# Patient Record
Sex: FEMALE | Race: WHITE | NOT HISPANIC OR LATINO | Employment: OTHER | URBAN - METROPOLITAN AREA
[De-identification: names, ages, dates, MRNs, and addresses within clinical notes are randomized per-mention and may not be internally consistent; named-entity substitution may affect disease eponyms.]

---

## 2023-08-21 ENCOUNTER — HOSPITAL ENCOUNTER (OUTPATIENT)
Dept: DATA CONVERSION | Facility: HOSPITAL | Age: 88
Discharge: HOME | End: 2023-08-21
Payer: MEDICARE

## 2023-08-21 DIAGNOSIS — Z00.00 ENCOUNTER FOR GENERAL ADULT MEDICAL EXAMINATION WITHOUT ABNORMAL FINDINGS: ICD-10-CM

## 2023-08-21 DIAGNOSIS — I10 ESSENTIAL (PRIMARY) HYPERTENSION: ICD-10-CM

## 2023-08-21 DIAGNOSIS — E78.5 HYPERLIPIDEMIA, UNSPECIFIED: ICD-10-CM

## 2023-08-21 LAB
25(OH)D3 SERPL-MCNC: 36 NG/ML (ref 31–100)
ALBUMIN SERPL-MCNC: 4.2 GM/DL (ref 3.5–5)
ALBUMIN/GLOB SERPL: 1.6 RATIO (ref 1.5–3)
ALP BLD-CCNC: 117 U/L (ref 35–125)
ALT SERPL-CCNC: 9 U/L (ref 5–40)
ANION GAP SERPL CALCULATED.3IONS-SCNC: 11 MMOL/L (ref 0–19)
APPEARANCE PLAS: ABNORMAL
AST SERPL-CCNC: 19 U/L (ref 5–40)
BILIRUB SERPL-MCNC: 0.3 MG/DL (ref 0.1–1.2)
BUN SERPL-MCNC: 12 MG/DL (ref 8–25)
BUN/CREAT SERPL: 20 RATIO (ref 8–21)
CALCIUM SERPL-MCNC: 9.2 MG/DL (ref 8.5–10.4)
CHLORIDE SERPL-SCNC: 98 MMOL/L (ref 97–107)
CHOLEST SERPL-MCNC: 137 MG/DL (ref 133–200)
CHOLEST/HDLC SERPL: 2.9 RATIO
CO2 SERPL-SCNC: 25 MMOL/L (ref 24–31)
COLOR SPUN FLD: ABNORMAL
CREAT SERPL-MCNC: 0.6 MG/DL (ref 0.4–1.6)
DEPRECATED RDW RBC AUTO: 47.8 FL (ref 37–54)
ERYTHROCYTE [DISTWIDTH] IN BLOOD BY AUTOMATED COUNT: 13.7 % (ref 11.7–15)
FASTING STATUS PATIENT QL REPORTED: ABNORMAL
GFR SERPL CREATININE-BSD FRML MDRD: 87 ML/MIN/1.73 M2
GLOBULIN SER-MCNC: 2.6 G/DL (ref 1.9–3.7)
GLUCOSE SERPL-MCNC: 102 MG/DL (ref 65–99)
HCT VFR BLD AUTO: 38.5 % (ref 36–44)
HDLC SERPL-MCNC: 47 MG/DL
HGB BLD-MCNC: 12.5 GM/DL (ref 12–15)
LDLC SERPL CALC-MCNC: 68 MG/DL (ref 65–130)
MCH RBC QN AUTO: 30.6 PG (ref 26–34)
MCHC RBC AUTO-ENTMCNC: 32.5 % (ref 31–37)
MCV RBC AUTO: 94.4 FL (ref 80–100)
NRBC BLD-RTO: 0 /100 WBC
PLATELET # BLD AUTO: 266 K/UL (ref 150–450)
PMV BLD AUTO: 10.4 CU (ref 7–12.6)
POTASSIUM SERPL-SCNC: 4.8 MMOL/L (ref 3.4–5.1)
PROT SERPL-MCNC: 6.8 G/DL (ref 5.9–7.9)
RBC # BLD AUTO: 4.08 M/UL (ref 4–4.9)
SODIUM SERPL-SCNC: 134 MMOL/L (ref 133–145)
TRIGL SERPL-MCNC: 111 MG/DL (ref 40–150)
VIT B12 SERPL-MCNC: 934 PG/ML (ref 211–946)
WBC # BLD AUTO: 6.2 K/UL (ref 4.5–11)

## 2023-09-16 VITALS
RESPIRATION RATE: 16 BRPM | SYSTOLIC BLOOD PRESSURE: 136 MMHG | HEIGHT: 66 IN | OXYGEN SATURATION: 98 % | BODY MASS INDEX: 18.96 KG/M2 | TEMPERATURE: 97.2 F | DIASTOLIC BLOOD PRESSURE: 72 MMHG | HEART RATE: 62 BPM | WEIGHT: 118 LBS

## 2023-09-17 PROBLEM — F10.10 ALCOHOL ABUSE: Status: ACTIVE | Noted: 2023-09-17

## 2023-09-17 PROBLEM — K44.9 HIATAL HERNIA: Status: ACTIVE | Noted: 2023-09-17

## 2023-09-17 PROBLEM — E78.5 HYPERLIPIDEMIA: Status: ACTIVE | Noted: 2023-09-17

## 2023-09-17 PROBLEM — I49.9 CARDIAC ARRHYTHMIA: Status: ACTIVE | Noted: 2023-09-17

## 2023-09-17 PROBLEM — M85.80 OSTEOPENIA: Status: ACTIVE | Noted: 2023-09-17

## 2023-09-17 PROBLEM — S39.92XA BACK INJURY: Status: ACTIVE | Noted: 2023-09-17

## 2023-09-17 PROBLEM — S42.253A CLOSED FRACTURE OF GREATER TUBEROSITY OF HUMERUS: Status: ACTIVE | Noted: 2023-09-17

## 2023-09-17 PROBLEM — S72.143A CLOSED INTERTROCHANTERIC FRACTURE (MULTI): Status: ACTIVE | Noted: 2023-09-17

## 2023-09-17 PROBLEM — K21.9 CHRONIC GERD: Status: ACTIVE | Noted: 2023-09-17

## 2023-09-17 PROBLEM — Z96.641 STATUS POST RIGHT HIP REPLACEMENT: Status: ACTIVE | Noted: 2023-09-17

## 2023-09-17 PROBLEM — C44.92 SCC (SQUAMOUS CELL CARCINOMA): Status: ACTIVE | Noted: 2023-09-17

## 2023-09-17 PROBLEM — I20.89 SYNCOPE ANGINOSA (CMS-HCC): Status: ACTIVE | Noted: 2023-09-17

## 2023-09-17 PROBLEM — J45.909 ASTHMATIC BRONCHITIS (HHS-HCC): Status: ACTIVE | Noted: 2023-09-17

## 2023-09-17 PROBLEM — I10 HTN (HYPERTENSION), BENIGN: Status: ACTIVE | Noted: 2023-09-17

## 2023-09-17 PROBLEM — F41.1 GENERALIZED ANXIETY DISORDER: Status: ACTIVE | Noted: 2023-09-17

## 2023-09-17 PROBLEM — R07.9 CHEST PAIN: Status: ACTIVE | Noted: 2023-09-17

## 2023-09-17 PROBLEM — I48.0 PAROXYSMAL ATRIAL FIBRILLATION (MULTI): Status: ACTIVE | Noted: 2023-09-17

## 2023-09-17 PROBLEM — R73.01 IMPAIRED FASTING GLUCOSE: Status: ACTIVE | Noted: 2023-09-17

## 2023-09-17 RX ORDER — ALPRAZOLAM 0.25 MG/1
1 TABLET ORAL DAILY PRN
COMMUNITY
End: 2023-10-04 | Stop reason: SDUPTHER

## 2023-09-17 RX ORDER — CETIRIZINE HYDROCHLORIDE 10 MG/1
1 TABLET ORAL DAILY
COMMUNITY
Start: 2022-06-07 | End: 2023-11-14 | Stop reason: WASHOUT

## 2023-09-17 RX ORDER — FERROUS SULFATE, DRIED 160(50) MG
1 TABLET, EXTENDED RELEASE ORAL DAILY
COMMUNITY

## 2023-09-17 RX ORDER — BISACODYL 5 MG
1 TABLET, DELAYED RELEASE (ENTERIC COATED) ORAL DAILY PRN
COMMUNITY
Start: 2022-08-04 | End: 2023-10-02 | Stop reason: ALTCHOICE

## 2023-09-17 RX ORDER — FOLIC ACID 1 MG/1
1 TABLET ORAL DAILY
COMMUNITY
Start: 2022-08-04 | End: 2023-10-02 | Stop reason: ALTCHOICE

## 2023-09-17 RX ORDER — METOPROLOL SUCCINATE 50 MG/1
1 TABLET, EXTENDED RELEASE ORAL DAILY
COMMUNITY
Start: 2022-11-10 | End: 2023-10-12

## 2023-09-17 RX ORDER — POTASSIUM CHLORIDE 1500 MG/1
1 TABLET, EXTENDED RELEASE ORAL 2 TIMES DAILY
COMMUNITY
End: 2023-10-02 | Stop reason: ALTCHOICE

## 2023-09-17 RX ORDER — TRIAMCINOLONE ACETONIDE 55 UG/1
2 SPRAY, METERED NASAL 2 TIMES DAILY
COMMUNITY
End: 2023-10-02 | Stop reason: ALTCHOICE

## 2023-09-17 RX ORDER — ALPRAZOLAM 0.5 MG/1
0.5 TABLET ORAL 2 TIMES DAILY PRN
COMMUNITY
Start: 2023-02-10 | End: 2023-10-02 | Stop reason: ALTCHOICE

## 2023-09-17 RX ORDER — SODIUM CHLORIDE 1000 MG
1 TABLET, SOLUBLE MISCELLANEOUS 3 TIMES DAILY
COMMUNITY
Start: 2022-08-04 | End: 2023-10-02 | Stop reason: ALTCHOICE

## 2023-09-17 RX ORDER — LIDOCAINE 50 MG/G
1 PATCH TOPICAL DAILY
COMMUNITY
Start: 2023-07-27

## 2023-09-17 RX ORDER — AMLODIPINE BESYLATE 10 MG/1
1 TABLET ORAL DAILY
COMMUNITY

## 2023-09-17 RX ORDER — ATORVASTATIN CALCIUM 10 MG/1
1 TABLET, FILM COATED ORAL DAILY
COMMUNITY

## 2023-09-17 RX ORDER — APIXABAN 2.5 MG/1
TABLET, FILM COATED ORAL 2 TIMES DAILY
COMMUNITY
Start: 2022-11-10 | End: 2023-10-02 | Stop reason: ALTCHOICE

## 2023-09-17 RX ORDER — CLONIDINE HYDROCHLORIDE 0.1 MG/1
1 TABLET ORAL EVERY 6 HOURS PRN
COMMUNITY
Start: 2022-08-04 | End: 2023-10-02 | Stop reason: ALTCHOICE

## 2023-09-17 RX ORDER — MIRTAZAPINE 15 MG/1
1 TABLET, FILM COATED ORAL NIGHTLY
COMMUNITY
End: 2024-06-05

## 2023-09-17 RX ORDER — LANOLIN ALCOHOL/MO/W.PET/CERES
1 CREAM (GRAM) TOPICAL DAILY
COMMUNITY
Start: 2022-08-04 | End: 2023-10-02 | Stop reason: ALTCHOICE

## 2023-09-17 RX ORDER — OMEPRAZOLE 20 MG/1
1 CAPSULE, DELAYED RELEASE ORAL
COMMUNITY

## 2023-09-17 RX ORDER — POTASSIUM CHLORIDE 20 MEQ/1
1 TABLET, EXTENDED RELEASE ORAL DAILY
COMMUNITY
End: 2023-10-02 | Stop reason: ALTCHOICE

## 2023-09-17 RX ORDER — TALC
1 POWDER (GRAM) TOPICAL NIGHTLY
COMMUNITY
Start: 2022-08-04 | End: 2023-10-02 | Stop reason: ALTCHOICE

## 2023-09-17 RX ORDER — ACETAMINOPHEN 325 MG/1
2 TABLET ORAL EVERY 4 HOURS PRN
COMMUNITY
Start: 2022-08-04 | End: 2023-10-02 | Stop reason: ALTCHOICE

## 2023-09-17 RX ORDER — PANTOPRAZOLE SODIUM 40 MG/1
1 TABLET, DELAYED RELEASE ORAL DAILY
COMMUNITY
Start: 2022-08-04 | End: 2023-10-02 | Stop reason: ALTCHOICE

## 2023-09-17 RX ORDER — LANOLIN ALCOHOL/MO/W.PET/CERES
1 CREAM (GRAM) TOPICAL DAILY
COMMUNITY
End: 2023-10-02 | Stop reason: ALTCHOICE

## 2023-10-01 PROBLEM — F41.1 ANXIETY STATE: Status: ACTIVE | Noted: 2023-10-01

## 2023-10-01 PROBLEM — G89.29 OTHER CHRONIC PAIN: Status: ACTIVE | Noted: 2023-10-01

## 2023-10-01 RX ORDER — DEXTROMETHORPHAN HYDROBROMIDE, GUAIFENESIN 5; 100 MG/5ML; MG/5ML
1300 LIQUID ORAL EVERY 8 HOURS PRN
COMMUNITY

## 2023-10-02 ENCOUNTER — NURSING HOME VISIT (OUTPATIENT)
Dept: POST ACUTE CARE | Facility: EXTERNAL LOCATION | Age: 88
End: 2023-10-02
Payer: MEDICARE

## 2023-10-02 VITALS
RESPIRATION RATE: 16 BRPM | BODY MASS INDEX: 18.88 KG/M2 | DIASTOLIC BLOOD PRESSURE: 72 MMHG | WEIGHT: 117 LBS | TEMPERATURE: 97.7 F | SYSTOLIC BLOOD PRESSURE: 122 MMHG | HEART RATE: 75 BPM

## 2023-10-02 DIAGNOSIS — F41.1 GENERALIZED ANXIETY DISORDER: ICD-10-CM

## 2023-10-02 DIAGNOSIS — E78.2 MIXED HYPERLIPIDEMIA: Chronic | ICD-10-CM

## 2023-10-02 DIAGNOSIS — K21.9 CHRONIC GERD: ICD-10-CM

## 2023-10-02 DIAGNOSIS — R60.0 BILATERAL LEG EDEMA: ICD-10-CM

## 2023-10-02 DIAGNOSIS — I48.0 PAROXYSMAL ATRIAL FIBRILLATION (MULTI): ICD-10-CM

## 2023-10-02 DIAGNOSIS — I10 HTN (HYPERTENSION), BENIGN: Primary | ICD-10-CM

## 2023-10-02 DIAGNOSIS — Z00.00 HEALTHCARE MAINTENANCE: ICD-10-CM

## 2023-10-02 PROCEDURE — 99349 HOME/RES VST EST MOD MDM 40: CPT | Performed by: NURSE PRACTITIONER

## 2023-10-02 ASSESSMENT — PAIN SCALES - GENERAL: PAINLEVEL: 0-NO PAIN

## 2023-10-02 ASSESSMENT — ENCOUNTER SYMPTOMS
ABDOMINAL PAIN: 0
JOINT SWELLING: 0
COUGH: 0
CHILLS: 0
WHEEZING: 0
SHORTNESS OF BREATH: 0
APPETITE CHANGE: 0
FEVER: 0
HEADACHES: 0
NUMBNESS: 0
NAUSEA: 0
DIARRHEA: 0
DIZZINESS: 0
NERVOUS/ANXIOUS: 1
VOMITING: 0
PALPITATIONS: 0
CONSTIPATION: 1

## 2023-10-02 NOTE — PROGRESS NOTES
Subjective   Patient ID: Stephanie Jimenez is a 87 y.o. female who is assisted living/ home patient being seen and evaluated for multiple medical problems.    Patient is seen this morning at [facility] sitting in chair in her room. Upon provider arrival, she was initially up ambulating with walker into the bathroom. She is alert, oriented, able to answer all questions regarding her health. Facility staff does supplement HPI as needed. Patient does not drive or have a vehicle. She requires supervision by facility staff but is able to perform ADL's with minimal assistance. She denies appetite changes, signs of weight loss. Denies abdominal pain, N/V. Does admit to intermittent constipation but denies any current bowel concerns. Denies bladder concerns. She is ambulatory with walker. Denies recent fall or injury. She has h/o anxiety, managed with routine Mirtazepine and Xanax as needed. She does not see psychiatry or have a counselor and reports that she is not interested in establishing care. She mostly takes the Xanax in the evenings and feels that it is beneficial. She denies increased s/sx of depression. Denies SI or HI. 8/21 routine labs reviewed. CBC, CMP, Lipid, Vit B12, Vit D level stable.          Review of Systems   Constitutional:  Negative for appetite change, chills and fever.   Respiratory:  Negative for cough, shortness of breath and wheezing.    Cardiovascular:  Positive for leg swelling. Negative for chest pain and palpitations.   Gastrointestinal:  Positive for constipation. Negative for abdominal pain, diarrhea, nausea and vomiting.   Musculoskeletal:  Positive for gait problem. Negative for joint swelling.   Neurological:  Negative for dizziness, numbness and headaches.   Psychiatric/Behavioral:  The patient is nervous/anxious.        Objective   /72   Pulse 75   Temp 36.5 °C (97.7 °F) (Temporal)   Resp 16   Wt 53.1 kg (117 lb)   BMI 18.88 kg/m²     Physical Exam  Constitutional:        General: She is awake.      Appearance: Normal appearance. She is well-developed.      Comments: Sitting in chair with legs dependent   HENT:      Nose: Nose normal.      Mouth/Throat:      Mouth: Mucous membranes are moist.   Eyes:      Extraocular Movements: Extraocular movements intact.      Pupils: Pupils are equal, round, and reactive to light.   Cardiovascular:      Rate and Rhythm: Normal rate and regular rhythm.   Pulmonary:      Effort: Pulmonary effort is normal.      Breath sounds: Normal breath sounds.   Chest:      Chest wall: No tenderness.   Abdominal:      General: Abdomen is flat.      Palpations: Abdomen is soft. There is no mass.      Tenderness: There is no abdominal tenderness. There is no guarding.   Musculoskeletal:      Cervical back: Neck supple.      Right lower le+ Edema present.      Left lower le+ Edema present.   Neurological:      Mental Status: She is alert and oriented to person, place, and time.      Cranial Nerves: Cranial nerves 2-12 are intact.   Psychiatric:         Mood and Affect: Mood is anxious.         Behavior: Behavior is cooperative.       Stephanie was seen today for follow-up.  Diagnoses and all orders for this visit:  HTN (hypertension), benign (Primary)  Comments:  chronic, vitals stable  Mixed hyperlipidemia  Comments:  chronic,  lipid panel stable  Paroxysmal atrial fibrillation (CMS/HCC)  Comments:  chronic, HR stable  Chronic GERD  Comments:  chronic, stable, no current GI concerns  Bilateral leg edema  Comments:  chronic, minimal edema noted. Recommend continued use of compression stockings daily  Generalized anxiety disorder  Comments:  chronic, stable on current medication regimen. Declines to see psychiatry  Healthcare maintenance     YUNIOR Booker-CNP

## 2023-10-04 ENCOUNTER — TELEPHONE (OUTPATIENT)
Dept: PRIMARY CARE | Facility: CLINIC | Age: 88
End: 2023-10-04
Payer: MEDICARE

## 2023-10-04 DIAGNOSIS — F41.1 ANXIETY STATE: Primary | ICD-10-CM

## 2023-10-04 RX ORDER — ALPRAZOLAM 0.25 MG/1
0.25 TABLET ORAL DAILY PRN
Qty: 30 TABLET | Refills: 0 | Status: SHIPPED | OUTPATIENT
Start: 2023-10-04 | End: 2023-12-19 | Stop reason: SDUPTHER

## 2023-10-04 NOTE — TELEPHONE ENCOUNTER
Pt said in the past you have prescribed her medication to help calm her nerves? She said she is already taking something for her anxiety

## 2023-10-04 NOTE — TELEPHONE ENCOUNTER
Disregard message about diarrhea that is for another patient.      See if she is requesting a refill on alprazolam or wants to try something to take everyday like zoloft to help with anxiety.

## 2023-10-12 DIAGNOSIS — I10 PRIMARY HYPERTENSION: Primary | ICD-10-CM

## 2023-10-12 RX ORDER — METOPROLOL SUCCINATE 50 MG/1
50 TABLET, EXTENDED RELEASE ORAL DAILY
Qty: 30 TABLET | Refills: 5 | Status: SHIPPED | OUTPATIENT
Start: 2023-10-12 | End: 2024-03-14

## 2023-11-01 ENCOUNTER — TELEPHONE (OUTPATIENT)
Dept: PRIMARY CARE | Facility: CLINIC | Age: 88
End: 2023-11-01
Payer: MEDICARE

## 2023-11-01 DIAGNOSIS — M19.90 ARTHRITIS: Primary | ICD-10-CM

## 2023-11-01 RX ORDER — TRAMADOL HYDROCHLORIDE 50 MG/1
50 TABLET ORAL EVERY 8 HOURS PRN
Qty: 21 TABLET | Refills: 0 | Status: SHIPPED | OUTPATIENT
Start: 2023-11-01 | End: 2023-12-10

## 2023-11-01 NOTE — TELEPHONE ENCOUNTER
Patients daughter said you had called in an RX for Tramadol back in January after she broke her arm (she couldn't have surgery) - she just took the last pill and only takes it PRN, she said in the winter time the pain seems to be a lot worse.

## 2023-11-14 ENCOUNTER — OFFICE VISIT (OUTPATIENT)
Dept: PRIMARY CARE | Facility: CLINIC | Age: 88
End: 2023-11-14
Payer: MEDICARE

## 2023-11-14 VITALS
HEIGHT: 66 IN | OXYGEN SATURATION: 99 % | SYSTOLIC BLOOD PRESSURE: 124 MMHG | DIASTOLIC BLOOD PRESSURE: 60 MMHG | WEIGHT: 123 LBS | HEART RATE: 64 BPM | BODY MASS INDEX: 19.77 KG/M2

## 2023-11-14 DIAGNOSIS — M70.62 TROCHANTERIC BURSITIS OF BOTH HIPS: ICD-10-CM

## 2023-11-14 DIAGNOSIS — M54.50 CHRONIC BILATERAL LOW BACK PAIN WITHOUT SCIATICA: Primary | ICD-10-CM

## 2023-11-14 DIAGNOSIS — G89.29 CHRONIC BILATERAL LOW BACK PAIN WITHOUT SCIATICA: Primary | ICD-10-CM

## 2023-11-14 DIAGNOSIS — M70.61 TROCHANTERIC BURSITIS OF BOTH HIPS: ICD-10-CM

## 2023-11-14 PROCEDURE — 99213 OFFICE O/P EST LOW 20 MIN: CPT | Performed by: FAMILY MEDICINE

## 2023-11-14 PROCEDURE — 3078F DIAST BP <80 MM HG: CPT | Performed by: FAMILY MEDICINE

## 2023-11-14 PROCEDURE — 3074F SYST BP LT 130 MM HG: CPT | Performed by: FAMILY MEDICINE

## 2023-11-14 PROCEDURE — 1036F TOBACCO NON-USER: CPT | Performed by: FAMILY MEDICINE

## 2023-11-14 PROCEDURE — 1125F AMNT PAIN NOTED PAIN PRSNT: CPT | Performed by: FAMILY MEDICINE

## 2023-11-14 PROCEDURE — 1159F MED LIST DOCD IN RCRD: CPT | Performed by: FAMILY MEDICINE

## 2023-11-14 RX ORDER — TIZANIDINE HYDROCHLORIDE 4 MG/1
4 CAPSULE, GELATIN COATED ORAL 3 TIMES DAILY PRN
Qty: 30 CAPSULE | Refills: 1 | Status: SHIPPED | OUTPATIENT
Start: 2023-11-14 | End: 2024-01-23 | Stop reason: SDUPTHER

## 2023-11-14 ASSESSMENT — ENCOUNTER SYMPTOMS
MYALGIAS: 1
DYSURIA: 0
BACK PAIN: 1
NUMBNESS: 0

## 2023-11-14 ASSESSMENT — PATIENT HEALTH QUESTIONNAIRE - PHQ9
SUM OF ALL RESPONSES TO PHQ9 QUESTIONS 1 AND 2: 0
2. FEELING DOWN, DEPRESSED OR HOPELESS: NOT AT ALL
1. LITTLE INTEREST OR PLEASURE IN DOING THINGS: NOT AT ALL

## 2023-11-14 ASSESSMENT — PAIN SCALES - GENERAL: PAINLEVEL: 10-WORST PAIN EVER

## 2023-11-14 NOTE — PROGRESS NOTES
[facility]: [facility]  E/M EVALUATION    Stephanie Jimenez is a 87 y.o. female who presents for Pain (Patient said since last week she has been having worsening sciatica pain on the right side/dmw).    Subjective   Pt here for low back pain, usually left lumbar but recently now on right side.  Some radiation into lateral right hip.  Traking lidocaine patch, apap 6 per day, tramadol prn.  No falls.  No issues with bowels or bladder.      Review of Systems   Genitourinary:  Negative for dysuria.   Musculoskeletal:  Positive for back pain, gait problem and myalgias.   Neurological:  Negative for numbness.       Objective   Vitals:    11/14/23 1330   BP: 124/60   Pulse: 64   SpO2: 99%     Physical Exam  Musculoskeletal:      Lumbar back: Deformity and tenderness present. No bony tenderness. Decreased range of motion. Negative right straight leg raise test and negative left straight leg raise test. Scoliosis present.      Right upper leg: Tenderness present.      Left upper leg: Tenderness present.      Comments: Bilateral paraspinal tenderness.  Tender greater trochanteric bursa. Good ROM of hips wo pain.            Assessment/Plan      Patient Active Problem List   Diagnosis    Back injury    Alcohol abuse    Asthmatic bronchitis    Cardiac arrhythmia    Chronic GERD    Hiatal hernia    Closed fracture of greater tuberosity of humerus    Closed intertrochanteric fracture (CMS/HCC)    HTN (hypertension), benign    Hyperlipidemia    Impaired fasting glucose    Osteopenia    Chest pain    SCC (squamous cell carcinoma)    Status post right hip replacement    Syncope anginosa    Generalized anxiety disorder    Paroxysmal atrial fibrillation (CMS/HCC)    Anxiety state    Other chronic pain    Bilateral leg edema    Healthcare maintenance       Diagnoses and all orders for this visit:  Chronic bilateral low back pain without sciatica  -     tiZANidine (Zanaflex) 4 mg capsule; Take 1 capsule (4 mg) by  mouth 3 times a day as needed for muscle spasms (pain) for up to 20 days.  Trochanteric bursitis of both hips  Lidocaine patch, tylenol.  Consider PT     The patient was encouraged to ensure that any/all documentation is accurate and up to date, and that our office be provided a copy in the event that anything changes.         Henri Ordaz MD

## 2023-11-29 ENCOUNTER — APPOINTMENT (OUTPATIENT)
Dept: PRIMARY CARE | Facility: CLINIC | Age: 88
End: 2023-11-29
Payer: MEDICARE

## 2023-12-05 DIAGNOSIS — M19.90 ARTHRITIS: ICD-10-CM

## 2023-12-10 RX ORDER — TRAMADOL HYDROCHLORIDE 50 MG/1
50 TABLET ORAL EVERY 8 HOURS PRN
Qty: 21 TABLET | Refills: 0 | Status: SHIPPED | OUTPATIENT
Start: 2023-12-10 | End: 2023-12-17

## 2023-12-19 DIAGNOSIS — F41.1 ANXIETY STATE: ICD-10-CM

## 2023-12-19 RX ORDER — ALPRAZOLAM 0.25 MG/1
0.25 TABLET ORAL DAILY PRN
Qty: 30 TABLET | Refills: 0 | Status: SHIPPED | OUTPATIENT
Start: 2023-12-19 | End: 2024-01-23 | Stop reason: SDUPTHER

## 2024-01-08 ENCOUNTER — NURSING HOME VISIT (OUTPATIENT)
Dept: POST ACUTE CARE | Facility: EXTERNAL LOCATION | Age: 89
End: 2024-01-08
Payer: MEDICARE

## 2024-01-08 VITALS
OXYGEN SATURATION: 97 % | BODY MASS INDEX: 19.99 KG/M2 | SYSTOLIC BLOOD PRESSURE: 148 MMHG | DIASTOLIC BLOOD PRESSURE: 70 MMHG | TEMPERATURE: 97.5 F | HEART RATE: 76 BPM | WEIGHT: 122 LBS

## 2024-01-08 DIAGNOSIS — I10 HTN (HYPERTENSION), BENIGN: ICD-10-CM

## 2024-01-08 DIAGNOSIS — F41.1 GENERALIZED ANXIETY DISORDER: ICD-10-CM

## 2024-01-08 DIAGNOSIS — S39.92XD INJURY OF BACK, SUBSEQUENT ENCOUNTER: Primary | ICD-10-CM

## 2024-01-08 DIAGNOSIS — I48.0 PAROXYSMAL ATRIAL FIBRILLATION (MULTI): ICD-10-CM

## 2024-01-08 DIAGNOSIS — C44.92 SCC (SQUAMOUS CELL CARCINOMA): ICD-10-CM

## 2024-01-08 PROCEDURE — 99349 HOME/RES VST EST MOD MDM 40: CPT | Performed by: NURSE PRACTITIONER

## 2024-01-08 ASSESSMENT — ENCOUNTER SYMPTOMS
FATIGUE: 0
APPETITE CHANGE: 0
CONSTIPATION: 0
COUGH: 0
BRUISES/BLEEDS EASILY: 0
WOUND: 0
NAUSEA: 0
WEAKNESS: 0
JOINT SWELLING: 0
ABDOMINAL PAIN: 0
FEVER: 0
WHEEZING: 0
VOMITING: 0
NUMBNESS: 0
SHORTNESS OF BREATH: 0
SEIZURES: 0
MYALGIAS: 0
HEADACHES: 0
EYE PAIN: 0
PALPITATIONS: 0
DIZZINESS: 0
ABDOMINAL DISTENTION: 0
COLOR CHANGE: 0
TROUBLE SWALLOWING: 0
DIFFICULTY URINATING: 0
CHILLS: 0
ADENOPATHY: 0
DIARRHEA: 0
NERVOUS/ANXIOUS: 1

## 2024-01-08 ASSESSMENT — PAIN SCALES - GENERAL: PAINLEVEL: 3

## 2024-01-08 NOTE — ASSESSMENT & PLAN NOTE
Patient to maintain routine follow-up with dermatology for continued treatments and recommendations

## 2024-01-08 NOTE — PROGRESS NOTES
Subjective   Patient ID: Stephanie Jimenez is a 88 y.o. female who is assisted living/ home patient being seen and evaluated for multiple medical problems.    HPI   Patient is seen this morning at [facility] sitting in chair in her room. She states that she continues to ambulate with a walker and denies any recent falls. She also denies any associated SOB or CP upon exertion. She is alert, oriented, able to answer all questions regarding her health. Facility staff does supplement HPI as needed. Patient does not drive or have a vehicle. She requires supervision by facility staff but is able to perform ADL's with minimal assistance. She denies appetite changes, signs of weight loss. Denies abdominal pain, N/V/D/C, or bladder issues. Chronic lower back pain is tolerated with PRN Tylenol and comfort measures. She states that her anxiety is currently stable with medication regiment. Medications and chart reviewed. Discussed with facility staff.      Review of Systems   Constitutional:  Negative for appetite change, chills, fatigue and fever.   HENT:  Negative for dental problem and trouble swallowing.    Eyes:  Negative for pain and visual disturbance.   Respiratory:  Negative for cough, shortness of breath and wheezing.    Cardiovascular:  Positive for leg swelling. Negative for chest pain and palpitations.   Gastrointestinal:  Negative for abdominal distention, abdominal pain, constipation, diarrhea, nausea and vomiting.   Endocrine: Negative for cold intolerance and heat intolerance.   Genitourinary:  Negative for difficulty urinating.   Musculoskeletal:  Positive for gait problem. Negative for joint swelling and myalgias.   Skin:  Negative for color change, pallor, rash and wound.        Positive for skin cancer   Allergic/Immunologic: Negative for environmental allergies and food allergies.   Neurological:  Negative for dizziness, seizures, weakness, numbness and headaches.   Hematological:  Negative for  adenopathy. Does not bruise/bleed easily.   Psychiatric/Behavioral:  Negative for behavioral problems. The patient is nervous/anxious.    All other systems reviewed and are negative.      Objective   /70   Pulse 76   Temp 36.4 °C (97.5 °F)   Wt 55.3 kg (122 lb) Comment: Stated  SpO2 97%   BMI 19.99 kg/m²     Physical Exam  Constitutional:       General: She is awake.      Appearance: Normal appearance. She is well-developed.      Comments: Sitting in chair with legs dependent   HENT:      Nose: Nose normal.      Mouth/Throat:      Mouth: Mucous membranes are moist.   Eyes:      Extraocular Movements: Extraocular movements intact.      Pupils: Pupils are equal, round, and reactive to light.   Cardiovascular:      Rate and Rhythm: Normal rate and regular rhythm.   Pulmonary:      Effort: Pulmonary effort is normal.      Breath sounds: Normal breath sounds.   Chest:      Chest wall: No tenderness.   Abdominal:      General: Abdomen is flat.      Palpations: Abdomen is soft. There is no mass.      Tenderness: There is no abdominal tenderness. There is no guarding.   Musculoskeletal:      Cervical back: Neck supple.      Right lower leg: Edema present.      Left lower leg: Edema present.      Comments: Non-pitting BLE Edema   Neurological:      Mental Status: She is alert and oriented to person, place, and time.      Cranial Nerves: Cranial nerves 2-12 are intact.   Psychiatric:         Mood and Affect: Mood is anxious.         Behavior: Behavior is cooperative.       Problem List Items Addressed This Visit             ICD-10-CM       Cardiac and Vasculature     Patient to continue current medication regiment. Staff to monitor weights and VS routinely. Provider to be notified of any new cardiac concerns         HTN (hypertension), benign I10    Paroxysmal atrial fibrillation (CMS/HCC) I48.0       Hematology and Neoplasia     Patient to maintain routine follow-up with dermatology for continued treatments and  recommendations         SCC (squamous cell carcinoma) C44.92       Mental Health     Reportedly stable. Patient to continue current medications regiment and she may follow-up with [facility] as needed for any new mood concerns         Generalized anxiety disorder F41.1       Musculoskeletal and Injuries     Continue with comfort measures and supportive care. OT/PT as appropriate         Back injury - Primary S39.92XA       Kelly Jamison, APRN-CNP

## 2024-01-08 NOTE — ASSESSMENT & PLAN NOTE
Patient to continue current medication regiment. Staff to monitor weights and VS routinely. Provider to be notified of any new cardiac concerns

## 2024-01-08 NOTE — ASSESSMENT & PLAN NOTE
Reportedly stable. Patient to continue current medications regiment and she may follow-up with [facility] as needed for any new mood concerns

## 2024-01-08 NOTE — LETTER
Patient: Stephanie Jimenez  : 1935    Encounter Date: 2024    Subjective  Patient ID: Stephanie Jimenez is a 88 y.o. female who is assisted living/ home patient being seen and evaluated for multiple medical problems.    HPI   Patient is seen this morning at [facility] sitting in chair in her room. She states that she continues to ambulate with a walker and denies any recent falls. She also denies any associated SOB or CP upon exertion. She is alert, oriented, able to answer all questions regarding her health. Facility staff does supplement HPI as needed. Patient does not drive or have a vehicle. She requires supervision by facility staff but is able to perform ADL's with minimal assistance. She denies appetite changes, signs of weight loss. Denies abdominal pain, N/V/D/C, or bladder issues. Chronic lower back pain is tolerated with PRN Tylenol and comfort measures. She states that her anxiety is currently stable with medication regiment. Medications and chart reviewed. Discussed with facility staff.      Review of Systems   Constitutional:  Negative for appetite change, chills, fatigue and fever.   HENT:  Negative for dental problem and trouble swallowing.    Eyes:  Negative for pain and visual disturbance.   Respiratory:  Negative for cough, shortness of breath and wheezing.    Cardiovascular:  Positive for leg swelling. Negative for chest pain and palpitations.   Gastrointestinal:  Negative for abdominal distention, abdominal pain, constipation, diarrhea, nausea and vomiting.   Endocrine: Negative for cold intolerance and heat intolerance.   Genitourinary:  Negative for difficulty urinating.   Musculoskeletal:  Positive for gait problem. Negative for joint swelling and myalgias.   Skin:  Negative for color change, pallor, rash and wound.        Positive for skin cancer   Allergic/Immunologic: Negative for environmental allergies and food allergies.   Neurological:  Negative for dizziness,  seizures, weakness, numbness and headaches.   Hematological:  Negative for adenopathy. Does not bruise/bleed easily.   Psychiatric/Behavioral:  Negative for behavioral problems. The patient is nervous/anxious.    All other systems reviewed and are negative.      Objective  /70   Pulse 76   Temp 36.4 °C (97.5 °F)   Wt 55.3 kg (122 lb) Comment: Stated  SpO2 97%   BMI 19.99 kg/m²     Physical Exam  Constitutional:       General: She is awake.      Appearance: Normal appearance. She is well-developed.      Comments: Sitting in chair with legs dependent   HENT:      Nose: Nose normal.      Mouth/Throat:      Mouth: Mucous membranes are moist.   Eyes:      Extraocular Movements: Extraocular movements intact.      Pupils: Pupils are equal, round, and reactive to light.   Cardiovascular:      Rate and Rhythm: Normal rate and regular rhythm.   Pulmonary:      Effort: Pulmonary effort is normal.      Breath sounds: Normal breath sounds.   Chest:      Chest wall: No tenderness.   Abdominal:      General: Abdomen is flat.      Palpations: Abdomen is soft. There is no mass.      Tenderness: There is no abdominal tenderness. There is no guarding.   Musculoskeletal:      Cervical back: Neck supple.      Right lower leg: Edema present.      Left lower leg: Edema present.      Comments: Non-pitting BLE Edema   Neurological:      Mental Status: She is alert and oriented to person, place, and time.      Cranial Nerves: Cranial nerves 2-12 are intact.   Psychiatric:         Mood and Affect: Mood is anxious.         Behavior: Behavior is cooperative.       Problem List Items Addressed This Visit             ICD-10-CM       Cardiac and Vasculature     Patient to continue current medication regiment. Staff to monitor weights and VS routinely. Provider to be notified of any new cardiac concerns         HTN (hypertension), benign I10    Paroxysmal atrial fibrillation (CMS/HCC) I48.0       Hematology and Neoplasia     Patient to  maintain routine follow-up with dermatology for continued treatments and recommendations         SCC (squamous cell carcinoma) C44.92       Mental Health     Reportedly stable. Patient to continue current medications regiment and she may follow-up with [facility] as needed for any new mood concerns         Generalized anxiety disorder F41.1       Musculoskeletal and Injuries     Continue with comfort measures and supportive care. OT/PT as appropriate         Back injury - Primary S39.92XA       DANIAL Lopez      Electronically Signed By: DANAIL Lopez   1/8/24  3:21 PM

## 2024-01-23 DIAGNOSIS — F41.1 ANXIETY STATE: ICD-10-CM

## 2024-01-23 DIAGNOSIS — G89.29 CHRONIC BILATERAL LOW BACK PAIN WITHOUT SCIATICA: ICD-10-CM

## 2024-01-23 DIAGNOSIS — M54.50 CHRONIC BILATERAL LOW BACK PAIN WITHOUT SCIATICA: ICD-10-CM

## 2024-01-24 RX ORDER — TIZANIDINE HYDROCHLORIDE 4 MG/1
4 CAPSULE, GELATIN COATED ORAL 3 TIMES DAILY PRN
Qty: 30 CAPSULE | Refills: 1 | Status: SHIPPED | OUTPATIENT
Start: 2024-01-24 | End: 2024-02-13

## 2024-01-24 RX ORDER — ALPRAZOLAM 0.25 MG/1
0.25 TABLET ORAL DAILY PRN
Qty: 30 TABLET | Refills: 1 | Status: SHIPPED | OUTPATIENT
Start: 2024-01-24 | End: 2024-04-01 | Stop reason: SDUPTHER

## 2024-03-14 DIAGNOSIS — I10 PRIMARY HYPERTENSION: ICD-10-CM

## 2024-03-14 RX ORDER — METOPROLOL SUCCINATE 50 MG/1
50 TABLET, EXTENDED RELEASE ORAL DAILY
Qty: 30 TABLET | Refills: 5 | Status: SHIPPED | OUTPATIENT
Start: 2024-03-14

## 2024-03-29 ENCOUNTER — TELEPHONE (OUTPATIENT)
Dept: PRIMARY CARE | Facility: CLINIC | Age: 89
End: 2024-03-29
Payer: MEDICARE

## 2024-03-29 NOTE — TELEPHONE ENCOUNTER
Phoned [facility] and spoke with Margie, 4/1/24 [facility] visit with Alisha Torrez NP confirmed.

## 2024-04-01 ENCOUNTER — NURSING HOME VISIT (OUTPATIENT)
Dept: POST ACUTE CARE | Facility: EXTERNAL LOCATION | Age: 89
End: 2024-04-01
Payer: MEDICARE

## 2024-04-01 VITALS
RESPIRATION RATE: 18 BRPM | OXYGEN SATURATION: 99 % | TEMPERATURE: 97.3 F | BODY MASS INDEX: 20.89 KG/M2 | DIASTOLIC BLOOD PRESSURE: 60 MMHG | SYSTOLIC BLOOD PRESSURE: 118 MMHG | WEIGHT: 130 LBS | HEART RATE: 78 BPM | HEIGHT: 66 IN

## 2024-04-01 DIAGNOSIS — K21.9 CHRONIC GERD: ICD-10-CM

## 2024-04-01 DIAGNOSIS — F41.1 ANXIETY STATE: ICD-10-CM

## 2024-04-01 DIAGNOSIS — Z00.00 HEALTHCARE MAINTENANCE: ICD-10-CM

## 2024-04-01 DIAGNOSIS — E78.2 MIXED HYPERLIPIDEMIA: ICD-10-CM

## 2024-04-01 DIAGNOSIS — F41.1 GENERALIZED ANXIETY DISORDER: ICD-10-CM

## 2024-04-01 DIAGNOSIS — R60.0 BILATERAL LEG EDEMA: ICD-10-CM

## 2024-04-01 DIAGNOSIS — G89.29 OTHER CHRONIC PAIN: ICD-10-CM

## 2024-04-01 DIAGNOSIS — I10 HTN (HYPERTENSION), BENIGN: Primary | ICD-10-CM

## 2024-04-01 DIAGNOSIS — I48.0 PAROXYSMAL ATRIAL FIBRILLATION (MULTI): ICD-10-CM

## 2024-04-01 LAB
ALBUMIN SERPL-MCNC: 4.5 G/DL (ref 3.5–5)
ALP BLD-CCNC: 121 U/L (ref 35–125)
ALT SERPL-CCNC: 16 U/L (ref 5–40)
ANION GAP SERPL CALC-SCNC: 14 MMOL/L
AST SERPL-CCNC: 30 U/L (ref 5–40)
BILIRUB SERPL-MCNC: 0.4 MG/DL (ref 0.1–1.2)
BUN SERPL-MCNC: 13 MG/DL (ref 8–25)
CALCIUM SERPL-MCNC: 9.4 MG/DL (ref 8.5–10.4)
CHLORIDE SERPL-SCNC: 97 MMOL/L (ref 97–107)
CHOLEST SERPL-MCNC: 140 MG/DL (ref 133–200)
CHOLEST/HDLC SERPL: 2.3 {RATIO}
CO2 SERPL-SCNC: 24 MMOL/L (ref 24–31)
CREAT SERPL-MCNC: 0.6 MG/DL (ref 0.4–1.6)
EGFRCR SERPLBLD CKD-EPI 2021: 87 ML/MIN/1.73M*2
ERYTHROCYTE [DISTWIDTH] IN BLOOD BY AUTOMATED COUNT: 13.4 % (ref 11.5–14.5)
EST. AVERAGE GLUCOSE BLD GHB EST-MCNC: 114 MG/DL
GLUCOSE SERPL-MCNC: 107 MG/DL (ref 65–99)
HBA1C MFR BLD: 5.6 %
HCT VFR BLD AUTO: 39.1 % (ref 36–46)
HDLC SERPL-MCNC: 60 MG/DL
HGB BLD-MCNC: 13 G/DL (ref 12–16)
LDLC SERPL CALC-MCNC: 52 MG/DL (ref 65–130)
MCH RBC QN AUTO: 31.5 PG (ref 26–34)
MCHC RBC AUTO-ENTMCNC: 33.2 G/DL (ref 32–36)
MCV RBC AUTO: 95 FL (ref 80–100)
NRBC BLD-RTO: 0 /100 WBCS (ref 0–0)
PLATELET # BLD AUTO: 285 X10*3/UL (ref 150–450)
POTASSIUM SERPL-SCNC: 5.3 MMOL/L (ref 3.4–5.1)
PROT SERPL-MCNC: 6.9 G/DL (ref 5.9–7.9)
RBC # BLD AUTO: 4.13 X10*6/UL (ref 4–5.2)
SODIUM SERPL-SCNC: 135 MMOL/L (ref 133–145)
TRIGL SERPL-MCNC: 141 MG/DL (ref 40–150)
WBC # BLD AUTO: 9.6 X10*3/UL (ref 4.4–11.3)

## 2024-04-01 PROCEDURE — 83036 HEMOGLOBIN GLYCOSYLATED A1C: CPT

## 2024-04-01 PROCEDURE — 80061 LIPID PANEL: CPT

## 2024-04-01 PROCEDURE — 80053 COMPREHEN METABOLIC PANEL: CPT

## 2024-04-01 PROCEDURE — 85027 COMPLETE CBC AUTOMATED: CPT

## 2024-04-01 PROCEDURE — 36415 COLL VENOUS BLD VENIPUNCTURE: CPT | Performed by: NURSE PRACTITIONER

## 2024-04-01 PROCEDURE — 99349 HOME/RES VST EST MOD MDM 40: CPT | Performed by: NURSE PRACTITIONER

## 2024-04-01 PROCEDURE — 36415 COLL VENOUS BLD VENIPUNCTURE: CPT

## 2024-04-01 RX ORDER — ALPRAZOLAM 0.25 MG/1
0.25 TABLET ORAL DAILY PRN
Qty: 30 TABLET | Refills: 1 | Status: SHIPPED | OUTPATIENT
Start: 2024-04-01 | End: 2024-05-20

## 2024-04-01 ASSESSMENT — PAIN SCALES - GENERAL: PAINLEVEL: 0-NO PAIN

## 2024-04-01 NOTE — LETTER
Patient: Stephanie Jimenez  : 1935    Encounter Date: 2024    Subjective  Patient ID: Stephanie Jimenez is a 88 y.o. female who presents for Follow-up (Routine follow up, chronic medical conditions, labs).    Visit for 89 y/o female seen today at [facility]. She is being seen for routine follow up with labs. She is sitting in her room watching TV. She is alert, oriented, able to answer all questions regarding her health. Patient does not drive or have a vehicle. She requires supervision by facility staff and is able to perform ADLs with minimal assistance. Patient denies appetite changes or signs of weight loss. She actually reports that she has gained weight, about 10 lbs because she is eating larger portions. She denies abdominal pain, nausea, vomiting. Denies bowel or bladder concerns. She denies difficulty sleeping. Continues to endorse anxiety. She takes Mirtazepine and Xanax with improvement. She does not see psych and does not want to establish care at this time. Pt is ambulatory with a walker. She denies recent falls or injuries. She endorses chronic low back pain. She takes Tylenol routinely and uses Salonpas patches with improvement. Pt has HTN. She does not routinely monitor her BP. She denies headaches, dizziness, chest pain, palpitations. She denies any acute concerns. Has not had any recent hospitalizations.          Current Outpatient Medications:   •  acetaminophen (Tylenol 8 HOUR) 650 mg ER tablet, Take 2 tablets (1,300 mg) by mouth every 8 hours., Disp: , Rfl:   •  ALPRAZolam (Xanax) 0.25 mg tablet, Take 1 tablet (0.25 mg) by mouth once daily as needed for anxiety., Disp: 30 tablet, Rfl: 1  •  amLODIPine (Norvasc) 10 mg tablet, Take 1 tablet (10 mg) by mouth once daily., Disp: , Rfl:   •  atorvastatin (Lipitor) 10 mg tablet, Take 1 tablet (10 mg) by mouth once daily., Disp: , Rfl:   •  calcium carbonate-vitamin D3 (Os-Jory 500 + D3) 500 mg-5 mcg (200 unit) tablet, Take 1 tablet by  mouth once daily., Disp: , Rfl:   •  folic acid/multivit-min/lutein (CENTRUM SILVER ORAL), Take 1 tablet by mouth once daily., Disp: , Rfl:   •  lidocaine (Lidoderm) 5 % patch, Place 1 patch on the skin once daily. Remove after 12 hours, Disp: , Rfl:   •  metoprolol succinate XL (Toprol-XL) 50 mg 24 hr tablet, take 1 tablet by mouth once daily, Disp: 30 tablet, Rfl: 5  •  mirtazapine (Remeron) 15 mg tablet, Take 1 tablet (15 mg) by mouth once daily at bedtime., Disp: , Rfl:   •  omeprazole (PriLOSEC) 20 mg DR capsule, Take 1 capsule (20 mg) by mouth once daily in the morning. Take before meals. 30 minutes prior, Disp: , Rfl:   •  tiZANidine (Zanaflex) 4 mg capsule, Take 1 capsule (4 mg) by mouth 3 times a day as needed for muscle spasms (pain) for up to 20 days., Disp: 30 capsule, Rfl: 1     Review of Systems  Constitutional:  Positive for weight gain. Negative for appetite change, chills, fatigue and fever.   HENT:  Negative for dental problem and trouble swallowing.    Eyes:  Negative for pain and visual disturbance.   Respiratory:  Negative for cough, shortness of breath and wheezing.    Cardiovascular:  Positive for edema. Negative for chest pain and palpitations.   Gastrointestinal:  Negative for abdominal distention, abdominal pain, constipation, diarrhea, nausea and vomiting.   Endocrine: Negative for cold intolerance and heat intolerance.   Genitourinary:  Negative for difficulty urinating.   Musculoskeletal: Positive for chronic back pain, unsteady gait.  Skin:  Negative for color change, pallor, rash and wound.   Allergic/Immunologic: Negative for environmental allergies and food allergies.   Neurological:  Negative for dizziness, seizures, weakness, numbness and headaches.   Hematological:  Negative for adenopathy. Does not bruise/bleed easily.   Psychiatric/Behavioral: Positive for anxiety. Negative for behavioral problems.     Objective  /60 (BP Location: Left arm, Patient Position: Sitting, BP  "Cuff Size: Adult)   Pulse 78   Temp 36.3 °C (97.3 °F) (Temporal)   Resp 18   Ht 1.664 m (5' 5.5\")   Wt 59 kg (130 lb)   SpO2 99%   BMI 21.30 kg/m²     Physical Exam  Constitutional:       General: She is awake.      Appearance: Normal appearance. She is well-developed.      Comments: Alert, sitting in chair with legs dependent   HENT:      Nose: Nose normal.      Mouth/Throat:      Mouth: Mucous membranes are moist.   Eyes:      Extraocular Movements: Extraocular movements intact.      Pupils: Pupils are equal, round, and reactive to light.   Cardiovascular:      Rate and Rhythm: Normal rate and regular rhythm.   Pulmonary:      Effort: Pulmonary effort is normal.      Breath sounds: Normal breath sounds.   Chest:      Chest wall: No tenderness.   Abdominal:      General: Abdomen is flat.      Palpations: Abdomen is soft. There is no mass.      Tenderness: There is no abdominal tenderness. There is no guarding.   Musculoskeletal:      Cervical back: Neck supple.      Right lower leg: edema present.      Left lower leg: edema present.      Comments: non pitting edema bilaterally   Neurological:      Mental Status: She is alert and oriented to person, place, and time.      Cranial Nerves: Cranial nerves 2-12 are intact.   Psychiatric:         Mood and Affect: Mood is anxious        Behavior: Behavior is cooperative.     Assessment/Plan  Diagnoses and all orders for this visit:  HTN (hypertension), benign  -     CBC; Future  -     Comprehensive metabolic panel; Future  -chronic, stable  -continue amlodipine, metoprolol   Bilateral leg edema  -chronic, stable  -recommend elevating LE during day   Mixed hyperlipidemia  -     Lipid panel; Future  -chronic, managed with Atorvastatin  Paroxysmal atrial fibrillation (CMS/HCC)  -chronic, stable  -HR regular, rate controlled  -continue Metoprolol  Chronic GERD  -chronic, stable  -no current GI concerns  -continue Omeprazole   Other chronic pain  -chronic, managed with " routine Tylenol, Tizanidine as needed, Lidocaine patch  Generalized anxiety disorder  -chronic, mood stable  -continue Mirtazepine, Alprazolam  Healthcare maintenance  -     Hemoglobin A1c; Future    Routine labs obtained in home- CBC, CMP, Lipid panel, A1c level- pt tolerated well      DANIAL Park     Electronically Signed By: DANIAL Park   4/2/24  1:08 PM

## 2024-04-01 NOTE — PROGRESS NOTES
Subjective   Patient ID: Stephanie Jimenez is a 88 y.o. female who presents for Follow-up (Routine follow up, chronic medical conditions, labs).    Visit for 87 y/o female seen today at [facility]. She is being seen for routine follow up with labs. She is sitting in her room watching TV. She is alert, oriented, able to answer all questions regarding her health. Patient does not drive or have a vehicle. She requires supervision by facility staff and is able to perform ADLs with minimal assistance. Patient denies appetite changes or signs of weight loss. She actually reports that she has gained weight, about 10 lbs because she is eating larger portions. She denies abdominal pain, nausea, vomiting. Denies bowel or bladder concerns. She denies difficulty sleeping. Continues to endorse anxiety. She takes Mirtazepine and Xanax with improvement. She does not see psych and does not want to establish care at this time. Pt is ambulatory with a walker. She denies recent falls or injuries. She endorses chronic low back pain. She takes Tylenol routinely and uses Salonpas patches with improvement. Pt has HTN. She does not routinely monitor her BP. She denies headaches, dizziness, chest pain, palpitations. She denies any acute concerns. Has not had any recent hospitalizations.          Current Outpatient Medications:     acetaminophen (Tylenol 8 HOUR) 650 mg ER tablet, Take 2 tablets (1,300 mg) by mouth every 8 hours., Disp: , Rfl:     ALPRAZolam (Xanax) 0.25 mg tablet, Take 1 tablet (0.25 mg) by mouth once daily as needed for anxiety., Disp: 30 tablet, Rfl: 1    amLODIPine (Norvasc) 10 mg tablet, Take 1 tablet (10 mg) by mouth once daily., Disp: , Rfl:     atorvastatin (Lipitor) 10 mg tablet, Take 1 tablet (10 mg) by mouth once daily., Disp: , Rfl:     calcium carbonate-vitamin D3 (Os-Jory 500 + D3) 500 mg-5 mcg (200 unit) tablet, Take 1 tablet by mouth once daily., Disp: , Rfl:     folic acid/multivit-min/lutein (CENTRUM  SILVER ORAL), Take 1 tablet by mouth once daily., Disp: , Rfl:     lidocaine (Lidoderm) 5 % patch, Place 1 patch on the skin once daily. Remove after 12 hours, Disp: , Rfl:     metoprolol succinate XL (Toprol-XL) 50 mg 24 hr tablet, take 1 tablet by mouth once daily, Disp: 30 tablet, Rfl: 5    mirtazapine (Remeron) 15 mg tablet, Take 1 tablet (15 mg) by mouth once daily at bedtime., Disp: , Rfl:     omeprazole (PriLOSEC) 20 mg DR capsule, Take 1 capsule (20 mg) by mouth once daily in the morning. Take before meals. 30 minutes prior, Disp: , Rfl:     tiZANidine (Zanaflex) 4 mg capsule, Take 1 capsule (4 mg) by mouth 3 times a day as needed for muscle spasms (pain) for up to 20 days., Disp: 30 capsule, Rfl: 1     Review of Systems  Constitutional:  Positive for weight gain. Negative for appetite change, chills, fatigue and fever.   HENT:  Negative for dental problem and trouble swallowing.    Eyes:  Negative for pain and visual disturbance.   Respiratory:  Negative for cough, shortness of breath and wheezing.    Cardiovascular:  Positive for edema. Negative for chest pain and palpitations.   Gastrointestinal:  Negative for abdominal distention, abdominal pain, constipation, diarrhea, nausea and vomiting.   Endocrine: Negative for cold intolerance and heat intolerance.   Genitourinary:  Negative for difficulty urinating.   Musculoskeletal: Positive for chronic back pain, unsteady gait.  Skin:  Negative for color change, pallor, rash and wound.   Allergic/Immunologic: Negative for environmental allergies and food allergies.   Neurological:  Negative for dizziness, seizures, weakness, numbness and headaches.   Hematological:  Negative for adenopathy. Does not bruise/bleed easily.   Psychiatric/Behavioral: Positive for anxiety. Negative for behavioral problems.     Objective   /60 (BP Location: Left arm, Patient Position: Sitting, BP Cuff Size: Adult)   Pulse 78   Temp 36.3 °C (97.3 °F) (Temporal)   Resp 18   Ht  "1.664 m (5' 5.5\")   Wt 59 kg (130 lb)   SpO2 99%   BMI 21.30 kg/m²     Physical Exam  Constitutional:       General: She is awake.      Appearance: Normal appearance. She is well-developed.      Comments: Alert, sitting in chair with legs dependent   HENT:      Nose: Nose normal.      Mouth/Throat:      Mouth: Mucous membranes are moist.   Eyes:      Extraocular Movements: Extraocular movements intact.      Pupils: Pupils are equal, round, and reactive to light.   Cardiovascular:      Rate and Rhythm: Normal rate and regular rhythm.   Pulmonary:      Effort: Pulmonary effort is normal.      Breath sounds: Normal breath sounds.   Chest:      Chest wall: No tenderness.   Abdominal:      General: Abdomen is flat.      Palpations: Abdomen is soft. There is no mass.      Tenderness: There is no abdominal tenderness. There is no guarding.   Musculoskeletal:      Cervical back: Neck supple.      Right lower leg: edema present.      Left lower leg: edema present.      Comments: non pitting edema bilaterally   Neurological:      Mental Status: She is alert and oriented to person, place, and time.      Cranial Nerves: Cranial nerves 2-12 are intact.   Psychiatric:         Mood and Affect: Mood is anxious        Behavior: Behavior is cooperative.     Assessment/Plan   Diagnoses and all orders for this visit:  HTN (hypertension), benign  -     CBC; Future  -     Comprehensive metabolic panel; Future  -chronic, stable  -continue amlodipine, metoprolol   Bilateral leg edema  -chronic, stable  -recommend elevating LE during day   Mixed hyperlipidemia  -     Lipid panel; Future  -chronic, managed with Atorvastatin  Paroxysmal atrial fibrillation (CMS/HCC)  -chronic, stable  -HR regular, rate controlled  -continue Metoprolol  Chronic GERD  -chronic, stable  -no current GI concerns  -continue Omeprazole   Other chronic pain  -chronic, managed with routine Tylenol, Tizanidine as needed, Lidocaine patch  Generalized anxiety " disorder  -chronic, mood stable  -continue Mirtazepine, Alprazolam  Healthcare maintenance  -     Hemoglobin A1c; Future    Routine labs obtained in home- CBC, CMP, Lipid panel, A1c level- pt tolerated well      YUNIOR Park-CNP

## 2024-04-03 ENCOUNTER — TELEPHONE (OUTPATIENT)
Dept: PRIMARY CARE | Facility: CLINIC | Age: 89
End: 2024-04-03
Payer: MEDICARE

## 2024-04-03 NOTE — TELEPHONE ENCOUNTER
Patient calling today stating she received your message, states she has had an increase in banana since moving to the [facility], eating one daily.  List of high potassium foods reviewed with patient who states she is more aware and will watch intake, cutting bananas back to twice weekly.

## 2024-05-08 ENCOUNTER — TELEPHONE (OUTPATIENT)
Dept: PRIMARY CARE | Facility: CLINIC | Age: 89
End: 2024-05-08
Payer: MEDICARE

## 2024-05-08 NOTE — TELEPHONE ENCOUNTER
Patients daughter called stating Stephanie is moving into an assisted living facility and they will be calling to get an updated med list bc they do in house refills.  The patient takes rapid release tylenol a few times a day, they will require a prescription for that even though it is an over the counter. Patients daughter requested that Stephanie's xanax and the tylenol prescription read as needed otherwise they will be billed for each individual pill she is given, rather than the entire prescription.

## 2024-05-13 ENCOUNTER — TELEPHONE (OUTPATIENT)
Dept: PRIMARY CARE | Facility: CLINIC | Age: 89
End: 2024-05-13
Payer: MEDICARE

## 2024-05-13 NOTE — TELEPHONE ENCOUNTER
Patients daughter called concerning the paperwork that the living facility faxed over so that she could become an active resident., she is asking if paperwork was received and when to expect it be completed.

## 2024-05-15 ENCOUNTER — PATIENT MESSAGE (OUTPATIENT)
Dept: PRIMARY CARE | Facility: CLINIC | Age: 89
End: 2024-05-15

## 2024-05-18 DIAGNOSIS — F41.1 ANXIETY STATE: ICD-10-CM

## 2024-05-20 DIAGNOSIS — F41.1 ANXIETY STATE: ICD-10-CM

## 2024-05-20 RX ORDER — ALPRAZOLAM 0.25 MG/1
0.25 TABLET ORAL DAILY PRN
Qty: 30 TABLET | Refills: 0 | Status: CANCELLED | OUTPATIENT
Start: 2024-05-20

## 2024-05-20 RX ORDER — ALPRAZOLAM 0.25 MG/1
0.25 TABLET ORAL DAILY PRN
Qty: 30 TABLET | Refills: 0 | Status: SHIPPED | OUTPATIENT
Start: 2024-05-20 | End: 2024-05-21 | Stop reason: SDUPTHER

## 2024-05-20 NOTE — TELEPHONE ENCOUNTER
See RX  Last seen: 11/14/23    Also FYI - Pt states she is now staying at [facility]

## 2024-05-20 NOTE — TELEPHONE ENCOUNTER
Medication already sent in today.   Meets 2/4 SIRS criteria (Temp, HR) - low suspicion for infection   - s/p 1.5L LR in   - s/p cipro/metro  - U/A negative   - blood cx NGTD  - Per GI hold abx, start systemic steroids for UC flare 2/2 Ulcerative colitis flare  Follows with [facility] Dr. Tromba   - Last colonoscopy one year ago   - CT abdomen: Mild diffuse colonic wall thickening, most pronounced involving the ascending and transverse colon  - hold mesalamine, start systemic steroids for UC flare  - GI PCR not detected  - Per GI will undergo flex sig.  - Per cards, optimized for procedure  - Quant gold sent, counseled patient about humera

## 2024-05-21 DIAGNOSIS — F41.1 ANXIETY STATE: ICD-10-CM

## 2024-05-21 RX ORDER — ALPRAZOLAM 0.25 MG/1
0.25 TABLET ORAL DAILY PRN
Qty: 30 TABLET | Refills: 2 | Status: SHIPPED | OUTPATIENT
Start: 2024-05-21 | End: 2024-06-04 | Stop reason: SDUPTHER

## 2024-05-21 NOTE — TELEPHONE ENCOUNTER
Pt. At [facility]. Requesting refill of alprazolam.  Moved to [facility] 5/16/24

## 2024-06-04 DIAGNOSIS — F41.1 ANXIETY STATE: ICD-10-CM

## 2024-06-05 RX ORDER — MIRTAZAPINE 15 MG/1
TABLET, FILM COATED ORAL
Qty: 30 TABLET | Refills: 0 | Status: SHIPPED | OUTPATIENT
Start: 2024-06-05

## 2024-06-05 RX ORDER — ALPRAZOLAM 0.25 MG/1
0.25 TABLET ORAL DAILY PRN
Qty: 30 TABLET | Refills: 5 | Status: SHIPPED | OUTPATIENT
Start: 2024-06-05

## 2024-06-11 ENCOUNTER — TELEPHONE (OUTPATIENT)
Dept: PRIMARY CARE | Facility: CLINIC | Age: 89
End: 2024-06-11
Payer: MEDICARE

## 2024-06-11 DIAGNOSIS — Z96.641 STATUS POST RIGHT HIP REPLACEMENT: Primary | ICD-10-CM

## 2024-06-11 RX ORDER — TRAMADOL HYDROCHLORIDE 50 MG/1
50 TABLET ORAL EVERY 6 HOURS PRN
Qty: 20 TABLET | Refills: 0 | Status: SHIPPED | OUTPATIENT
Start: 2024-06-11 | End: 2024-06-16

## 2024-06-11 NOTE — TELEPHONE ENCOUNTER
Pharmacy called requesting refill on medication:Tramadol 50mg 1 tab po 3x daily prn for severe pain (did not see on med list)    Last seen: 11/14/23

## 2024-06-21 ENCOUNTER — TELEPHONE (OUTPATIENT)
Dept: PRIMARY CARE | Facility: CLINIC | Age: 89
End: 2024-06-21
Payer: MEDICARE

## 2024-06-21 NOTE — TELEPHONE ENCOUNTER
Patient is an assisted living ([facility]) and the patient is having a lot of back pain and discomfort which is nothing new for her, however, her daughter is law is wondering if a brace would help her or if she should make an appointment for an evaluation?

## 2024-06-26 ENCOUNTER — DOCUMENTATION (OUTPATIENT)
Dept: HOME HEALTH SERVICES | Facility: HOME HEALTH | Age: 89
End: 2024-06-26

## 2024-06-26 ENCOUNTER — OFFICE VISIT (OUTPATIENT)
Dept: PRIMARY CARE | Facility: CLINIC | Age: 89
End: 2024-06-26
Payer: MEDICARE

## 2024-06-26 ENCOUNTER — HOME HEALTH ADMISSION (OUTPATIENT)
Dept: HOME HEALTH SERVICES | Facility: HOME HEALTH | Age: 89
End: 2024-06-26
Payer: MEDICARE

## 2024-06-26 ENCOUNTER — HOSPITAL ENCOUNTER (OUTPATIENT)
Dept: RADIOLOGY | Facility: CLINIC | Age: 89
Discharge: HOME | End: 2024-06-26
Payer: MEDICARE

## 2024-06-26 VITALS
HEIGHT: 65 IN | WEIGHT: 134 LBS | BODY MASS INDEX: 22.33 KG/M2 | SYSTOLIC BLOOD PRESSURE: 132 MMHG | DIASTOLIC BLOOD PRESSURE: 72 MMHG | HEART RATE: 79 BPM | OXYGEN SATURATION: 98 %

## 2024-06-26 DIAGNOSIS — M54.50 ACUTE LEFT-SIDED LOW BACK PAIN WITHOUT SCIATICA: ICD-10-CM

## 2024-06-26 DIAGNOSIS — M54.50 ACUTE LEFT-SIDED LOW BACK PAIN WITHOUT SCIATICA: Primary | ICD-10-CM

## 2024-06-26 PROCEDURE — 1159F MED LIST DOCD IN RCRD: CPT | Performed by: STUDENT IN AN ORGANIZED HEALTH CARE EDUCATION/TRAINING PROGRAM

## 2024-06-26 PROCEDURE — 72110 X-RAY EXAM L-2 SPINE 4/>VWS: CPT | Performed by: RADIOLOGY

## 2024-06-26 PROCEDURE — 99213 OFFICE O/P EST LOW 20 MIN: CPT | Performed by: STUDENT IN AN ORGANIZED HEALTH CARE EDUCATION/TRAINING PROGRAM

## 2024-06-26 PROCEDURE — 1125F AMNT PAIN NOTED PAIN PRSNT: CPT | Performed by: STUDENT IN AN ORGANIZED HEALTH CARE EDUCATION/TRAINING PROGRAM

## 2024-06-26 PROCEDURE — 1036F TOBACCO NON-USER: CPT | Performed by: STUDENT IN AN ORGANIZED HEALTH CARE EDUCATION/TRAINING PROGRAM

## 2024-06-26 PROCEDURE — 3075F SYST BP GE 130 - 139MM HG: CPT | Performed by: STUDENT IN AN ORGANIZED HEALTH CARE EDUCATION/TRAINING PROGRAM

## 2024-06-26 PROCEDURE — 3078F DIAST BP <80 MM HG: CPT | Performed by: STUDENT IN AN ORGANIZED HEALTH CARE EDUCATION/TRAINING PROGRAM

## 2024-06-26 PROCEDURE — 72110 X-RAY EXAM L-2 SPINE 4/>VWS: CPT

## 2024-06-26 PROCEDURE — 1123F ACP DISCUSS/DSCN MKR DOCD: CPT | Performed by: STUDENT IN AN ORGANIZED HEALTH CARE EDUCATION/TRAINING PROGRAM

## 2024-06-26 RX ORDER — TRAMADOL HYDROCHLORIDE 50 MG/1
50 TABLET ORAL
COMMUNITY

## 2024-06-26 ASSESSMENT — PATIENT HEALTH QUESTIONNAIRE - PHQ9
3. TROUBLE FALLING OR STAYING ASLEEP OR SLEEPING TOO MUCH: NEARLY EVERY DAY
5. POOR APPETITE OR OVEREATING: NEARLY EVERY DAY
SUM OF ALL RESPONSES TO PHQ QUESTIONS 1-9: 15
SUM OF ALL RESPONSES TO PHQ9 QUESTIONS 1 AND 2: 6
8. MOVING OR SPEAKING SO SLOWLY THAT OTHER PEOPLE COULD HAVE NOTICED. OR THE OPPOSITE, BEING SO FIGETY OR RESTLESS THAT YOU HAVE BEEN MOVING AROUND A LOT MORE THAN USUAL: NOT AT ALL
1. LITTLE INTEREST OR PLEASURE IN DOING THINGS: NEARLY EVERY DAY
2. FEELING DOWN, DEPRESSED OR HOPELESS: NEARLY EVERY DAY
9. THOUGHTS THAT YOU WOULD BE BETTER OFF DEAD, OR OF HURTING YOURSELF: NOT AT ALL
6. FEELING BAD ABOUT YOURSELF - OR THAT YOU ARE A FAILURE OR HAVE LET YOURSELF OR YOUR FAMILY DOWN: NOT AT ALL
7. TROUBLE CONCENTRATING ON THINGS, SUCH AS READING THE NEWSPAPER OR WATCHING TELEVISION: NOT AT ALL
4. FEELING TIRED OR HAVING LITTLE ENERGY: NEARLY EVERY DAY
10. IF YOU CHECKED OFF ANY PROBLEMS, HOW DIFFICULT HAVE THESE PROBLEMS MADE IT FOR YOU TO DO YOUR WORK, TAKE CARE OF THINGS AT HOME, OR GET ALONG WITH OTHER PEOPLE: VERY DIFFICULT

## 2024-06-26 ASSESSMENT — PAIN SCALES - GENERAL: PAINLEVEL: 10-WORST PAIN EVER

## 2024-06-26 NOTE — HH CARE COORDINATION
Home Care received a Referral for Physical Therapy and Occupational Therapy. We have processed the referral for a Start of Care on 6/29.     If you have any questions or concerns, please feel free to contact us at [phone]. Follow the prompts, enter your five digit zip code, and you will be directed to your care team on E[facility] 1.

## 2024-06-26 NOTE — PROGRESS NOTES
"Subjective   Patient ID: Stephanie Jimenez is a 88 y.o. female who presents for No chief complaint on file..    HPI  89 yo female here for back pain  Back pain  Pain for 1-2 weeks  Woke up with pain  Pain in left low back  Getting up and walking causes pain  On tramadol, tylenol  Lidocaine patches    Review of Systems  All pertinent positive symptoms are included in the history of present illness.    All other systems have been reviewed and are negative and noncontributory to this patient's current ailments.     Allergies   Allergen Reactions    Montelukast Hallucinations    Morphine Nausea And Vomiting    Indapamide Headache    Methylprednisolone Other     Jittery      Quinapril Unknown    Triamterene-Hydrochlorothiazid Headache        Immunization History   Administered Date(s) Administered    Flu vaccine, quadrivalent, high-dose, preservative free, age 65y+ (FLUZONE) 09/22/2020, 09/30/2021    Influenza, High Dose Seasonal, Preservative Free 10/14/2015, 09/19/2016, 09/07/2017, 09/04/2018    Influenza, seasonal, injectable 10/13/2010, 09/08/2011, 08/17/2012    Pneumococcal conjugate vaccine, 13-valent (PREVNAR 13) 07/14/2015    Pneumococcal polysaccharide vaccine, 23-valent, age 2 years and older (PNEUMOVAX 23) 07/10/2017    Tdap vaccine, age 7 year and older (BOOSTRIX, ADACEL) 05/07/2007, 09/22/2020    Zoster, live 05/07/2007       Objective   Vitals:    06/26/24 1035   BP: 132/72   Pulse: 79   SpO2: 98%   Weight: 60.8 kg (134 lb)   Height: 1.651 m (5' 5\")       Physical Exam  CONSTITUTIONAL - well nourished, well developed, looks like stated age, in no acute distress  SKIN - normal skin color and pigmentation. No rash, lesions, or nodules visualized  HEAD - no trauma, normocephalic  EYES - extraocular muscles are intact  ENT - atraumatic  NECK - no neck mass was observed  LUNGS - unlabored breathing  ABDOMEN - nondistended  EXTREMITIES - no edema, no deformities  MSK - Back: PTP in L lumbar back, " hypertonicity  NEUROLOGICAL - alert, oriented and no focal signs  PSYCHIATRIC - alert, pleasant and cordial, age-appropriate  IMMUNOLOGIC - no cervical lymphadenopathy     Assessment/Plan   89 yo female here for back pain  Left sided back pain  Xray  Continue tramadol, tylenol, tizanidine  Start heat pad  At home PT    RTC as needed

## 2024-06-27 ENCOUNTER — TELEPHONE (OUTPATIENT)
Dept: PRIMARY CARE | Facility: CLINIC | Age: 89
End: 2024-06-27
Payer: MEDICARE

## 2024-06-27 DIAGNOSIS — M54.50 ACUTE LEFT-SIDED LOW BACK PAIN WITHOUT SCIATICA: Primary | ICD-10-CM

## 2024-06-27 NOTE — TELEPHONE ENCOUNTER
Pt daughter called requesting results of her x-ray. She also states the tramadol prescribed is not helping with the pt, feels she needs something stronger. Pt daughter would like advice

## 2024-06-28 ENCOUNTER — TELEPHONE (OUTPATIENT)
Dept: PRIMARY CARE | Facility: CLINIC | Age: 89
End: 2024-06-28
Payer: MEDICARE

## 2024-06-28 NOTE — TELEPHONE ENCOUNTER
Phoned Leanne Caba/ daughter to confirm 7/1/24 House Calls visit with Alisha Torrez NP, no answer, unable to leave a message.

## 2024-06-28 NOTE — TELEPHONE ENCOUNTER
JUANITA Brito - Phoned Leanne/ daughter to confirm 7/1/24 House Calls visit. Per daughter patient currently resides at [facility], [address], [address]. Daughter reported patient was seen 6/26/24 by Esther Landin DO for back pain and xrays were obtained. She reported patient did not have any labs drawn at that visit. Daughter questioned visit and if you visit the [facility]. Advised this was a scheduled follow up visit and your per [place] calendar labs are to be drawn. Daughter expressed understanding and is in agreement with your visit.

## 2024-06-30 DIAGNOSIS — S32.050A COMPRESSION FRACTURE OF L5 VERTEBRA, INITIAL ENCOUNTER (MULTI): Primary | ICD-10-CM

## 2024-07-01 ENCOUNTER — TELEPHONE (OUTPATIENT)
Dept: PRIMARY CARE | Facility: CLINIC | Age: 89
End: 2024-07-01

## 2024-07-01 ENCOUNTER — LAB (OUTPATIENT)
Dept: LAB | Facility: LAB | Age: 89
End: 2024-07-01
Payer: MEDICARE

## 2024-07-01 ENCOUNTER — HOME VISIT (OUTPATIENT)
Dept: POST ACUTE CARE | Facility: EXTERNAL LOCATION | Age: 89
End: 2024-07-01
Payer: MEDICARE

## 2024-07-01 VITALS
SYSTOLIC BLOOD PRESSURE: 126 MMHG | RESPIRATION RATE: 16 BRPM | DIASTOLIC BLOOD PRESSURE: 68 MMHG | TEMPERATURE: 97.7 F | HEIGHT: 65 IN | HEART RATE: 80 BPM | WEIGHT: 134 LBS | BODY MASS INDEX: 22.33 KG/M2 | OXYGEN SATURATION: 98 %

## 2024-07-01 DIAGNOSIS — E87.5 HYPERKALEMIA: ICD-10-CM

## 2024-07-01 DIAGNOSIS — M54.50 CHRONIC LEFT-SIDED LOW BACK PAIN WITHOUT SCIATICA: Primary | ICD-10-CM

## 2024-07-01 DIAGNOSIS — G89.29 CHRONIC LEFT-SIDED LOW BACK PAIN WITHOUT SCIATICA: Primary | ICD-10-CM

## 2024-07-01 DIAGNOSIS — I10 HTN (HYPERTENSION), BENIGN: ICD-10-CM

## 2024-07-01 DIAGNOSIS — I48.0 PAROXYSMAL ATRIAL FIBRILLATION (MULTI): ICD-10-CM

## 2024-07-01 LAB
ANION GAP SERPL CALC-SCNC: 12 MMOL/L
BUN SERPL-MCNC: 11 MG/DL (ref 8–25)
CALCIUM SERPL-MCNC: 9.2 MG/DL (ref 8.5–10.4)
CHLORIDE SERPL-SCNC: 95 MMOL/L (ref 97–107)
CO2 SERPL-SCNC: 25 MMOL/L (ref 24–31)
CREAT SERPL-MCNC: 0.7 MG/DL (ref 0.4–1.6)
EGFRCR SERPLBLD CKD-EPI 2021: 84 ML/MIN/1.73M*2
ERYTHROCYTE [DISTWIDTH] IN BLOOD BY AUTOMATED COUNT: 12.4 % (ref 11.5–14.5)
GLUCOSE SERPL-MCNC: 157 MG/DL (ref 65–99)
HCT VFR BLD AUTO: 39.8 % (ref 36–46)
HGB BLD-MCNC: 13 G/DL (ref 12–16)
MCH RBC QN AUTO: 30.4 PG (ref 26–34)
MCHC RBC AUTO-ENTMCNC: 32.7 G/DL (ref 32–36)
MCV RBC AUTO: 93 FL (ref 80–100)
NRBC BLD-RTO: 0 /100 WBCS (ref 0–0)
PLATELET # BLD AUTO: 367 X10*3/UL (ref 150–450)
POTASSIUM SERPL-SCNC: 4.5 MMOL/L (ref 3.4–5.1)
RBC # BLD AUTO: 4.28 X10*6/UL (ref 4–5.2)
SODIUM SERPL-SCNC: 132 MMOL/L (ref 133–145)
WBC # BLD AUTO: 9.2 X10*3/UL (ref 4.4–11.3)

## 2024-07-01 PROCEDURE — 36415 COLL VENOUS BLD VENIPUNCTURE: CPT

## 2024-07-01 PROCEDURE — 85027 COMPLETE CBC AUTOMATED: CPT

## 2024-07-01 PROCEDURE — 99349 HOME/RES VST EST MOD MDM 40: CPT | Performed by: NURSE PRACTITIONER

## 2024-07-01 PROCEDURE — 80048 BASIC METABOLIC PNL TOTAL CA: CPT

## 2024-07-01 PROCEDURE — 36415 COLL VENOUS BLD VENIPUNCTURE: CPT | Performed by: NURSE PRACTITIONER

## 2024-07-01 RX ORDER — HYDROCODONE BITARTRATE AND ACETAMINOPHEN 5; 325 MG/1; MG/1
1 TABLET ORAL EVERY 6 HOURS PRN
Qty: 20 TABLET | Refills: 0 | Status: SHIPPED | OUTPATIENT
Start: 2024-07-01 | End: 2024-07-10 | Stop reason: SDUPTHER

## 2024-07-01 ASSESSMENT — PAIN SCALES - GENERAL: PAINLEVEL: 10-WORST PAIN EVER

## 2024-07-01 NOTE — PROGRESS NOTES
Subjective   Patient ID: Stephanie Jimenez is a 88 y.o. female who presents for Follow-up (Routine 3 month follow, labs).    Visit for 87 y/o female seen today at [facility] in [place] for routine 3 month follow up. Patient was initially in the bathroom upon provider arrival. She was able to ambulate into her living room and is sitting in chair now but had difficulty due to worsening back pain. She has had issues with chronic lower back pain for several years but the pain has worsened over the past 4 weeks. She did see Dr. Esther Landin on 6/26 and had x-ray showing worsening compression fractures. Pt continues to take Tramadol, usually once per day and has been using a lidocaine patch with minimal relief. She is going to start PT/OT this week and is hoping she might be able to get a back brace for the pain. She is sitting in her recliner most of the day and it does have a heating pad function that she has been using daily. Pt will also be establishing care with pain management specialist on 7/10. She is ambulating with her walker. Denies recent fall or injury. Pt is alert, oriented, able to answer all questions regarding her health. She is able to perform all ADLs with minimal assistance but does require facility to prepare all meals for her. She denies appetite changes or signs of weight loss. She does admit that she does not eat as much if she is having severe pain but feels she has adequate hydration and nutrition. She denies abdominal pain, nausea, vomiting. Endorses occasional constipation, mostly if taking pain medications. She will use a stool softener as needed with improvement. She denies any bladder concerns. Denies difficulty sleeping. Pt had labs in April. Her potassium was elevated at 5.3. She is due for repeat labs today.          Current Outpatient Medications:     acetaminophen (Tylenol 8 HOUR) 650 mg ER tablet, Take 2 tablets (1,300 mg) by mouth every 8 hours if needed for mild pain  (1 - 3), moderate pain (4 - 6) or headaches., Disp: , Rfl:     ALPRAZolam (Xanax) 0.25 mg tablet, Take 1 tablet (0.25 mg) by mouth once daily as needed for anxiety., Disp: 30 tablet, Rfl: 5    amLODIPine (Norvasc) 10 mg tablet, Take 1 tablet (10 mg) by mouth once daily., Disp: , Rfl:     atorvastatin (Lipitor) 10 mg tablet, Take 1 tablet (10 mg) by mouth once daily., Disp: , Rfl:     calcium carbonate-vitamin D3 (Os-Jory 500 + D3) 500 mg-5 mcg (200 unit) tablet, Take 1 tablet by mouth once daily., Disp: , Rfl:     folic acid/multivit-min/lutein (CENTRUM SILVER ORAL), Take 1 tablet by mouth once daily., Disp: , Rfl:     lidocaine (Lidoderm) 5 % patch, Place 1 patch on the skin once daily. Remove after 12 hours, Disp: , Rfl:     metoprolol succinate XL (Toprol-XL) 50 mg 24 hr tablet, take 1 tablet by mouth once daily, Disp: 30 tablet, Rfl: 5    mirtazapine (Remeron) 15 mg tablet, TAKE ONE TABLET BY MOUTH AT BEDTIME ONCE A DAY, Disp: 30 tablet, Rfl: 0    omeprazole (PriLOSEC) 20 mg DR capsule, Take 1 capsule (20 mg) by mouth once daily in the morning. Take before meals. 30 minutes prior, Disp: , Rfl:     tiZANidine (Zanaflex) 4 mg capsule, Take 1 capsule (4 mg) by mouth 3 times a day as needed for muscle spasms (pain) for up to 20 days., Disp: 30 capsule, Rfl: 1    traMADol (Ultram) 50 mg tablet, Take 1 tablet (50 mg) by mouth., Disp: , Rfl:      Review of Systems  Constitutional: Negative for appetite change, chills, fatigue and fever.   HENT:  Negative for dental problem and trouble swallowing.    Eyes:  Negative for pain and visual disturbance.   Respiratory:  Negative for cough, shortness of breath and wheezing.    Cardiovascular:  Negative for chest pain,  palpitations, edema.   Gastrointestinal: Positive for occasional constipation. Negative for abdominal distention, abdominal pain, diarrhea, nausea and vomiting.   Endocrine: Negative for cold intolerance and heat intolerance.   Genitourinary:  Negative for  "difficulty urinating.   Musculoskeletal: Positive for chronic back pain, worsening, unsteady gait.  Skin:  Negative for color change, pallor, rash and wound.   Allergic/Immunologic: Negative for environmental allergies and food allergies.   Neurological:  Negative for dizziness, seizures, weakness, numbness and headaches.   Hematological:  Negative for adenopathy. Does not bruise/bleed easily.   Psychiatric/Behavioral: Positive for anxiety. Negative for behavioral problems.     Objective   /68 (BP Location: Left arm, Patient Position: Sitting, BP Cuff Size: Adult)   Pulse 80   Temp 36.5 °C (97.7 °F) (Temporal)   Resp 16   Ht 1.651 m (5' 5\")   Wt 60.8 kg (134 lb)   SpO2 98%   BMI 22.30 kg/m²     Physical Exam  Constitutional:       General: She is awake.      Appearance: Normal appearance. She is well-developed.      Comments: Alert, no acute distress  HENT:      Nose: Nose normal.      Mouth/Throat:      Mouth: Mucous membranes are moist.   Eyes:      Extraocular Movements: Extraocular movements intact.      Pupils: Pupils are equal, round, and reactive to light.   Cardiovascular:      Rate and Rhythm: Normal rate and regular rhythm.      No edema.   Pulmonary:      Effort: Pulmonary effort is normal.      Breath sounds: Normal breath sounds.   Chest:      Chest wall: No tenderness.   Abdominal:      General: Abdomen is flat.      Palpations: Abdomen is soft. There is no mass.      Tenderness: There is no abdominal tenderness. There is no guarding.   Musculoskeletal:      Cervical back: Neck supple.      Tenderness across lumbar spine, worse on left   Neurological:      Mental Status: She is alert and oriented to person, place, and time.      Cranial Nerves: Cranial nerves 2-12 are intact.   Psychiatric:         Mood and Affect: Mood is anxious        Behavior: Behavior is cooperative.     === 06/26/24 ===    XR LUMBAR SPINE COMPLETE 4+ VIEWS    - Impression -  Interval development of and progression of " multiple vertebral  compression fractures with increasing kyphosis. If deemed clinically  appropriate, MRI can be considered for further assessment.    Signed by: Rod Lee 6/27/2024 9:40 AM  Dictation workstation:   [id number]     Assessment/Plan   Diagnoses and all orders for this visit:  Chronic left-sided low back pain without sciatica  -chronic, pain worsened  -continue tramadol/tizanidine for pain  -continue heating pad to back  -follow up with pain mgmt as scheduled     Hyperkalemia  -     Basic metabolic panel; Future  -will repeat K+ level today    HTN (hypertension), benign  -     CBC; Future  -chronic, vitals stable        -continue amlodipine, metoprolol     Paroxysmal atrial fibrillation (Multi)  -chronic, stable  -HR regular, rate controlled   -continue metoprolol    Routine labs obtained- CBC, BMP- pt tolerated well        YUNIOR Park-CNP

## 2024-07-01 NOTE — TELEPHONE ENCOUNTER
----- Message from Henri Ordaz MD sent at 7/1/2024  1:10 PM EDT -----  Regarding: FW: Stephanie Jimenez  Contact: [phone]  Sent hydrocodone to local pharmacy.  Contact family and facility to change tramadol to hydrocodone.    ----- Message -----  From: Jo Ann Fowler MA  Sent: 7/1/2024  11:11 AM EDT  To: Henri Ordaz MD  Subject: FW: Stephanie Torresaveryscott                               ----- Message -----  From: Stephanie Jimenez  Sent: 7/1/2024  11:00 AM EDT  To: [facility] Ramos [place] [id number] Clinical Support Staff  Subject: Stephanie Jimenez                                 Pain magmnt is scheduled for 7/10.  My mom is not getting any relief from tramadol.  Is there anything stronger she can get?  Dr. Landin stated she can take the tizanzadine and tramadol together however, her assisted living nurse only gives her one at a time alternating them.  Please advise? If she cannot get anything stronger, Can she take at same time?  If so can you advise the AL facility nurse of that?    DaughterJason Caba  [phone]

## 2024-07-01 NOTE — TELEPHONE ENCOUNTER
----- Message from Henri Ordaz MD sent at 7/1/2024  1:10 PM EDT -----  Regarding: FW: Stephanie Jimenez  Contact: [phone]  Sent hydrocodone to local pharmacy.  Contact family and facility to change tramadol to hydrocodone.    ----- Message -----  From: Jo Ann Fowler MA  Sent: 7/1/2024  11:11 AM EDT  To: Henri Ordaz MD  Subject: FW: Stephanie Torresaveryscott                               ----- Message -----  From: Stephanie Jimenez  Sent: 7/1/2024  11:00 AM EDT  To: Nolan Ramos [place] [id number] Clinical Support Staff  Subject: Stephanie Jimenez                                 Pain magmnt is scheduled for 7/10.  My mom is not getting any relief from tramadol.  Is there anything stronger she can get?  Dr. Landin stated she can take the tizanzadine and tramadol together however, her assisted living nurse only gives her one at a time alternating them.  Please advise? If she cannot get anything stronger, Can she take at same time?  If so can you advise the AL facility nurse of that?    DaughterJason Caba  [phone]

## 2024-07-01 NOTE — TELEPHONE ENCOUNTER
Umberto called from [facility] asking for verbal order.called back left voice message asking for Umberto to call back.

## 2024-07-02 ENCOUNTER — HOME CARE VISIT (OUTPATIENT)
Dept: HOME HEALTH SERVICES | Facility: HOME HEALTH | Age: 89
End: 2024-07-02
Payer: MEDICARE

## 2024-07-02 VITALS
TEMPERATURE: 97.6 F | RESPIRATION RATE: 18 BRPM | HEART RATE: 82 BPM | OXYGEN SATURATION: 98 % | DIASTOLIC BLOOD PRESSURE: 68 MMHG | SYSTOLIC BLOOD PRESSURE: 130 MMHG

## 2024-07-02 PROCEDURE — G0151 HHCP-SERV OF PT,EA 15 MIN: HCPCS

## 2024-07-02 SDOH — HEALTH STABILITY: PHYSICAL HEALTH: EXERCISE ACTIVITIES SETS: 1

## 2024-07-02 SDOH — HEALTH STABILITY: PHYSICAL HEALTH: PHYSICAL EXERCISE: SITTING

## 2024-07-02 SDOH — HEALTH STABILITY: PHYSICAL HEALTH: PHYSICAL EXERCISE: 10

## 2024-07-02 SDOH — HEALTH STABILITY: PHYSICAL HEALTH

## 2024-07-02 SDOH — HEALTH STABILITY: PHYSICAL HEALTH: EXERCISE ACTIVITY: APS, MARCHING, LAQ, HIP ABD/ADD

## 2024-07-02 ASSESSMENT — ENCOUNTER SYMPTOMS
PAIN LOCATION - EXACERBATING FACTORS: MOVEMENT
PAIN LOCATION - PAIN QUALITY: ACHING
PAIN LOCATION - PAIN SEVERITY: 8/10
PERSON REPORTING PAIN: PATIENT
MUSCLE WEAKNESS: 1
PAIN LOCATION - RELIEVING FACTORS: REST, ICE, MEDS
PAIN: 1
LOWEST PAIN SEVERITY IN PAST 24 HOURS: 6/10
SUBJECTIVE PAIN PROGRESSION: UNCHANGED
PAIN LOCATION: BACK
HIGHEST PAIN SEVERITY IN PAST 24 HOURS: 8/10
OCCASIONAL FEELINGS OF UNSTEADINESS: 1

## 2024-07-02 ASSESSMENT — ACTIVITIES OF DAILY LIVING (ADL)
AMBULATION ASSISTANCE ON FLAT SURFACES: 1
CURRENT_FUNCTION: CONTACT GUARD ASSIST
PHYSICAL TRANSFERS ASSESSED: 1
ENTERING_EXITING_HOME: CONTACT GUARD ASSIST
CURRENT_FUNCTION: ONE PERSON
AMBULATION ASSISTANCE: 1
AMBULATION ASSISTANCE: CONTACT GUARD ASSIST
OASIS_M1830: 03
AMBULATION ASSISTANCE: ONE PERSON
AMBULATION_DISTANCE/DURATION_TOLERATED: 50 FEET

## 2024-07-03 ENCOUNTER — HOME CARE VISIT (OUTPATIENT)
Dept: HOME HEALTH SERVICES | Facility: HOME HEALTH | Age: 89
End: 2024-07-03
Payer: MEDICARE

## 2024-07-10 ENCOUNTER — HOME CARE VISIT (OUTPATIENT)
Dept: HOME HEALTH SERVICES | Facility: HOME HEALTH | Age: 89
End: 2024-07-10
Payer: MEDICARE

## 2024-07-10 ENCOUNTER — OFFICE VISIT (OUTPATIENT)
Dept: PAIN MEDICINE | Facility: CLINIC | Age: 89
End: 2024-07-10
Payer: MEDICARE

## 2024-07-10 VITALS
BODY MASS INDEX: 22.33 KG/M2 | WEIGHT: 134 LBS | HEART RATE: 82 BPM | SYSTOLIC BLOOD PRESSURE: 130 MMHG | RESPIRATION RATE: 18 BRPM | DIASTOLIC BLOOD PRESSURE: 84 MMHG | HEIGHT: 65 IN

## 2024-07-10 DIAGNOSIS — M54.14 THORACIC RADICULOPATHY: ICD-10-CM

## 2024-07-10 DIAGNOSIS — M54.50 LOW BACK PAIN, UNSPECIFIED BACK PAIN LATERALITY, UNSPECIFIED CHRONICITY, UNSPECIFIED WHETHER SCIATICA PRESENT: ICD-10-CM

## 2024-07-10 DIAGNOSIS — M80.08XA AGE-RELATED OSTEOPOROSIS WITH CURRENT PATHOLOGICAL FRACTURE OF VERTEBRA, INITIAL ENCOUNTER (MULTI): Primary | ICD-10-CM

## 2024-07-10 DIAGNOSIS — S32.050A COMPRESSION FRACTURE OF L5 VERTEBRA, INITIAL ENCOUNTER (MULTI): ICD-10-CM

## 2024-07-10 PROCEDURE — 1123F ACP DISCUSS/DSCN MKR DOCD: CPT | Performed by: ANESTHESIOLOGY

## 2024-07-10 PROCEDURE — G0152 HHCP-SERV OF OT,EA 15 MIN: HCPCS

## 2024-07-10 PROCEDURE — 3075F SYST BP GE 130 - 139MM HG: CPT | Performed by: ANESTHESIOLOGY

## 2024-07-10 PROCEDURE — 1159F MED LIST DOCD IN RCRD: CPT | Performed by: ANESTHESIOLOGY

## 2024-07-10 PROCEDURE — 99204 OFFICE O/P NEW MOD 45 MIN: CPT | Performed by: ANESTHESIOLOGY

## 2024-07-10 PROCEDURE — 3079F DIAST BP 80-89 MM HG: CPT | Performed by: ANESTHESIOLOGY

## 2024-07-10 PROCEDURE — 1036F TOBACCO NON-USER: CPT | Performed by: ANESTHESIOLOGY

## 2024-07-10 PROCEDURE — 99214 OFFICE O/P EST MOD 30 MIN: CPT | Performed by: ANESTHESIOLOGY

## 2024-07-10 PROCEDURE — 1125F AMNT PAIN NOTED PAIN PRSNT: CPT | Performed by: ANESTHESIOLOGY

## 2024-07-10 PROCEDURE — 1160F RVW MEDS BY RX/DR IN RCRD: CPT | Performed by: ANESTHESIOLOGY

## 2024-07-10 RX ORDER — HYDROCODONE BITARTRATE AND ACETAMINOPHEN 5; 325 MG/1; MG/1
1 TABLET ORAL 2 TIMES DAILY PRN
Qty: 28 TABLET | Refills: 0 | Status: SHIPPED | OUTPATIENT
Start: 2024-07-10 | End: 2024-07-24

## 2024-07-10 ASSESSMENT — PATIENT HEALTH QUESTIONNAIRE - PHQ9
SUM OF ALL RESPONSES TO PHQ9 QUESTIONS 1 AND 2: 0
1. LITTLE INTEREST OR PLEASURE IN DOING THINGS: NOT AT ALL
2. FEELING DOWN, DEPRESSED OR HOPELESS: NOT AT ALL

## 2024-07-10 ASSESSMENT — ENCOUNTER SYMPTOMS
PAIN: 1
HEMATOLOGIC/LYMPHATIC NEGATIVE: 1
PAIN LOCATION - PAIN QUALITY: STABBING
PSYCHIATRIC NEGATIVE: 1
BACK PAIN: 1
LOWEST PAIN SEVERITY IN PAST 24 HOURS: 10/10
PAIN SEVERITY GOAL: 10/10
NEUROLOGICAL NEGATIVE: 1
ENDOCRINE NEGATIVE: 1
GASTROINTESTINAL NEGATIVE: 1
RESPIRATORY NEGATIVE: 1
PAIN LOCATION - PAIN SEVERITY: 10/10
CARDIOVASCULAR NEGATIVE: 1
PAIN LOCATION: BACK
PERSON REPORTING PAIN: PATIENT
SUBJECTIVE PAIN PROGRESSION: UNCHANGED
HIGHEST PAIN SEVERITY IN PAST 24 HOURS: 10/10
CONSTITUTIONAL NEGATIVE: 1
EYES NEGATIVE: 1
PAIN LOCATION - PAIN FREQUENCY: CONSTANT

## 2024-07-10 ASSESSMENT — ACTIVITIES OF DAILY LIVING (ADL)
BATHING ASSESSED: 1
GROOMING_CURRENT_FUNCTION: INDEPENDENT
AMBULATION ASSISTANCE: INDEPENDENT
AMBULATION ASSISTANCE: 1
CURRENT_FUNCTION: INDEPENDENT
BATHING_CURRENT_FUNCTION: INDEPENDENT
PHYSICAL TRANSFERS ASSESSED: 1
DRESSING_UB_CURRENT_FUNCTION: INDEPENDENT
DRESSING_LB_CURRENT_FUNCTION: INDEPENDENT
GROOMING ASSESSED: 1

## 2024-07-10 ASSESSMENT — LIFESTYLE VARIABLES: TOTAL SCORE: 1

## 2024-07-10 ASSESSMENT — PAIN SCALES - PAIN ASSESSMENT IN ADVANCED DEMENTIA (PAINAD)
NEGVOCALIZATION: 0
BREATHING: 0
BODYLANGUAGE: 0 - RELAXED.
FACIALEXPRESSION: 0
CONSOLABILITY: 0
CONSOLABILITY: 0 - NO NEED TO CONSOLE.
NEGVOCALIZATION: 0 - NONE.
FACIALEXPRESSION: 0 - SMILING OR INEXPRESSIVE.
TOTALSCORE: 0
BODYLANGUAGE: 0

## 2024-07-10 ASSESSMENT — PAIN SCALES - GENERAL: PAINLEVEL: 10-WORST PAIN EVER

## 2024-07-10 ASSESSMENT — PAIN - FUNCTIONAL ASSESSMENT: PAIN_FUNCTIONAL_ASSESSMENT: 0-10

## 2024-07-10 ASSESSMENT — PAIN DESCRIPTION - DESCRIPTORS: DESCRIPTORS: THROBBING

## 2024-07-10 NOTE — CASE COMMUNICATION
OT EV complete. Pt reports she saw pain management, who increased her norco to BID, and pt will be undergoing kyphoplasty. no further OT warrented at this time.

## 2024-07-10 NOTE — PROGRESS NOTES
PAIN MANAGEMENT NEW PATIENT OFFICE NOTE    Date of Service: 7/10/2024    SUBJECTIVE    CHIEF COMPLAINT: LBP    HISTORY OF PRESENT ILLNESS    Stephanie Jimenez is a 88 y.o. female with PMH HTN, GERD, PAF, dementia, SCC s/p Mohs who presents as new patient referred by Henri Ordaz MD with LB pain. Pt is a poor historian and her daughter is present to assist with hx.     Pt describes LBP for 3 y that acutely worsened 1 mo ago without inciting trauma/incident.  Pt has tried Tylenol, NSAIDs, tramadol, tizanidine, heating pad, >2 w PT, lido patch, Norco without sustained relief.     Pt denies new-onset numbness, weakness, bowel/bladder incontinence.  Pt denies recent infection, allergy to Latex/iodine/contrast. Patient is currently taking the following blood thinner(s): N/A    REVIEW OF SYSTEMS  Review of Systems   Constitutional: Negative.    HENT: Negative.     Eyes: Negative.    Respiratory: Negative.     Cardiovascular: Negative.    Gastrointestinal: Negative.    Endocrine: Negative.    Musculoskeletal:  Positive for back pain.   Skin: Negative.    Neurological: Negative.    Hematological: Negative.    Psychiatric/Behavioral: Negative.         PAST MEDICAL HISTORY  Past Medical History:   Diagnosis Date    Actinic keratosis of scalp     Anxiety state     Asthma ([facility]     Dr. Chau    Cystocele, unspecified     Gastritis     Hemorrhoids     Hiatal hernia     Hypercholesterolemia     Hypertension     Osteoporosis     halo fracture right humerus    Prediabetes     Sciatica      Past Surgical History:   Procedure Laterality Date    BIOPSY  2013    lip, noncancerous    BIOPSY Left 03/2021    left leg - apex derm    BIOPSY  03/2021    forehead - apex derm    BLADDER SUSPENSION  1979    COLONOSCOPY  2002    COLONOSCOPY  11/2015    CYSTOSCOPY  2004    with suburethral sling    ESOPHAGOGASTRODUODENOSCOPY      HIP SURGERY Right 06/2019    right hip/femur    TOTAL ABDOMINAL HYSTERECTOMY W/ BILATERAL SALPINGOOPHORECTOMY   1977    URETHRAL SLING  2004    suburethral sling and cystoscopy     Family History   Problem Relation Name Age of Onset    Heart disease Mother      Lung cancer Father      Other (mouth cancer) Sister      Lung cancer Sister      Heart attack Sister      No Known Problems Sister      Lung cancer Brother      No Known Problems Daughter      No Known Problems Son         CURRENT MEDICATIONS  Current Outpatient Medications   Medication Sig Dispense Refill    acetaminophen (Tylenol 8 HOUR) 650 mg ER tablet Take 2 tablets (1,300 mg) by mouth every 8 hours if needed for mild pain (1 - 3), moderate pain (4 - 6) or headaches.      ALPRAZolam (Xanax) 0.25 mg tablet Take 1 tablet (0.25 mg) by mouth once daily as needed for anxiety. 30 tablet 5    amLODIPine (Norvasc) 10 mg tablet Take 1 tablet (10 mg) by mouth once daily.      atorvastatin (Lipitor) 10 mg tablet Take 1 tablet (10 mg) by mouth once daily.      calcium carbonate-vitamin D3 (Os-Jory 500 + D3) 500 mg-5 mcg (200 unit) tablet Take 1 tablet by mouth once daily.      folic acid/multivit-min/lutein (CENTRUM SILVER ORAL) Take 1 tablet by mouth once daily.      lidocaine (Lidoderm) 5 % patch Place 1 patch on the skin once daily. Remove after 12 hours      metoprolol succinate XL (Toprol-XL) 50 mg 24 hr tablet take 1 tablet by mouth once daily 30 tablet 5    mirtazapine (Remeron) 15 mg tablet TAKE ONE TABLET BY MOUTH AT BEDTIME ONCE A DAY 30 tablet 0    omeprazole (PriLOSEC) 20 mg DR capsule Take 1 capsule (20 mg) by mouth once daily in the morning. Take before meals. 30 minutes prior      tiZANidine (Zanaflex) 4 mg capsule Take 1 capsule (4 mg) by mouth 3 times a day as needed for muscle spasms (pain) for up to 20 days. 30 capsule 1     No current facility-administered medications for this visit.       ALLERGIES AND DRUG REACTIONS  Allergies   Allergen Reactions    Montelukast Hallucinations    Morphine Nausea And Vomiting    Indapamide Headache    Methylprednisolone  "Other     Jittery      Quinapril Unknown    Triamterene-Hydrochlorothiazid Headache          OBJECTIVE  Visit Vitals  /84   Pulse 82   Resp 18   Ht 1.651 m (5' 5\")   Wt 60.8 kg (134 lb)   BMI 22.30 kg/m²   Smoking Status Former   BSA 1.67 m²       Last Recorded Pain Score (if available):                Physical Exam  Vitals and nursing note reviewed.     General: Sitting in chair, NAD  Head: NCAT  Eyes: Sclera/conjunctiva clear, EOMI, PERRL  Nose/mouth: MMM  CV: Good distal pulses  Lungs: Good/equal chest excursion  Abdomen: Soft, ND  Ext: No cyanosis/edema  MSK: L-spine alignment: thoracic kyphosis, BL paraspinal m TTP, thoracolumbar spine point TTP with +percussion tenderness, spine ROM: pain on extension, rotation, lateral flexion    Neuro: AAOx3   Dermatome sensation to light touch  LEFT L1 (lower pelvis/upper thigh): WNL    RIGHT L1: WNL      LEFT L2 (upper thigh): WNL       RIGHT: L2:WNL      LEFT L3 (medial knee): WNL       RIGHT L3: WNL      LEFT L4 (superior medial malleolus): WNL       RIGHT L4: WNL      LEFT L5 (dorsal foot): WNL       RIGHT L5: WNL      LEFT S1 (lateral foot): WNL     RIGHT S1: WNL      LEFT S2 (popliteal fossa): WNL    RIGHT S2: WNL        Motor strengthLEFT L2 (hip flexion): 5/5   RIGHT L2: 5/5  LEFT L3 (knee extension): 5/5     RIGHT L3: 5/5  LEFT L4 (dorsiflexion): 5/5     RIGHT L4: 5/5  LEFT L5 (EHL extension): 5/5     RIGHT L5: 5/5  LEFT S1 (plantarflexion): 5/5     RIGHT S1: 5/5  LEFT S2 (knee flexion): 5/5      RIGHT S2: 5/5      Psych: affect nl  Skin: no rash/lesions      REVIEW OF LABORATORY DATA  I have reviewed the following lab results:  WBC   Date Value Ref Range Status   07/01/2024 9.2 4.4 - 11.3 x10*3/uL Final     RBC   Date Value Ref Range Status   07/01/2024 4.28 4.00 - 5.20 x10*6/uL Final     Hemoglobin   Date Value Ref Range Status   07/01/2024 13.0 12.0 - 16.0 g/dL Final     Hematocrit   Date Value Ref Range Status   07/01/2024 39.8 36.0 - 46.0 % Final     MCV "   Date Value Ref Range Status   07/01/2024 93 80 - 100 fL Final     MCH   Date Value Ref Range Status   07/01/2024 30.4 26.0 - 34.0 pg Final     MCHC   Date Value Ref Range Status   07/01/2024 32.7 32.0 - 36.0 g/dL Final     RDW   Date Value Ref Range Status   07/01/2024 12.4 11.5 - 14.5 % Final     Platelets   Date Value Ref Range Status   07/01/2024 367 150 - 450 x10*3/uL Final     MPV   Date Value Ref Range Status   08/21/2023 10.4 7.0 - 12.6 CU Final     Sodium   Date Value Ref Range Status   07/01/2024 132 (L) 133 - 145 mmol/L Final     Potassium   Date Value Ref Range Status   07/01/2024 4.5 3.4 - 5.1 mmol/L Final     Bicarbonate   Date Value Ref Range Status   07/01/2024 25 24 - 31 mmol/L Final     Urea Nitrogen   Date Value Ref Range Status   07/01/2024 11 8 - 25 mg/dL Final     Calcium   Date Value Ref Range Status   07/01/2024 9.2 8.5 - 10.4 mg/dL Final     Protime   Date Value Ref Range Status   07/25/2022 11.9 9.8 - 13.4 sec Final     INR   Date Value Ref Range Status   07/25/2022 1.0 0.9 - 1.1 Final         REVIEW OF RADIOLOGY   I have reviewed the following:  Radiology Studies           XR L-spine 6/26/24:  Multiple vertebral compression fractures are present and have  worsened.      The previous T12 vertebral compression fracture has shown greater  degree of worsening now with a near vertebral plana appearance.      There is a new mild-to-moderate vertebral compression fracture at T11  with an approximate 40% anterior height loss.      New mild vertebral compression fractures at L1 and progression of the  L2 vertebral compression fracture from prior study.      Increased kyphosis across the lumbar spine      IMPRESSION:  Interval development of and progression of multiple vertebral  compression fractures with increasing kyphosis. If deemed clinically  appropriate, MRI can be considered for further assessment.       ASSESSMENT & PLAN  Stephanie Jmienez is a 88 y.o. old female with PMH HTN, GERD, PAF,  dementia, SCC s/p Mohs who presents as new patient referred by Henri Ordaz MD with LB pain    1) LBP  -likely 2/2 new T11 and L1 osteoporotic VCF  -Refractive to >4 w tx including Tylenol, NSAIDs, tramadol, tizanidine, heating pad, >2 w PT, lido patch, Norco  -MRI T/L-spine to eval chronicity of new fx as well as worsening T12 and L2 fx identified on XR above  -Plan on T11, L1 kyphoplasty to target pain generator as seen on imaging and minimize risk/likelihood of chronic opioid use and/or surgery. This may also decrease her risk of becoming sedentary, developing pna, or blood clot  -In meantime, will submit PA for back brace to temporize sx and facilitate activity in context of fx and deconditioned paraspinal m        Discussed procedure risks/benefits in detail with patient. Pt meets medical necessity for procedure due to failure of conservative measures. Reviewed procedural risks including bleeding, infection, nerve damage, paralysis. Also reviewed mitigating factors such as screening for infection/blood thinner use, sterile precautions, and image-guidance when applicable. All questions answered. Pt/guardian expressed understanding and choose to proceed           Ezequiel Hernandez MD  Anesthesiologist & Interventional Pain Physician   [facility] [place]  O: [phone]  F: [phone]  10:44 AM  07/10/24

## 2024-07-10 NOTE — LETTER
July 10, 2024     Henri Ordaz MD  [address] [address]  [place] [address]    Patient: Stephanie Jimenez   YOB: 1935   Date of Visit: 7/10/2024       Dear Dr. Henri Ordaz MD:    Thank you for referring Stephanie Jimenez to me for evaluation. Below are my notes for this consultation.  If you have questions, please do not hesitate to call me. I look forward to following your patient along with you.       Sincerely,     Ezequiel Hernandez MD      CC: Laina MCCRAY David  ______________________________________________________________________________________    PAIN MANAGEMENT NEW PATIENT OFFICE NOTE    Date of Service: 7/10/2024    SUBJECTIVE    CHIEF COMPLAINT: LBP    HISTORY OF PRESENT ILLNESS    Stephanie Jimenez is a 88 y.o. female with PMH HTN, GERD, PAF, dementia, SCC s/p Mohs who presents as new patient referred by Henri Ordaz MD with LB pain. Pt is a poor historian and her daughter is present to assist with hx.     Pt describes LBP for 3 y that acutely worsened 1 mo ago without inciting trauma/incident.  Pt has tried Tylenol, NSAIDs, tramadol, tizanidine, heating pad, >2 w PT, lido patch, Norco without sustained relief.     Pt denies new-onset numbness, weakness, bowel/bladder incontinence.  Pt denies recent infection, allergy to Latex/iodine/contrast. Patient is currently taking the following blood thinner(s): N/A    REVIEW OF SYSTEMS  Review of Systems   Constitutional: Negative.    HENT: Negative.     Eyes: Negative.    Respiratory: Negative.     Cardiovascular: Negative.    Gastrointestinal: Negative.    Endocrine: Negative.    Musculoskeletal:  Positive for back pain.   Skin: Negative.    Neurological: Negative.    Hematological: Negative.    Psychiatric/Behavioral: Negative.         PAST MEDICAL HISTORY  Past Medical History:   Diagnosis Date   • Actinic keratosis of scalp    • Anxiety state    • Asthma ([facility])     Dr. Chau   • Cystocele, unspecified    • Gastritis    •  Hemorrhoids    • Hiatal hernia    • Hypercholesterolemia    • Hypertension    • Osteoporosis     halo fracture right humerus   • Prediabetes    • Sciatica      Past Surgical History:   Procedure Laterality Date   • BIOPSY  2013    lip, noncancerous   • BIOPSY Left 03/2021    left leg - apex derm   • BIOPSY  03/2021    forehead - apex derm   • BLADDER SUSPENSION  1979   • COLONOSCOPY  2002   • COLONOSCOPY  11/2015   • CYSTOSCOPY  2004    with suburethral sling   • ESOPHAGOGASTRODUODENOSCOPY     • HIP SURGERY Right 06/2019    right hip/femur   • TOTAL ABDOMINAL HYSTERECTOMY W/ BILATERAL SALPINGOOPHORECTOMY  1977   • URETHRAL SLING  2004    suburethral sling and cystoscopy     Family History   Problem Relation Name Age of Onset   • Heart disease Mother     • Lung cancer Father     • Other (mouth cancer) Sister     • Lung cancer Sister     • Heart attack Sister     • No Known Problems Sister     • Lung cancer Brother     • No Known Problems Daughter     • No Known Problems Son         CURRENT MEDICATIONS  Current Outpatient Medications   Medication Sig Dispense Refill   • acetaminophen (Tylenol 8 HOUR) 650 mg ER tablet Take 2 tablets (1,300 mg) by mouth every 8 hours if needed for mild pain (1 - 3), moderate pain (4 - 6) or headaches.     • ALPRAZolam (Xanax) 0.25 mg tablet Take 1 tablet (0.25 mg) by mouth once daily as needed for anxiety. 30 tablet 5   • amLODIPine (Norvasc) 10 mg tablet Take 1 tablet (10 mg) by mouth once daily.     • atorvastatin (Lipitor) 10 mg tablet Take 1 tablet (10 mg) by mouth once daily.     • calcium carbonate-vitamin D3 (Os-Jory 500 + D3) 500 mg-5 mcg (200 unit) tablet Take 1 tablet by mouth once daily.     • folic acid/multivit-min/lutein (CENTRUM SILVER ORAL) Take 1 tablet by mouth once daily.     • lidocaine (Lidoderm) 5 % patch Place 1 patch on the skin once daily. Remove after 12 hours     • metoprolol succinate XL (Toprol-XL) 50 mg 24 hr tablet take 1 tablet by mouth once daily 30  "tablet 5   • mirtazapine (Remeron) 15 mg tablet TAKE ONE TABLET BY MOUTH AT BEDTIME ONCE A DAY 30 tablet 0   • omeprazole (PriLOSEC) 20 mg DR capsule Take 1 capsule (20 mg) by mouth once daily in the morning. Take before meals. 30 minutes prior     • tiZANidine (Zanaflex) 4 mg capsule Take 1 capsule (4 mg) by mouth 3 times a day as needed for muscle spasms (pain) for up to 20 days. 30 capsule 1     No current facility-administered medications for this visit.       ALLERGIES AND DRUG REACTIONS  Allergies   Allergen Reactions   • Montelukast Hallucinations   • Morphine Nausea And Vomiting   • Indapamide Headache   • Methylprednisolone Other     Jittery     • Quinapril Unknown   • Triamterene-Hydrochlorothiazid Headache          OBJECTIVE  Visit Vitals  /84   Pulse 82   Resp 18   Ht 1.651 m (5' 5\")   Wt 60.8 kg (134 lb)   BMI 22.30 kg/m²   Smoking Status Former   BSA 1.67 m²       Last Recorded Pain Score (if available):                Physical Exam  Vitals and nursing note reviewed.     General: Sitting in chair, NAD  Head: NCAT  Eyes: Sclera/conjunctiva clear, EOMI, PERRL  Nose/mouth: MMM  CV: Good distal pulses  Lungs: Good/equal chest excursion  Abdomen: Soft, ND  Ext: No cyanosis/edema  MSK: L-spine alignment: thoracic kyphosis, BL paraspinal m TTP, thoracolumbar spine point TTP with +percussion tenderness, spine ROM: pain on extension, rotation, lateral flexion    Neuro: AAOx3   Dermatome sensation to light touch  LEFT L1 (lower pelvis/upper thigh): WNL    RIGHT L1: WNL      LEFT L2 (upper thigh): WNL       RIGHT: L2:WNL      LEFT L3 (medial knee): WNL       RIGHT L3: WNL      LEFT L4 (superior medial malleolus): WNL       RIGHT L4: WNL      LEFT L5 (dorsal foot): WNL       RIGHT L5: WNL      LEFT S1 (lateral foot): WNL     RIGHT S1: WNL      LEFT S2 (popliteal fossa): WNL    RIGHT S2: WNL        Motor strengthLEFT L2 (hip flexion): 5/5   RIGHT L2: 5/5  LEFT L3 (knee extension): 5/5     RIGHT L3: 5/5  LEFT " L4 (dorsiflexion): 5/5     RIGHT L4: 5/5  LEFT L5 (EHL extension): 5/5     RIGHT L5: 5/5  LEFT S1 (plantarflexion): 5/5     RIGHT S1: 5/5  LEFT S2 (knee flexion): 5/5      RIGHT S2: 5/5      Psych: affect nl  Skin: no rash/lesions      REVIEW OF LABORATORY DATA  I have reviewed the following lab results:  WBC   Date Value Ref Range Status   07/01/2024 9.2 4.4 - 11.3 x10*3/uL Final     RBC   Date Value Ref Range Status   07/01/2024 4.28 4.00 - 5.20 x10*6/uL Final     Hemoglobin   Date Value Ref Range Status   07/01/2024 13.0 12.0 - 16.0 g/dL Final     Hematocrit   Date Value Ref Range Status   07/01/2024 39.8 36.0 - 46.0 % Final     MCV   Date Value Ref Range Status   07/01/2024 93 80 - 100 fL Final     MCH   Date Value Ref Range Status   07/01/2024 30.4 26.0 - 34.0 pg Final     MCHC   Date Value Ref Range Status   07/01/2024 32.7 32.0 - 36.0 g/dL Final     RDW   Date Value Ref Range Status   07/01/2024 12.4 11.5 - 14.5 % Final     Platelets   Date Value Ref Range Status   07/01/2024 367 150 - 450 x10*3/uL Final     MPV   Date Value Ref Range Status   08/21/2023 10.4 7.0 - 12.6 CU Final     Sodium   Date Value Ref Range Status   07/01/2024 132 (L) 133 - 145 mmol/L Final     Potassium   Date Value Ref Range Status   07/01/2024 4.5 3.4 - 5.1 mmol/L Final     Bicarbonate   Date Value Ref Range Status   07/01/2024 25 24 - 31 mmol/L Final     Urea Nitrogen   Date Value Ref Range Status   07/01/2024 11 8 - 25 mg/dL Final     Calcium   Date Value Ref Range Status   07/01/2024 9.2 8.5 - 10.4 mg/dL Final     Protime   Date Value Ref Range Status   07/25/2022 11.9 9.8 - 13.4 sec Final     INR   Date Value Ref Range Status   07/25/2022 1.0 0.9 - 1.1 Final         REVIEW OF RADIOLOGY   I have reviewed the following:  Radiology Studies           XR L-spine 6/26/24:  Multiple vertebral compression fractures are present and have  worsened.      The previous T12 vertebral compression fracture has shown greater  degree of worsening  now with a near vertebral plana appearance.      There is a new mild-to-moderate vertebral compression fracture at T11  with an approximate 40% anterior height loss.      New mild vertebral compression fractures at L1 and progression of the  L2 vertebral compression fracture from prior study.      Increased kyphosis across the lumbar spine      IMPRESSION:  Interval development of and progression of multiple vertebral  compression fractures with increasing kyphosis. If deemed clinically  appropriate, MRI can be considered for further assessment.       ASSESSMENT & PLAN  Stephanie Jimenez is a 88 y.o. old female with PMH HTN, GERD, PAF, dementia, SCC s/p Mohs who presents as new patient referred by Henri Ordaz MD with LB pain    1) LBP  -likely 2/2 new T11 and L1 osteoporotic VCF  -Refractive to >4 w tx including Tylenol, NSAIDs, tramadol, tizanidine, heating pad, >2 w PT, lido patch, Norco  -MRI T/L-spine to eval chronicity of new fx as well as worsening T12 and L2 fx identified on XR above  -Plan on T11, L1 kyphoplasty to target pain generator as seen on imaging and minimize risk/likelihood of chronic opioid use and/or surgery. This may also decrease her risk of becoming sedentary, developing pna, or blood clot  -In meantime, will submit PA for back brace to temporize sx and facilitate activity in context of fx and deconditioned paraspinal m        Discussed procedure risks/benefits in detail with patient. Pt meets medical necessity for procedure due to failure of conservative measures. Reviewed procedural risks including bleeding, infection, nerve damage, paralysis. Also reviewed mitigating factors such as screening for infection/blood thinner use, sterile precautions, and image-guidance when applicable. All questions answered. Pt/guardian expressed understanding and choose to proceed           Ezequiel Hernandez MD  Anesthesiologist & Interventional Pain Physician   [facility] [place]  O:  [phone]  F: [phone]  10:44 AM  07/10/24

## 2024-07-11 ENCOUNTER — HOSPITAL ENCOUNTER (OUTPATIENT)
Dept: RADIOLOGY | Facility: CLINIC | Age: 89
Discharge: HOME | End: 2024-07-11
Payer: MEDICARE

## 2024-07-11 ENCOUNTER — TELEPHONE (OUTPATIENT)
Dept: PAIN MEDICINE | Facility: CLINIC | Age: 89
End: 2024-07-11
Payer: MEDICARE

## 2024-07-11 ENCOUNTER — HOME CARE VISIT (OUTPATIENT)
Dept: HOME HEALTH SERVICES | Facility: HOME HEALTH | Age: 89
End: 2024-07-11
Payer: MEDICARE

## 2024-07-11 DIAGNOSIS — M80.08XA AGE-RELATED OSTEOPOROSIS WITH CURRENT PATHOLOGICAL FRACTURE OF VERTEBRA, INITIAL ENCOUNTER (MULTI): ICD-10-CM

## 2024-07-11 DIAGNOSIS — M54.50 LOW BACK PAIN, UNSPECIFIED BACK PAIN LATERALITY, UNSPECIFIED CHRONICITY, UNSPECIFIED WHETHER SCIATICA PRESENT: ICD-10-CM

## 2024-07-11 PROCEDURE — 72148 MRI LUMBAR SPINE W/O DYE: CPT

## 2024-07-11 PROCEDURE — 72146 MRI CHEST SPINE W/O DYE: CPT

## 2024-07-11 PROCEDURE — G0151 HHCP-SERV OF PT,EA 15 MIN: HCPCS

## 2024-07-11 SDOH — HEALTH STABILITY: PHYSICAL HEALTH: PHYSICAL EXERCISE: 10

## 2024-07-11 SDOH — HEALTH STABILITY: PHYSICAL HEALTH: EXERCISE ACTIVITY: APS, MARCHING, LAQ, HIP ABD/ADD

## 2024-07-11 SDOH — HEALTH STABILITY: PHYSICAL HEALTH

## 2024-07-11 SDOH — HEALTH STABILITY: PHYSICAL HEALTH: EXERCISE ACTIVITIES SETS: 1

## 2024-07-11 SDOH — HEALTH STABILITY: PHYSICAL HEALTH: PHYSICAL EXERCISE: SITTING

## 2024-07-11 ASSESSMENT — ENCOUNTER SYMPTOMS
PAIN LOCATION: BACK
PAIN LOCATION - PAIN QUALITY: ACHING
LOWEST PAIN SEVERITY IN PAST 24 HOURS: 4/10
PAIN: 1
PERSON REPORTING PAIN: PATIENT
PAIN LOCATION - RELIEVING FACTORS: REST, MEDS
HIGHEST PAIN SEVERITY IN PAST 24 HOURS: 6/10
PAIN LOCATION - PAIN SEVERITY: 6/10
SUBJECTIVE PAIN PROGRESSION: UNCHANGED

## 2024-07-11 NOTE — TELEPHONE ENCOUNTER
I spoke with Marvin  daughter this afternoon reguarding preop questions.  What to wear, will Stephanie be cohearent when they leave [facility]? What time should she be there?What meds should she take?  She did not have an questions about the actual surgery or what to expect.

## 2024-07-12 ENCOUNTER — HOME CARE VISIT (OUTPATIENT)
Dept: HOME HEALTH SERVICES | Facility: HOME HEALTH | Age: 89
End: 2024-07-12
Payer: MEDICARE

## 2024-07-16 ENCOUNTER — ANCILLARY PROCEDURE (OUTPATIENT)
Dept: RADIOLOGY | Facility: EXTERNAL LOCATION | Age: 89
End: 2024-07-16
Payer: MEDICARE

## 2024-07-16 DIAGNOSIS — M80.08XA AGE-RELATED OSTEOPOROSIS WITH CURRENT PATHOLOGICAL FRACTURE, VERTEBRA(E), INITIAL ENCOUNTER FOR FRACTURE (MULTI): ICD-10-CM

## 2024-07-16 PROCEDURE — 22513 PERQ VERTEBRAL AUGMENTATION: CPT | Performed by: ANESTHESIOLOGY

## 2024-07-16 NOTE — PROGRESS NOTES
OPERATIVE NOTE  Preprocedure diagnosis: T11 osteoporotic compression fracture  Postprocedure diagnosis T11 osteoporotic compression fracture    Procedure performed: T11 kyphoplasty  Physician: Ezequiel Hernandez MD  Anesthesia: MAC  Complications: none  Blood loss:  Nil    Clinical note: This is a very pleasant 88 y.o. female with PMH HTN, GERD, PAF, dementia, SCC s/p Mohs who suffers with LBP here meeting all medical criteria for above-mentioned procedure. Patient's pain stable and persistent from last visit.  Appropriately NPO.  No personal/family hx issues with anesthesia. Denies allergies to Latex, steroids, local anesthetics, or iodine/contrast. Denies being on blood thinners. Not diabetic.  Denies fever, chills, NS, CP, SOB, cough, N/V.    Discussed procedure risks/benefits in detail with patient. Pt meets medical necessity for procedure due to failure of conservative measures. Reviewed procedural risks including bleeding, infection, nerve damage, paralysis. Also reviewed mitigating factors such as screening for infection/blood thinner use, sterile precautions, and image-guidance when applicable. All questions answered. Pt/guardian expressed understanding and choose to proceed.     Discussed that instead of targeting both T11 and L1 as initially considering, will only perform T11 kypho as L1 shows no STIR signal on MRI, indicating healed/sclerotic (risk may outweigh benefit). Informed consent obtained from patient/daughter.      Procedure:    VERTEBRAL AUGMENTATION PROCEDURE NOTE:   T11 Kyphoplasty under fluoroscopic guidance    PREPROCEDURAL DIAGNOSES: T11 Osteoporotic compression fracture  POSTPROCEDURAL DIAGNOSES: T11 Osteoporotic compression fracture    PROCEDURALIST: Ezequiel Hernandez MD  ANESTHESIA: MAC  LOCATION[facility]     INDICATION FOR PROCEDURE:       Persistent back pain at site of vertebral body insufficiency fracture, intractable, failed conservative medical management including: time,  "rest, activity modification, oral analgesics. Kyphoplasty indicated to target pain generator as seen on imaging and minimize risk/likelihood of chronic opioid use and/or surgery         INFORMED CONSENT:    After reviewing the procedure with the patient, expected outcomes, potential benefits and procedural risks, informed consent to proceed with the injection was obtained.  The potential benefit of significantly decreased back pain was reviewed; however, the possibility of little to no pain relief resulting from the procedure was also discussed.  The patient was specifically made aware of risks involving spinal cord injury, nerve root injury, pedicle fracture, hematoma, pain at injection site, cement leakage, and vascular injury.     DESCRIPTION OF PROCEDURE:     Pt brought to the OR, placed in prone position, standard ASA monitors applied by anesthesia car provider. Pt was given preoperative antibiotics: 2 g cefazolin IV.      Patient's lumbar spine prepped with chloroprep and duraprep and draped in sterile fashion. The T11 vertebra was identified under fluoroscopy and the end plates squared off. Left T11 pedicle identified. Skin anesthetized with 1% lidocaine. A 3.5\" 22 G spinal needle was advanced under fluoro guidance to contact pedicle. After negative aspiration, additional 1% lidocaine was administered. Needle re-styletted and withdrawn. Then, skin puncture was made with a #11 blade.     An 11 G Jamshidi trocar with albert tip stylet was placed percutaenously here and seated into the superolateral aspect of the left T11 pedicle.  Using a small mallet, the trocar was advanced slowly with AP and lateral fluoroscopic guidance to ensure intra-pedicular access without violation of foramen or canal. Thus, the  trochar was advanced to a terminal position in the anterior middle  portion of the T11 vertebral body.  Curved stylet advanced through trocar until across midline in AP and 2/3 to anterior border of vertebral " body. Stylet removed and balloon inserted. Balloon inflated not to exceed 300 mm Hg to create cavity. Balloon then manually deflated and removed intact.   After cement was prepared using Striker kit, approximately 4 cc of cement was injected under constant live fluoroscopy showing excellent cephalo-caudad spread and interdigitation within the vertebral body. Bilateral spread was confirmed on AP views. No canal or foraminal spread of cement seen. The stylet was then replaced and the trocar was carefully removed. Steri-strips were applied to the puncture site.     The pt was transferred to the recovery room in stable condition and left supine for 30 min until the cement was cured. At no time during the procedure did the patient experience lower extremity pain or paresthesia.  The patient was neurologically intact post-op and the BLE were assessed in the recovery room.      The patient reported that back pain was improved after recovery and ambulation.  The patient was discharged home  in excellent condition with a family member.       DISPOSITION:     HOME  COMPLICATIONS: NONE      Ezequiel Hernandez MD  Anesthesiologist & Interventional Pain Physician   [facility] [place]  O: [phone]  F: [phone]  9:02 AM  07/16/24

## 2024-07-17 ENCOUNTER — TELEPHONE (OUTPATIENT)
Dept: PAIN MEDICINE | Facility: CLINIC | Age: 89
End: 2024-07-17
Payer: MEDICARE

## 2024-07-17 ENCOUNTER — HOME CARE VISIT (OUTPATIENT)
Dept: HOME HEALTH SERVICES | Facility: HOME HEALTH | Age: 89
End: 2024-07-17
Payer: MEDICARE

## 2024-07-17 NOTE — TELEPHONE ENCOUNTER
Spoke with pts daughter regarding how Stephanie is doing and she said she is doing well.  C/o pain at injection sites.  Using ice and taking tylenol.  She is up moving around.  I reminded her of her follow up appointment  next Wednesday here in the [place] Office at 11:15.

## 2024-07-19 ENCOUNTER — HOME CARE VISIT (OUTPATIENT)
Dept: HOME HEALTH SERVICES | Facility: HOME HEALTH | Age: 89
End: 2024-07-19
Payer: MEDICARE

## 2024-07-19 VITALS
TEMPERATURE: 97.2 F | OXYGEN SATURATION: 96 % | SYSTOLIC BLOOD PRESSURE: 128 MMHG | HEART RATE: 85 BPM | RESPIRATION RATE: 18 BRPM | DIASTOLIC BLOOD PRESSURE: 72 MMHG

## 2024-07-19 PROCEDURE — G0151 HHCP-SERV OF PT,EA 15 MIN: HCPCS

## 2024-07-19 SDOH — HEALTH STABILITY: PHYSICAL HEALTH: PHYSICAL EXERCISE: STANDING

## 2024-07-19 SDOH — HEALTH STABILITY: PHYSICAL HEALTH: PHYSICAL EXERCISE: 10

## 2024-07-19 SDOH — HEALTH STABILITY: PHYSICAL HEALTH: EXERCISE ACTIVITIES SETS: 1

## 2024-07-19 SDOH — HEALTH STABILITY: PHYSICAL HEALTH

## 2024-07-19 SDOH — HEALTH STABILITY: PHYSICAL HEALTH: EXERCISE ACTIVITY: CALF RAISES, HIP FLEX/EXT/ABD, HS CURL, MARCHING

## 2024-07-19 SDOH — HEALTH STABILITY: PHYSICAL HEALTH: PHYSICAL EXERCISE: SITTING

## 2024-07-19 SDOH — HEALTH STABILITY: PHYSICAL HEALTH: EXERCISE ACTIVITY: APS, MARCHING, LAQ, HIP ABD/ADD

## 2024-07-19 ASSESSMENT — ENCOUNTER SYMPTOMS
SUBJECTIVE PAIN PROGRESSION: UNCHANGED
PAIN LOCATION: BACK
PAIN: 1
LOWEST PAIN SEVERITY IN PAST 24 HOURS: 6/10
PERSON REPORTING PAIN: PATIENT
PAIN LOCATION - PAIN SEVERITY: 6/10
PAIN LOCATION - RELIEVING FACTORS: REST, ICE, MEDS
PAIN LOCATION - PAIN QUALITY: ACHING
HIGHEST PAIN SEVERITY IN PAST 24 HOURS: 7/10

## 2024-07-24 ENCOUNTER — OFFICE VISIT (OUTPATIENT)
Dept: PAIN MEDICINE | Facility: CLINIC | Age: 89
End: 2024-07-24
Payer: MEDICARE

## 2024-07-24 ENCOUNTER — HOME CARE VISIT (OUTPATIENT)
Dept: HOME HEALTH SERVICES | Facility: HOME HEALTH | Age: 89
End: 2024-07-24
Payer: MEDICARE

## 2024-07-24 VITALS
DIASTOLIC BLOOD PRESSURE: 66 MMHG | WEIGHT: 134 LBS | SYSTOLIC BLOOD PRESSURE: 134 MMHG | HEIGHT: 65 IN | HEART RATE: 63 BPM | OXYGEN SATURATION: 97 % | BODY MASS INDEX: 22.33 KG/M2

## 2024-07-24 VITALS
SYSTOLIC BLOOD PRESSURE: 120 MMHG | HEART RATE: 76 BPM | TEMPERATURE: 97.6 F | DIASTOLIC BLOOD PRESSURE: 60 MMHG | RESPIRATION RATE: 18 BRPM | OXYGEN SATURATION: 96 %

## 2024-07-24 DIAGNOSIS — K59.03 DRUG INDUCED CONSTIPATION: Primary | ICD-10-CM

## 2024-07-24 DIAGNOSIS — S32.050A COMPRESSION FRACTURE OF L5 VERTEBRA, INITIAL ENCOUNTER (MULTI): ICD-10-CM

## 2024-07-24 DIAGNOSIS — Z79.891 OPIATE ANALGESIC CONTRACT EXISTS: ICD-10-CM

## 2024-07-24 PROCEDURE — 3078F DIAST BP <80 MM HG: CPT | Performed by: NURSE PRACTITIONER

## 2024-07-24 PROCEDURE — 1159F MED LIST DOCD IN RCRD: CPT | Performed by: NURSE PRACTITIONER

## 2024-07-24 PROCEDURE — 1125F AMNT PAIN NOTED PAIN PRSNT: CPT | Performed by: NURSE PRACTITIONER

## 2024-07-24 PROCEDURE — 1036F TOBACCO NON-USER: CPT | Performed by: NURSE PRACTITIONER

## 2024-07-24 PROCEDURE — 1123F ACP DISCUSS/DSCN MKR DOCD: CPT | Performed by: NURSE PRACTITIONER

## 2024-07-24 PROCEDURE — 3075F SYST BP GE 130 - 139MM HG: CPT | Performed by: NURSE PRACTITIONER

## 2024-07-24 PROCEDURE — 99024 POSTOP FOLLOW-UP VISIT: CPT | Performed by: NURSE PRACTITIONER

## 2024-07-24 PROCEDURE — G0151 HHCP-SERV OF PT,EA 15 MIN: HCPCS

## 2024-07-24 PROCEDURE — 99214 OFFICE O/P EST MOD 30 MIN: CPT | Performed by: NURSE PRACTITIONER

## 2024-07-24 RX ORDER — HYDROCODONE BITARTRATE AND ACETAMINOPHEN 5; 325 MG/1; MG/1
1 TABLET ORAL EVERY 6 HOURS PRN
Qty: 120 TABLET | Refills: 0 | Status: SHIPPED | OUTPATIENT
Start: 2024-07-24 | End: 2024-08-23

## 2024-07-24 SDOH — HEALTH STABILITY: PHYSICAL HEALTH: EXERCISE ACTIVITY: APS, MARCHING, LAQ, HIP ABD/ADD

## 2024-07-24 SDOH — HEALTH STABILITY: PHYSICAL HEALTH

## 2024-07-24 SDOH — HEALTH STABILITY: PHYSICAL HEALTH: EXERCISE ACTIVITIES SETS: 1

## 2024-07-24 SDOH — HEALTH STABILITY: PHYSICAL HEALTH: PHYSICAL EXERCISE: 10

## 2024-07-24 SDOH — HEALTH STABILITY: PHYSICAL HEALTH: PHYSICAL EXERCISE: SITTING

## 2024-07-24 ASSESSMENT — PAIN DESCRIPTION - DESCRIPTORS: DESCRIPTORS: ACHING

## 2024-07-24 ASSESSMENT — ENCOUNTER SYMPTOMS
PAIN LOCATION - PAIN SEVERITY: 5/10
PAIN: 1
LOWEST PAIN SEVERITY IN PAST 24 HOURS: 5/10
PAIN LOCATION: BACK
PAIN LOCATION - PAIN QUALITY: ACHING
HIGHEST PAIN SEVERITY IN PAST 24 HOURS: 8/10
PERSON REPORTING PAIN: PATIENT

## 2024-07-24 ASSESSMENT — PAIN SCALES - GENERAL
PAINLEVEL_OUTOF10: 9
PAINLEVEL: 9

## 2024-07-24 ASSESSMENT — PATIENT HEALTH QUESTIONNAIRE - PHQ9
2. FEELING DOWN, DEPRESSED OR HOPELESS: NOT AT ALL
1. LITTLE INTEREST OR PLEASURE IN DOING THINGS: NOT AT ALL
SUM OF ALL RESPONSES TO PHQ9 QUESTIONS 1 AND 2: 0

## 2024-07-24 ASSESSMENT — PAIN - FUNCTIONAL ASSESSMENT: PAIN_FUNCTIONAL_ASSESSMENT: 0-10

## 2024-07-25 ENCOUNTER — TELEPHONE (OUTPATIENT)
Dept: PAIN MEDICINE | Facility: CLINIC | Age: 89
End: 2024-07-25
Payer: MEDICARE

## 2024-07-25 NOTE — PROGRESS NOTES
Subjective   Patient ID: Stephanie Jimenez is a 88 y.o. female who presents for Follow-up (Post OP S/P KYPHOPLASTY).    HPI 88-year-old female with age-related osteoporosis with current pathological vertebral fractures presents for a follow-up to her T11 kyphoplasty with Dr. Hernandez on 7/16/2024.  She presents with her daughter.  She reports approximately 10% improvement in pain post-procedure.  Immediately after the procedure she was standing straight and was having a decent amount of relief.  She reports that her pain has since returned to baseline and she continues to have severe, debilitating back pain.  She would like to discuss alternative treatment therapies.  Of note, she does report in the past having tried more conservative therapies such as medication management including NSAIDs, Tramadol and Hydrocodone-Acetaminophen.  She does report that she had a good response to the Norco however she did not take them regularly due to fear of becoming constipated.  She does reside at an assisted living facility that would be able to manage the administration of her opioid medications.    Review of Systems Unless noted in the HPI all other systems have been reviewed and are negative for complaint.     Objective   Physical Exam  General- No acute distress, well appearing and well nourished.   Eyes Conjunctiva and lids: No erythema, swelling or discharge  Neck - Supple, no cervical lymphadenopathy.   Pulmonary - Respiratory effort: Normal respiration.   Cardiovascular - Normal rate and rhythm.  Examination of extremities for edema and/or varicosities: No peripheral edema  Abdomen: Soft, Non-tender, non-distended, no abdominal masses.   Musculoskeletal - Range of motion: decreased ROM to the thoracic and lumbar spines. Decreased ROM to the BLE.   Skin - Skin and subcutaneous tissue: one small puncture site with steri-strip. CDI. No s/sx of infection.   Neurologic - Reflexes: Normal. Coordination: Antalgic gait; use of  cane for ambulation.    Psychiatric - Orientation to person, place, and time: Normal. Mood and affect: Normal.  Assessment & Plan  Compression fracture of L5 vertebra, initial encounter (Multi)    Orders:    HYDROcodone-acetaminophen (Norco) 5-325 mg tablet; Take 1 tablet by mouth every 6 hours if needed for severe pain (7 - 10).    Drug induced constipation    Orders:    docusate sodium (Colace) 50 mg capsule; Take 1 capsule (50 mg) by mouth once daily.    Opiate analgesic contract exists    Orders:    Oral drug test; [facility]; 013221 - Miscellaneous Test    TREATMENT PLAN:  I had a nice discussion with the patient today and our plan will be as follows:  Radiology: No new imaging to review at this time.   Physically: Encouraged patient to continue with increased physical activity as able.   Psychologically: No acute psychological needs at this time.    Medication: After discussing with my collaborating physician, Dr. Hernandez, the patient was presented with a controlled substance agreement.  The patient had the opportunity to read through the agreement during the office visit and has signed the agreement today.  A copy of the agreement was offered to the patient to take home for their records.  A specimen for toxicology screening was obtained.  The patient was started on Norco 5/325 mg 4 times daily as needed for pain.  The patient resides in an assisted living facility so the administration of this medication will be managed by the facility.  Due to this, she will be unable to provide the medication at her follow-up visits for pill counts.  Duration: Chronic/ongoing.  Intervention: Patient is status post T11 kyphoplasty secondary to osteoporotic compression fractures.  She is healing well from her surgical standpoint.  She continues to have severe, debilitating back pain.  At this time she would like to try medication/symptom management.  We will follow-up in 1 month or sooner as needed.

## 2024-07-25 NOTE — TELEPHONE ENCOUNTER
Phone call from [facility] to clarify [place] order and see if Tramadol order is still active also. Confirmed that it was discontinued.

## 2024-07-26 ENCOUNTER — HOME CARE VISIT (OUTPATIENT)
Dept: HOME HEALTH SERVICES | Facility: HOME HEALTH | Age: 89
End: 2024-07-26
Payer: MEDICARE

## 2024-07-26 VITALS
HEART RATE: 82 BPM | RESPIRATION RATE: 18 BRPM | OXYGEN SATURATION: 98 % | TEMPERATURE: 97.4 F | SYSTOLIC BLOOD PRESSURE: 134 MMHG | DIASTOLIC BLOOD PRESSURE: 68 MMHG

## 2024-07-26 DIAGNOSIS — G89.29 CHRONIC BILATERAL LOW BACK PAIN, UNSPECIFIED WHETHER SCIATICA PRESENT: Primary | ICD-10-CM

## 2024-07-26 DIAGNOSIS — M54.50 CHRONIC BILATERAL LOW BACK PAIN, UNSPECIFIED WHETHER SCIATICA PRESENT: Primary | ICD-10-CM

## 2024-07-26 PROCEDURE — G0151 HHCP-SERV OF PT,EA 15 MIN: HCPCS

## 2024-07-26 ASSESSMENT — ACTIVITIES OF DAILY LIVING (ADL)
PHYSICAL TRANSFERS ASSESSED: 1
AMBULATION ASSISTANCE: 1
AMBULATION ASSISTANCE: INDEPENDENT
AMBULATION ASSISTANCE ON FLAT SURFACES: 1
AMBULATION_DISTANCE/DURATION_TOLERATED: 150+ FEET
HOME_HEALTH_OASIS: 00
CURRENT_FUNCTION: INDEPENDENT
OASIS_M1830: 01

## 2024-07-26 ASSESSMENT — ENCOUNTER SYMPTOMS
PAIN LOCATION - RELIEVING FACTORS: REST, SITTING
LOWEST PAIN SEVERITY IN PAST 24 HOURS: 5/10
PAIN LOCATION - PAIN SEVERITY: 5/10
HIGHEST PAIN SEVERITY IN PAST 24 HOURS: 6/10
PAIN LOCATION - PAIN QUALITY: ACHING
SUBJECTIVE PAIN PROGRESSION: UNCHANGED
PAIN LOCATION: BACK
PERSON REPORTING PAIN: PATIENT
PAIN: 1

## 2024-08-03 LAB — SCAN RESULT: NORMAL

## 2024-08-21 ENCOUNTER — OFFICE VISIT (OUTPATIENT)
Dept: PAIN MEDICINE | Facility: CLINIC | Age: 89
End: 2024-08-21
Payer: MEDICARE

## 2024-08-21 VITALS
HEART RATE: 77 BPM | BODY MASS INDEX: 22.33 KG/M2 | DIASTOLIC BLOOD PRESSURE: 72 MMHG | SYSTOLIC BLOOD PRESSURE: 131 MMHG | HEIGHT: 65 IN | RESPIRATION RATE: 18 BRPM | WEIGHT: 134 LBS

## 2024-08-21 DIAGNOSIS — G89.29 CHRONIC BILATERAL LOW BACK PAIN WITHOUT SCIATICA: Primary | ICD-10-CM

## 2024-08-21 DIAGNOSIS — M46.08 SPINAL ENTHESOPATHY, SACRAL AND SACROCOCCYGEAL REGION (CMS-HCC): ICD-10-CM

## 2024-08-21 DIAGNOSIS — M79.18 DIFFUSE MYOFASCIAL PAIN SYNDROME: ICD-10-CM

## 2024-08-21 DIAGNOSIS — M80.08XS AGE-RELATED OSTEOPOROSIS WITH CURRENT PATHOLOGICAL FRACTURE OF VERTEBRA, SEQUELA: ICD-10-CM

## 2024-08-21 DIAGNOSIS — M54.50 CHRONIC BILATERAL LOW BACK PAIN WITHOUT SCIATICA: Primary | ICD-10-CM

## 2024-08-21 PROCEDURE — 1125F AMNT PAIN NOTED PAIN PRSNT: CPT | Performed by: ANESTHESIOLOGY

## 2024-08-21 PROCEDURE — 99214 OFFICE O/P EST MOD 30 MIN: CPT | Performed by: ANESTHESIOLOGY

## 2024-08-21 PROCEDURE — 3075F SYST BP GE 130 - 139MM HG: CPT | Performed by: ANESTHESIOLOGY

## 2024-08-21 PROCEDURE — 1160F RVW MEDS BY RX/DR IN RCRD: CPT | Performed by: ANESTHESIOLOGY

## 2024-08-21 PROCEDURE — 1159F MED LIST DOCD IN RCRD: CPT | Performed by: ANESTHESIOLOGY

## 2024-08-21 PROCEDURE — 1123F ACP DISCUSS/DSCN MKR DOCD: CPT | Performed by: ANESTHESIOLOGY

## 2024-08-21 PROCEDURE — 3078F DIAST BP <80 MM HG: CPT | Performed by: ANESTHESIOLOGY

## 2024-08-21 ASSESSMENT — ENCOUNTER SYMPTOMS
CONSTITUTIONAL NEGATIVE: 1
PSYCHIATRIC NEGATIVE: 1
CARDIOVASCULAR NEGATIVE: 1
ENDOCRINE NEGATIVE: 1
GASTROINTESTINAL NEGATIVE: 1
EYES NEGATIVE: 1
HEMATOLOGIC/LYMPHATIC NEGATIVE: 1
RESPIRATORY NEGATIVE: 1
BACK PAIN: 1
NEUROLOGICAL NEGATIVE: 1

## 2024-08-21 ASSESSMENT — PAIN - FUNCTIONAL ASSESSMENT: PAIN_FUNCTIONAL_ASSESSMENT: 0-10

## 2024-08-21 ASSESSMENT — PAIN DESCRIPTION - DESCRIPTORS: DESCRIPTORS: ACHING

## 2024-08-21 ASSESSMENT — PAIN SCALES - GENERAL
PAINLEVEL_OUTOF10: 8
PAINLEVEL: 8

## 2024-08-21 NOTE — H&P (VIEW-ONLY)
PAIN MANAGEMENT FOLLOW-UP OFFICE NOTE    Date of Service: 8/21/2024    SUBJECTIVE    CHIEF COMPLAINT: LBP    HISTORY OF PRESENT ILLNESS    Stephanie Jimenez is a 88 y.o. female with PMH HTN, GERD, PAF, dementia, SCC s/p Mohs who presents for F/U LBP. Pt is a poor historian and her daughter is present to assist with hx.     On 7/16, pt underwent T11 kypho with improved thoracic pain. Complains of residual left-sided LBP    Pt denies new-onset numbness, weakness, bowel/bladder incontinence.  Pt denies recent infection, allergy to Latex/iodine/contrast. Patient is currently taking the following blood thinner(s): N/A    Procedure log:  -T11 kypho 7/16/24: 100%    REVIEW OF SYSTEMS  Review of Systems   Constitutional: Negative.    HENT: Negative.     Eyes: Negative.    Respiratory: Negative.     Cardiovascular: Negative.    Gastrointestinal: Negative.    Endocrine: Negative.    Musculoskeletal:  Positive for back pain.   Skin: Negative.    Neurological: Negative.    Hematological: Negative.    Psychiatric/Behavioral: Negative.         PAST MEDICAL HISTORY  Past Medical History:   Diagnosis Date    Actinic keratosis of scalp     Anxiety state     Asthma ([facility]     Dr. Chau    Cystocele, unspecified     Gastritis     Hemorrhoids     Hiatal hernia     Hypercholesterolemia     Hypertension     Osteoporosis     halo fracture right humerus    Prediabetes     Sciatica      Past Surgical History:   Procedure Laterality Date    BIOPSY  2013    lip, noncancerous    BIOPSY Left 03/2021    left leg - apex derm    BIOPSY  03/2021    forehead - apex derm    BLADDER SUSPENSION  1979    COLONOSCOPY  2002    COLONOSCOPY  11/2015    CYSTOSCOPY  2004    with suburethral sling    ESOPHAGOGASTRODUODENOSCOPY      HIP SURGERY Right 06/2019    right hip/femur    TOTAL ABDOMINAL HYSTERECTOMY W/ BILATERAL SALPINGOOPHORECTOMY  1977    URETHRAL SLING  2004    suburethral sling and cystoscopy     Family History   Problem Relation Name Age of  Onset    Heart disease Mother      Lung cancer Father      Other (mouth cancer) Sister      Lung cancer Sister      Heart attack Sister      No Known Problems Sister      Lung cancer Brother      No Known Problems Daughter      No Known Problems Son         CURRENT MEDICATIONS  Current Outpatient Medications   Medication Sig Dispense Refill    acetaminophen (Tylenol 8 HOUR) 650 mg ER tablet Take 2 tablets (1,300 mg) by mouth every 8 hours if needed for mild pain (1 - 3), moderate pain (4 - 6) or headaches.      ALPRAZolam (Xanax) 0.25 mg tablet Take 1 tablet (0.25 mg) by mouth once daily as needed for anxiety. 30 tablet 5    amLODIPine (Norvasc) 10 mg tablet Take 1 tablet (10 mg) by mouth once daily.      atorvastatin (Lipitor) 10 mg tablet Take 1 tablet (10 mg) by mouth once daily.      calcium carbonate-vitamin D3 (Os-Jory 500 + D3) 500 mg-5 mcg (200 unit) tablet Take 1 tablet by mouth once daily.      docusate sodium (Colace) 50 mg capsule Take 1 capsule (50 mg) by mouth once daily. 30 capsule 0    folic acid/multivit-min/lutein (CENTRUM SILVER ORAL) Take 1 tablet by mouth once daily.      HYDROcodone-acetaminophen (Norco) 5-325 mg tablet Take 1 tablet by mouth every 6 hours if needed for severe pain (7 - 10). 120 tablet 0    lidocaine (Lidoderm) 5 % patch Place 1 patch on the skin once daily. Remove after 12 hours      metoprolol succinate XL (Toprol-XL) 50 mg 24 hr tablet take 1 tablet by mouth once daily 30 tablet 5    mirtazapine (Remeron) 15 mg tablet TAKE ONE TABLET BY MOUTH AT BEDTIME ONCE A DAY 30 tablet 0    omeprazole (PriLOSEC) 20 mg DR capsule Take 1 capsule (20 mg) by mouth once daily in the morning. Take before meals. 30 minutes prior      tiZANidine (Zanaflex) 4 mg capsule Take 1 capsule (4 mg) by mouth 3 times a day as needed for muscle spasms (pain) for up to 20 days. 30 capsule 1     No current facility-administered medications for this visit.       ALLERGIES AND DRUG REACTIONS  Allergies  "  Allergen Reactions    Montelukast Hallucinations    Morphine Nausea And Vomiting    Indapamide Headache    Methylprednisolone Other     Jittery      Quinapril Unknown    Triamterene-Hydrochlorothiazid Headache          OBJECTIVE  Visit Vitals  /72   Pulse 77   Resp 18   Ht 1.651 m (5' 5\")   Wt 60.8 kg (134 lb)   BMI 22.30 kg/m²   OB Status Hysterectomy   Smoking Status Former   BSA 1.67 m²       Last Recorded Pain Score (if available):                Physical Exam  Vitals and nursing note reviewed.     General: Sitting in chair, NAD  Head: NCAT  Eyes: Sclera/conjunctiva clear, EOMI, PERRL  Nose/mouth: MMM  CV: Good distal pulses  Lungs: Good/equal chest excursion  Abdomen: Soft, ND  Ext: No cyanosis/edema  MSK: L-spine alignment: thoracic kyphosis, BL paraspinal m TTP, thoracolumbar spine point TTP RESOLVED w/o percussion tenderness; L lumbar TP, L PSIS TTP. spine ROM: pain on extension, rotation, lateral flexion    Neuro: AAOx3   Dermatome sensation to light touch  LEFT L1 (lower pelvis/upper thigh): WNL    RIGHT L1: WNL      LEFT L2 (upper thigh): WNL       RIGHT: L2:WNL      LEFT L3 (medial knee): WNL       RIGHT L3: WNL      LEFT L4 (superior medial malleolus): WNL       RIGHT L4: WNL      LEFT L5 (dorsal foot): WNL       RIGHT L5: WNL      LEFT S1 (lateral foot): WNL     RIGHT S1: WNL      LEFT S2 (popliteal fossa): WNL    RIGHT S2: WNL        Motor strengthLEFT L2 (hip flexion): 5/5   RIGHT L2: 5/5  LEFT L3 (knee extension): 5/5     RIGHT L3: 5/5  LEFT L4 (dorsiflexion): 5/5     RIGHT L4: 5/5  LEFT L5 (EHL extension): 5/5     RIGHT L5: 5/5  LEFT S1 (plantarflexion): 5/5     RIGHT S1: 5/5  LEFT S2 (knee flexion): 5/5      RIGHT S2: 5/5      Psych: affect nl  Skin: no rash/lesions      REVIEW OF LABORATORY DATA  I have reviewed the following lab results:  WBC   Date Value Ref Range Status   07/01/2024 9.2 4.4 - 11.3 x10*3/uL Final     RBC   Date Value Ref Range Status   07/01/2024 4.28 4.00 - 5.20 " x10*6/uL Final     Hemoglobin   Date Value Ref Range Status   07/01/2024 13.0 12.0 - 16.0 g/dL Final     Hematocrit   Date Value Ref Range Status   07/01/2024 39.8 36.0 - 46.0 % Final     MCV   Date Value Ref Range Status   07/01/2024 93 80 - 100 fL Final     MCH   Date Value Ref Range Status   07/01/2024 30.4 26.0 - 34.0 pg Final     MCHC   Date Value Ref Range Status   07/01/2024 32.7 32.0 - 36.0 g/dL Final     RDW   Date Value Ref Range Status   07/01/2024 12.4 11.5 - 14.5 % Final     Platelets   Date Value Ref Range Status   07/01/2024 367 150 - 450 x10*3/uL Final     MPV   Date Value Ref Range Status   08/21/2023 10.4 7.0 - 12.6 CU Final     Sodium   Date Value Ref Range Status   07/01/2024 132 (L) 133 - 145 mmol/L Final     Potassium   Date Value Ref Range Status   07/01/2024 4.5 3.4 - 5.1 mmol/L Final     Bicarbonate   Date Value Ref Range Status   07/01/2024 25 24 - 31 mmol/L Final     Urea Nitrogen   Date Value Ref Range Status   07/01/2024 11 8 - 25 mg/dL Final     Calcium   Date Value Ref Range Status   07/01/2024 9.2 8.5 - 10.4 mg/dL Final     Protime   Date Value Ref Range Status   07/25/2022 11.9 9.8 - 13.4 sec Final     INR   Date Value Ref Range Status   07/25/2022 1.0 0.9 - 1.1 Final         REVIEW OF RADIOLOGY   I have reviewed the following:  Radiology Studies           XR L-spine 6/26/24:  Multiple vertebral compression fractures are present and have  worsened.      The previous T12 vertebral compression fracture has shown greater  degree of worsening now with a near vertebral plana appearance.      There is a new mild-to-moderate vertebral compression fracture at T11  with an approximate 40% anterior height loss.      New mild vertebral compression fractures at L1 and progression of the  L2 vertebral compression fracture from prior study.      Increased kyphosis across the lumbar spine      IMPRESSION:  Interval development of and progression of multiple vertebral  compression fractures with  increasing kyphosis. If deemed clinically  appropriate, MRI can be considered for further assessment.       ASSESSMENT & PLAN  Stephanie Jimenez is a 88 y.o. old female with PMH HTN, GERD, PAF, dementia, SCC s/p Mohs who presents for F/U LB pain    1) LBP  -likely 2/2 new T11 and L1 osteoporotic VCF  -Refractive to >4 w tx including Tylenol, NSAIDs, tramadol, tizanidine, heating pad, >2 w PT, lido patch, [facility]  -MRI T/L-spine 7/11/24: new L1 and T11 VCF with + STIR at T11, but neg at L1  -T11 kypho 7/16/24: 100% ongoing  -More, reproducible pain at lower lumbar spine with TTP and L PSIS TTP. Schedule left lumbar paraspinal m TPI & left VANITA CSI (under US) to target pain generator as seen on imaging and minimize risk/likelihood of chronic opioid use and/or surgery  -Consider L1 kypho        Discussed procedure risks/benefits in detail with patient. Pt meets medical necessity for procedure due to failure of conservative measures. Reviewed procedural risks including bleeding, infection, nerve damage, paralysis. Also reviewed mitigating factors such as screening for infection/blood thinner use, sterile precautions, and image-guidance when applicable. All questions answered. Pt/guardian expressed understanding and choose to proceed                Ezequiel Hernandez MD  Anesthesiologist & Interventional Pain Physician   [facility] [place]  O: [phone]  F: [phone]  3:00 PM  08/21/24

## 2024-08-21 NOTE — PROGRESS NOTES
PAIN MANAGEMENT FOLLOW-UP OFFICE NOTE    Date of Service: 8/21/2024    SUBJECTIVE    CHIEF COMPLAINT: LBP    HISTORY OF PRESENT ILLNESS    Stephanie Jimenez is a 88 y.o. female with PMH HTN, GERD, PAF, dementia, SCC s/p Mohs who presents for F/U LBP. Pt is a poor historian and her daughter is present to assist with hx.     On 7/16, pt underwent T11 kypho with improved thoracic pain. Complains of residual left-sided LBP    Pt denies new-onset numbness, weakness, bowel/bladder incontinence.  Pt denies recent infection, allergy to Latex/iodine/contrast. Patient is currently taking the following blood thinner(s): N/A    Procedure log:  -T11 kypho 7/16/24: 100%    REVIEW OF SYSTEMS  Review of Systems   Constitutional: Negative.    HENT: Negative.     Eyes: Negative.    Respiratory: Negative.     Cardiovascular: Negative.    Gastrointestinal: Negative.    Endocrine: Negative.    Musculoskeletal:  Positive for back pain.   Skin: Negative.    Neurological: Negative.    Hematological: Negative.    Psychiatric/Behavioral: Negative.         PAST MEDICAL HISTORY  Past Medical History:   Diagnosis Date    Actinic keratosis of scalp     Anxiety state     Asthma ([facility])     Dr. Chau    Cystocele, unspecified     Gastritis     Hemorrhoids     Hiatal hernia     Hypercholesterolemia     Hypertension     Osteoporosis     halo fracture right humerus    Prediabetes     Sciatica      Past Surgical History:   Procedure Laterality Date    BIOPSY  2013    lip, noncancerous    BIOPSY Left 03/2021    left leg - apex derm    BIOPSY  03/2021    forehead - apex derm    BLADDER SUSPENSION  1979    COLONOSCOPY  2002    COLONOSCOPY  11/2015    CYSTOSCOPY  2004    with suburethral sling    ESOPHAGOGASTRODUODENOSCOPY      HIP SURGERY Right 06/2019    right hip/femur    TOTAL ABDOMINAL HYSTERECTOMY W/ BILATERAL SALPINGOOPHORECTOMY  1977    URETHRAL SLING  2004    suburethral sling and cystoscopy     Family History   Problem Relation Name Age of  Onset    Heart disease Mother      Lung cancer Father      Other (mouth cancer) Sister      Lung cancer Sister      Heart attack Sister      No Known Problems Sister      Lung cancer Brother      No Known Problems Daughter      No Known Problems Son         CURRENT MEDICATIONS  Current Outpatient Medications   Medication Sig Dispense Refill    acetaminophen (Tylenol 8 HOUR) 650 mg ER tablet Take 2 tablets (1,300 mg) by mouth every 8 hours if needed for mild pain (1 - 3), moderate pain (4 - 6) or headaches.      ALPRAZolam (Xanax) 0.25 mg tablet Take 1 tablet (0.25 mg) by mouth once daily as needed for anxiety. 30 tablet 5    amLODIPine (Norvasc) 10 mg tablet Take 1 tablet (10 mg) by mouth once daily.      atorvastatin (Lipitor) 10 mg tablet Take 1 tablet (10 mg) by mouth once daily.      calcium carbonate-vitamin D3 (Os-Jory 500 + D3) 500 mg-5 mcg (200 unit) tablet Take 1 tablet by mouth once daily.      docusate sodium (Colace) 50 mg capsule Take 1 capsule (50 mg) by mouth once daily. 30 capsule 0    folic acid/multivit-min/lutein (CENTRUM SILVER ORAL) Take 1 tablet by mouth once daily.      HYDROcodone-acetaminophen (Norco) 5-325 mg tablet Take 1 tablet by mouth every 6 hours if needed for severe pain (7 - 10). 120 tablet 0    lidocaine (Lidoderm) 5 % patch Place 1 patch on the skin once daily. Remove after 12 hours      metoprolol succinate XL (Toprol-XL) 50 mg 24 hr tablet take 1 tablet by mouth once daily 30 tablet 5    mirtazapine (Remeron) 15 mg tablet TAKE ONE TABLET BY MOUTH AT BEDTIME ONCE A DAY 30 tablet 0    omeprazole (PriLOSEC) 20 mg DR capsule Take 1 capsule (20 mg) by mouth once daily in the morning. Take before meals. 30 minutes prior      tiZANidine (Zanaflex) 4 mg capsule Take 1 capsule (4 mg) by mouth 3 times a day as needed for muscle spasms (pain) for up to 20 days. 30 capsule 1     No current facility-administered medications for this visit.       ALLERGIES AND DRUG REACTIONS  Allergies  "  Allergen Reactions    Montelukast Hallucinations    Morphine Nausea And Vomiting    Indapamide Headache    Methylprednisolone Other     Jittery      Quinapril Unknown    Triamterene-Hydrochlorothiazid Headache          OBJECTIVE  Visit Vitals  /72   Pulse 77   Resp 18   Ht 1.651 m (5' 5\")   Wt 60.8 kg (134 lb)   BMI 22.30 kg/m²   OB Status Hysterectomy   Smoking Status Former   BSA 1.67 m²       Last Recorded Pain Score (if available):                Physical Exam  Vitals and nursing note reviewed.     General: Sitting in chair, NAD  Head: NCAT  Eyes: Sclera/conjunctiva clear, EOMI, PERRL  Nose/mouth: MMM  CV: Good distal pulses  Lungs: Good/equal chest excursion  Abdomen: Soft, ND  Ext: No cyanosis/edema  MSK: L-spine alignment: thoracic kyphosis, BL paraspinal m TTP, thoracolumbar spine point TTP RESOLVED w/o percussion tenderness; L lumbar TP, L PSIS TTP. spine ROM: pain on extension, rotation, lateral flexion    Neuro: AAOx3   Dermatome sensation to light touch  LEFT L1 (lower pelvis/upper thigh): WNL    RIGHT L1: WNL      LEFT L2 (upper thigh): WNL       RIGHT: L2:WNL      LEFT L3 (medial knee): WNL       RIGHT L3: WNL      LEFT L4 (superior medial malleolus): WNL       RIGHT L4: WNL      LEFT L5 (dorsal foot): WNL       RIGHT L5: WNL      LEFT S1 (lateral foot): WNL     RIGHT S1: WNL      LEFT S2 (popliteal fossa): WNL    RIGHT S2: WNL        Motor strengthLEFT L2 (hip flexion): 5/5   RIGHT L2: 5/5  LEFT L3 (knee extension): 5/5     RIGHT L3: 5/5  LEFT L4 (dorsiflexion): 5/5     RIGHT L4: 5/5  LEFT L5 (EHL extension): 5/5     RIGHT L5: 5/5  LEFT S1 (plantarflexion): 5/5     RIGHT S1: 5/5  LEFT S2 (knee flexion): 5/5      RIGHT S2: 5/5      Psych: affect nl  Skin: no rash/lesions      REVIEW OF LABORATORY DATA  I have reviewed the following lab results:  WBC   Date Value Ref Range Status   07/01/2024 9.2 4.4 - 11.3 x10*3/uL Final     RBC   Date Value Ref Range Status   07/01/2024 4.28 4.00 - 5.20 " x10*6/uL Final     Hemoglobin   Date Value Ref Range Status   07/01/2024 13.0 12.0 - 16.0 g/dL Final     Hematocrit   Date Value Ref Range Status   07/01/2024 39.8 36.0 - 46.0 % Final     MCV   Date Value Ref Range Status   07/01/2024 93 80 - 100 fL Final     MCH   Date Value Ref Range Status   07/01/2024 30.4 26.0 - 34.0 pg Final     MCHC   Date Value Ref Range Status   07/01/2024 32.7 32.0 - 36.0 g/dL Final     RDW   Date Value Ref Range Status   07/01/2024 12.4 11.5 - 14.5 % Final     Platelets   Date Value Ref Range Status   07/01/2024 367 150 - 450 x10*3/uL Final     MPV   Date Value Ref Range Status   08/21/2023 10.4 7.0 - 12.6 CU Final     Sodium   Date Value Ref Range Status   07/01/2024 132 (L) 133 - 145 mmol/L Final     Potassium   Date Value Ref Range Status   07/01/2024 4.5 3.4 - 5.1 mmol/L Final     Bicarbonate   Date Value Ref Range Status   07/01/2024 25 24 - 31 mmol/L Final     Urea Nitrogen   Date Value Ref Range Status   07/01/2024 11 8 - 25 mg/dL Final     Calcium   Date Value Ref Range Status   07/01/2024 9.2 8.5 - 10.4 mg/dL Final     Protime   Date Value Ref Range Status   07/25/2022 11.9 9.8 - 13.4 sec Final     INR   Date Value Ref Range Status   07/25/2022 1.0 0.9 - 1.1 Final         REVIEW OF RADIOLOGY   I have reviewed the following:  Radiology Studies           XR L-spine 6/26/24:  Multiple vertebral compression fractures are present and have  worsened.      The previous T12 vertebral compression fracture has shown greater  degree of worsening now with a near vertebral plana appearance.      There is a new mild-to-moderate vertebral compression fracture at T11  with an approximate 40% anterior height loss.      New mild vertebral compression fractures at L1 and progression of the  L2 vertebral compression fracture from prior study.      Increased kyphosis across the lumbar spine      IMPRESSION:  Interval development of and progression of multiple vertebral  compression fractures with  increasing kyphosis. If deemed clinically  appropriate, MRI can be considered for further assessment.       ASSESSMENT & PLAN  Stephanie Jimenez is a 88 y.o. old female with PMH HTN, GERD, PAF, dementia, SCC s/p Mohs who presents for F/U LB pain    1) LBP  -likely 2/2 new T11 and L1 osteoporotic VCF  -Refractive to >4 w tx including Tylenol, NSAIDs, tramadol, tizanidine, heating pad, >2 w PT, lido patch, [place]  -MRI T/L-spine 7/11/24: new L1 and T11 VCF with + STIR at T11, but neg at L1  -T11 kypho 7/16/24: 100% ongoing  -More, reproducible pain at lower lumbar spine with TTP and L PSIS TTP. Schedule left lumbar paraspinal m TPI & left VANITA CSI (under US) to target pain generator as seen on imaging and minimize risk/likelihood of chronic opioid use and/or surgery  -Consider L1 kypho        Discussed procedure risks/benefits in detail with patient. Pt meets medical necessity for procedure due to failure of conservative measures. Reviewed procedural risks including bleeding, infection, nerve damage, paralysis. Also reviewed mitigating factors such as screening for infection/blood thinner use, sterile precautions, and image-guidance when applicable. All questions answered. Pt/guardian expressed understanding and choose to proceed                Ezequiel Hernandez MD  Anesthesiologist & Interventional Pain Physician   [facility] [place]  O: [phone]  F: [phone]  3:00 PM  08/21/24

## 2024-08-24 PROBLEM — M79.18 DIFFUSE MYOFASCIAL PAIN SYNDROME: Status: ACTIVE | Noted: 2024-08-24

## 2024-08-24 PROBLEM — M46.08: Status: ACTIVE | Noted: 2024-08-24

## 2024-08-29 ENCOUNTER — HOSPITAL ENCOUNTER (OUTPATIENT)
Dept: GASTROENTEROLOGY | Facility: HOSPITAL | Age: 89
Discharge: HOME | End: 2024-08-29
Payer: MEDICARE

## 2024-08-29 VITALS
BODY MASS INDEX: 22.82 KG/M2 | SYSTOLIC BLOOD PRESSURE: 156 MMHG | OXYGEN SATURATION: 100 % | HEART RATE: 81 BPM | DIASTOLIC BLOOD PRESSURE: 61 MMHG | RESPIRATION RATE: 18 BRPM | HEIGHT: 65 IN | TEMPERATURE: 97.5 F | WEIGHT: 137 LBS

## 2024-08-29 DIAGNOSIS — M46.08 SPINAL ENTHESOPATHY, SACRAL AND SACROCOCCYGEAL REGION (CMS-HCC): ICD-10-CM

## 2024-08-29 DIAGNOSIS — M79.18 DIFFUSE MYOFASCIAL PAIN SYNDROME: ICD-10-CM

## 2024-08-29 DIAGNOSIS — K59.03 DRUG INDUCED CONSTIPATION: ICD-10-CM

## 2024-08-29 PROCEDURE — 76942 ECHO GUIDE FOR BIOPSY: CPT | Performed by: ANESTHESIOLOGY

## 2024-08-29 PROCEDURE — 20550 NJX 1 TENDON SHEATH/LIGAMENT: CPT | Performed by: ANESTHESIOLOGY

## 2024-08-29 PROCEDURE — 2500000004 HC RX 250 GENERAL PHARMACY W/ HCPCS (ALT 636 FOR OP/ED): Performed by: ANESTHESIOLOGY

## 2024-08-29 PROCEDURE — 2500000005 HC RX 250 GENERAL PHARMACY W/O HCPCS: Performed by: ANESTHESIOLOGY

## 2024-08-29 PROCEDURE — 20553 NJX 1/MLT TRIGGER POINTS 3/>: CPT | Performed by: ANESTHESIOLOGY

## 2024-08-29 RX ORDER — TRIAMCINOLONE ACETONIDE 40 MG/ML
INJECTION, SUSPENSION INTRA-ARTICULAR; INTRAMUSCULAR AS NEEDED
Status: COMPLETED | OUTPATIENT
Start: 2024-08-29 | End: 2024-08-29

## 2024-08-29 RX ORDER — BUPIVACAINE HYDROCHLORIDE 5 MG/ML
INJECTION, SOLUTION PERINEURAL AS NEEDED
Status: COMPLETED | OUTPATIENT
Start: 2024-08-29 | End: 2024-08-29

## 2024-08-29 RX ORDER — LIDOCAINE HYDROCHLORIDE 10 MG/ML
INJECTION, SOLUTION EPIDURAL; INFILTRATION; INTRACAUDAL; PERINEURAL AS NEEDED
Status: COMPLETED | OUTPATIENT
Start: 2024-08-29 | End: 2024-08-29

## 2024-08-29 ASSESSMENT — PAIN SCALES - GENERAL
PAINLEVEL_OUTOF10: 0 - NO PAIN
PAINLEVEL_OUTOF10: 8

## 2024-08-29 ASSESSMENT — PAIN - FUNCTIONAL ASSESSMENT
PAIN_FUNCTIONAL_ASSESSMENT: 0-10
PAIN_FUNCTIONAL_ASSESSMENT: 0-10

## 2024-08-29 ASSESSMENT — PAIN DESCRIPTION - DESCRIPTORS: DESCRIPTORS: ACHING

## 2024-08-29 ASSESSMENT — COLUMBIA-SUICIDE SEVERITY RATING SCALE - C-SSRS
2. HAVE YOU ACTUALLY HAD ANY THOUGHTS OF KILLING YOURSELF?: NO
1. IN THE PAST MONTH, HAVE YOU WISHED YOU WERE DEAD OR WISHED YOU COULD GO TO SLEEP AND NOT WAKE UP?: NO
6. HAVE YOU EVER DONE ANYTHING, STARTED TO DO ANYTHING, OR PREPARED TO DO ANYTHING TO END YOUR LIFE?: NO

## 2024-08-29 NOTE — INTERVAL H&P NOTE
H&P reviewed. The patient was examined and there are no changes to the H&P. Stephanie Jimenez is a 88 y.o. female with PMH HTN, GERD, PAF, dementia, SCC s/p Mohs who presents for left lumbar paraspinal m TPI & left VANITA CSI (under US) to target pain generator as seen on PE and minimize risk/likelihood of chronic opioid use and/or surgery.  Patient's pain stable and persistent from last visit.  No personal/family hx issues with anesthesia. Denies allergies to Latex, steroids, local anesthetics, or iodine/contrast. Denies being on blood thinners. Not diabetic.  Denies fever, chills, NS, CP, SOB, cough, N/V.    Discussed procedure risks/benefits in detail with patient. Pt meets medical necessity for procedure due to failure of conservative measures. Reviewed procedural risks including bleeding, infection, nerve damage, paralysis. Also reviewed mitigating factors such as screening for infection/blood thinner use, sterile precautions, and image-guidance when applicable. All questions answered. Pt/guardian expressed understanding and choose to proceed      Ezequiel Hernandez MD  Anesthesiologist & Interventional Pain Physician   [facility] [place]  O: [phone]  F: [phone]  8:34 AM  08/29/24

## 2024-08-29 NOTE — DISCHARGE INSTRUCTIONS
DISCHARGE INSTRUCTIONS FOR INJECTIONS     You underwent lumbar trigger point injections and a left sacroiliac ligament injection today    Aftermost injections, it is recommended that you relax and limit your activity for the remainder of the day unless you have been told otherwise by your pain physician.  You should not drive a car, operate machinery, or make important legal decisions unless otherwise directed by your pain physician.  You may resume your normal activity, including exercise, tomorrow.      Keep a written pain diary of how much pain relief you experienced following the injection procedure and the length of time of pain relief you experienced pain relief. Following diagnostic injections like medial branch nerve blocks, sacroiliac joint blocks, stellate ganglion injections and other blocks, it is very important you record the specific amount of pain relief you experienced immediately after the injectionand how long it lasted. Your doctor will ask you for this information at your follow up visit.     For all injections, please keep the injection site dry and inspect the site for a couple of days. You may remove the Band-Aid the day of the injection at any time.     Some discomfort, bruising or slight swelling may occur at the injection site. This is not abnormal if it occurs.  If needed you may:    -Take over the counter medication such as Tylenol or Motrin.   -Apply an ice pack for 30 minutes, 2 to 3 times a day for the first 24 hours.     You may shower today; no soaking baths, hot tubs, whirlpools or swimming pools for two days.      If you are given steroids in your injection, it may take 3-5 days for the steroid medication to take effect. You may notice a worsening of your symptoms for 1-2 days after the injection. This is not abnormal.  You may use acetaminophen, ibuprofen, or prescription medication that your doctor may have prescribed for you if you need to do so.     A few common side effects of  steroids include facial flushing, sweating, restlessness, irritability,difficulty sleeping, increase in blood sugar, and increased blood pressure. If you have diabetes, please monitor your blood sugar at least once a day for at least 5 days. If you have poorly controlled high blood pressure, monitoryour blood pressure for at least 2 days and contact your primary care physician if these numbers are unusually high for you.      If you take aspirin or non-steroidal anti-inflammatory drugs (examples are Motrin, Advil, ibuprofen, Naprosyn, Voltaren, Relafen, etc.) you may restart these this evening, but stop taking it 3 days before your next appointment, unless instructed otherwiseby your physician.      You do not need to discontinue non-aspirin-containing pain medications prior to an injection (examples: Celebrex, tramadol, hydrocodone and acetaminophen).      If you take a blood thinning medication (Coumadin, Lovenox, Fragmin,Ticlid, Plavix, Pradaxa, etc.), please discuss this with your primary care physician/cardiologist and your pain physician. These medications MUST be discontinued before you can have an injection safely, without the risk of uncontrolled bleeding. If these medications are not discontinued for an appropriate period of time, you will not be able to receivean injection.      If you are taking Coumadin, please have your INR checked the morning of your procedure and bringthe result to your appointment unless otherwise instructed. If your INR is over 1.2, your injection may need to be rescheduled to avoid uncontrolled bleeding from the needle placement.     Call [facility] at [phone] between 8am-4pm Monday - Friday if you are experiencing the following:    If you received an epidural or spinal injection:    -Headache that doesnot go away with medicine, is worse when sitting or standing up, and is greatly relieved upon lying down.   -Severe pain worse than or different than your  baseline pain.   -Chills or fever (101º F or greater).   -Drainage or signs of infection at the injection site     Go directly to the Emergency Department if you are experiencing the following and received an epidural or spinal injection:   -Abrupt weakness or progressive weakness in your legs that starts after you leave the clinic.   -Abrupt severe or worsening numbness in your legs.   -Inability to urinate after the injection or loss of bowel or bladder control without the urge to defecate or urinate.     If you have a clinical question that cannot wait until your next appointment, please call [phone] between 8am-4pm Monday - Friday or send a [employer] message. We do our best to return all non-emergency messages within 24 hours, Monday - Friday. A nurse or physician will return your message.      If you need to cancel an appointment, please call the scheduling staff at [phone] during normal business hours or leave a message at least 24 hours in advance.     If you are going to be sedated for your next procedure, you MUST have responsible adult who can legally drive accompany you home. You cannot eat or drink for eight hours prior to the planned procedure if you are going to receive sedation. You may take your non-blood thinning medications with a small sip of water.

## 2024-08-29 NOTE — POST-PROCEDURE NOTE
Dressing dry and intact. No neuro deficits. Ready for discharge.  Home with her daughter. Chair to car.

## 2024-09-24 ENCOUNTER — TELEPHONE (OUTPATIENT)
Dept: PRIMARY CARE | Facility: CLINIC | Age: 89
End: 2024-09-24
Payer: MEDICARE

## 2024-09-25 NOTE — TELEPHONE ENCOUNTER
Spoke with the Pt she verbally understands.Pt asked for xanax medication to be sent to [facility] by fax [phone]

## 2024-10-04 ENCOUNTER — OFFICE VISIT (OUTPATIENT)
Dept: PAIN MEDICINE | Facility: CLINIC | Age: 89
End: 2024-10-04
Payer: MEDICARE

## 2024-10-04 VITALS
OXYGEN SATURATION: 98 % | HEART RATE: 76 BPM | DIASTOLIC BLOOD PRESSURE: 64 MMHG | BODY MASS INDEX: 22.82 KG/M2 | WEIGHT: 137 LBS | SYSTOLIC BLOOD PRESSURE: 112 MMHG | HEIGHT: 65 IN

## 2024-10-04 DIAGNOSIS — M79.18 DIFFUSE MYOFASCIAL PAIN SYNDROME: ICD-10-CM

## 2024-10-04 DIAGNOSIS — Z98.890 HISTORY OF KYPHOPLASTY: ICD-10-CM

## 2024-10-04 DIAGNOSIS — M47.816 LUMBAR SPONDYLOSIS: Primary | ICD-10-CM

## 2024-10-04 PROCEDURE — 99214 OFFICE O/P EST MOD 30 MIN: CPT | Performed by: ANESTHESIOLOGY

## 2024-10-04 PROCEDURE — 1160F RVW MEDS BY RX/DR IN RCRD: CPT | Performed by: ANESTHESIOLOGY

## 2024-10-04 PROCEDURE — 3078F DIAST BP <80 MM HG: CPT | Performed by: ANESTHESIOLOGY

## 2024-10-04 PROCEDURE — 1125F AMNT PAIN NOTED PAIN PRSNT: CPT | Performed by: ANESTHESIOLOGY

## 2024-10-04 PROCEDURE — 1123F ACP DISCUSS/DSCN MKR DOCD: CPT | Performed by: ANESTHESIOLOGY

## 2024-10-04 PROCEDURE — 1159F MED LIST DOCD IN RCRD: CPT | Performed by: ANESTHESIOLOGY

## 2024-10-04 PROCEDURE — 3074F SYST BP LT 130 MM HG: CPT | Performed by: ANESTHESIOLOGY

## 2024-10-04 ASSESSMENT — PATIENT HEALTH QUESTIONNAIRE - PHQ9
1. LITTLE INTEREST OR PLEASURE IN DOING THINGS: NOT AT ALL
2. FEELING DOWN, DEPRESSED OR HOPELESS: NOT AT ALL
SUM OF ALL RESPONSES TO PHQ9 QUESTIONS 1 AND 2: 0

## 2024-10-04 ASSESSMENT — PAIN SCALES - GENERAL
PAINLEVEL_OUTOF10: 4
PAINLEVEL: 4

## 2024-10-04 ASSESSMENT — PAIN DESCRIPTION - DESCRIPTORS: DESCRIPTORS: ACHING

## 2024-10-04 ASSESSMENT — ENCOUNTER SYMPTOMS
ENDOCRINE NEGATIVE: 1
RESPIRATORY NEGATIVE: 1
BACK PAIN: 1
HEMATOLOGIC/LYMPHATIC NEGATIVE: 1
CARDIOVASCULAR NEGATIVE: 1
PSYCHIATRIC NEGATIVE: 1
NEUROLOGICAL NEGATIVE: 1
GASTROINTESTINAL NEGATIVE: 1
CONSTITUTIONAL NEGATIVE: 1
EYES NEGATIVE: 1

## 2024-10-04 ASSESSMENT — PAIN - FUNCTIONAL ASSESSMENT: PAIN_FUNCTIONAL_ASSESSMENT: 0-10

## 2024-10-04 NOTE — H&P (VIEW-ONLY)
PAIN MANAGEMENT FOLLOW-UP OFFICE NOTE    Date of Service: 10/4/2024    SUBJECTIVE    CHIEF COMPLAINT: LBP    HISTORY OF PRESENT ILLNESS    Stephanie Jimenez is a 88 y.o. female with PMH HTN, GERD, PAF, dementia, SCC s/p Mohs who presents for F/U LBP. Pt is a poor historian and her daughter is present to assist with hx.     On 8/29, pt underwent TPI/L VANITA CSI with 75% ongoing relief of LBP. Notes ongoing LBP with standing/walking.     Pt denies new-onset numbness, weakness, bowel/bladder incontinence.  Pt denies recent infection, allergy to Latex/iodine/contrast. Patient is currently taking the following blood thinner(s): N/A    Procedure log:  -TPI/L VANITA CSI 8/29/24: 75% ongoing relief  -T11 kypho 7/16/24: 100%    REVIEW OF SYSTEMS  Review of Systems   Constitutional: Negative.    HENT: Negative.     Eyes: Negative.    Respiratory: Negative.     Cardiovascular: Negative.    Gastrointestinal: Negative.    Endocrine: Negative.    Musculoskeletal:  Positive for back pain.   Skin: Negative.    Neurological: Negative.    Hematological: Negative.    Psychiatric/Behavioral: Negative.         PAST MEDICAL HISTORY  Past Medical History:   Diagnosis Date    Actinic keratosis of scalp     Anxiety state     Asthma     Dr. Chau    Cystocele, unspecified     Gastritis     Hemorrhoids     Hiatal hernia     Hypercholesterolemia     Hypertension     Osteoporosis     halo fracture right humerus    Prediabetes     Sciatica      Past Surgical History:   Procedure Laterality Date    BIOPSY  2013    lip, noncancerous    BIOPSY Left 03/2021    left leg - apex derm    BIOPSY  03/2021    forehead - apex derm    BLADDER SUSPENSION  1979    COLONOSCOPY  2002    COLONOSCOPY  11/2015    CYSTOSCOPY  2004    with suburethral sling    ESOPHAGOGASTRODUODENOSCOPY      HIP SURGERY Right 06/2019    right hip/femur    TOTAL ABDOMINAL HYSTERECTOMY W/ BILATERAL SALPINGOOPHORECTOMY  1977    URETHRAL SLING  2004    suburethral sling and cystoscopy      Family History   Problem Relation Name Age of Onset    Heart disease Mother      Lung cancer Father      Other (mouth cancer) Sister      Lung cancer Sister      Heart attack Sister      No Known Problems Sister      Lung cancer Brother      No Known Problems Daughter      No Known Problems Son         CURRENT MEDICATIONS  Current Outpatient Medications   Medication Sig Dispense Refill    acetaminophen (Tylenol 8 HOUR) 650 mg ER tablet Take 2 tablets (1,300 mg) by mouth every 8 hours if needed for mild pain (1 - 3), moderate pain (4 - 6) or headaches.      ALPRAZolam (Xanax) 0.25 mg tablet Take 1 tablet (0.25 mg) by mouth once daily as needed for anxiety. 30 tablet 5    amLODIPine (Norvasc) 10 mg tablet Take 1 tablet (10 mg) by mouth once daily.      atorvastatin (Lipitor) 10 mg tablet Take 1 tablet (10 mg) by mouth once daily.      calcium carbonate-vitamin D3 (Os-Jory 500 + D3) 500 mg-5 mcg (200 unit) tablet Take 1 tablet by mouth once daily.      folic acid/multivit-min/lutein (CENTRUM SILVER ORAL) Take 1 tablet by mouth once daily.      lidocaine (Lidoderm) 5 % patch Place 1 patch on the skin once daily. Remove after 12 hours      metoprolol succinate XL (Toprol-XL) 50 mg 24 hr tablet take 1 tablet by mouth once daily 30 tablet 5    mirtazapine (Remeron) 15 mg tablet TAKE ONE TABLET BY MOUTH AT BEDTIME ONCE A DAY 30 tablet 0    omeprazole (PriLOSEC) 20 mg DR capsule Take 1 capsule (20 mg) by mouth once daily in the morning. Take before meals. 30 minutes prior      tiZANidine (Zanaflex) 4 mg capsule Take 1 capsule (4 mg) by mouth 3 times a day as needed for muscle spasms (pain) for up to 20 days. 30 capsule 1     No current facility-administered medications for this visit.       ALLERGIES AND DRUG REACTIONS  Allergies   Allergen Reactions    Montelukast Hallucinations    Morphine Nausea And Vomiting    Indapamide Headache    Methylprednisolone Other     Jittery      Quinapril Unknown     "Triamterene-Hydrochlorothiazid Headache          OBJECTIVE  Visit Vitals  /64   Pulse 76   Ht 1.651 m (5' 5\")   Wt 62.1 kg (137 lb)   SpO2 98%   BMI 22.80 kg/m²   OB Status Hysterectomy   Smoking Status Former   BSA 1.69 m²       Last Recorded Pain Score (if available):                Physical Exam  Vitals and nursing note reviewed.     General: Sitting in chair, NAD  Head: NCAT  Eyes: Sclera/conjunctiva clear, EOMI, PERRL  Nose/mouth: MMM  CV: Good distal pulses  Lungs: Good/equal chest excursion  Abdomen: Soft, ND  Ext: No cyanosis/edema  MSK: L-spine alignment: thoracic kyphosis, BL paraspinal m TTP,  ROM: +facet-loading BL    Neuro: AAOx3, CN Grossly nl  Dermatome sensation to light touch  LEFT L1 (lower pelvis/upper thigh): WNL    RIGHT L1: WNL      LEFT L2 (upper thigh): WNL       RIGHT: L2:WNL      LEFT L3 (medial knee): WNL       RIGHT L3: WNL      LEFT L4 (superior medial malleolus): WNL       RIGHT L4: WNL      LEFT L5 (dorsal foot): WNL       RIGHT L5: WNL      LEFT S1 (lateral foot): WNL     RIGHT S1: WNL      LEFT S2 (popliteal fossa): WNL    RIGHT S2: WNL        Motor strength  LEFT L2 (hip flexion): 5/5   RIGHT L2: 5/5  LEFT L3 (knee extension): 5/5     RIGHT L3: 5/5  LEFT L4 (dorsiflexion): 5/5     RIGHT L4: 5/5  LEFT L5 (EHL extension): 5/5     RIGHT L5: 5/5  LEFT S1 (plantarflexion): 5/5     RIGHT S1: 5/5  LEFT S2 (knee flexion): 5/5      RIGHT S2: 5/5      Psych: affect nl  Skin: no rash/lesions      REVIEW OF LABORATORY DATA  I have reviewed the following lab results:  WBC   Date Value Ref Range Status   07/01/2024 9.2 4.4 - 11.3 x10*3/uL Final     RBC   Date Value Ref Range Status   07/01/2024 4.28 4.00 - 5.20 x10*6/uL Final     Hemoglobin   Date Value Ref Range Status   07/01/2024 13.0 12.0 - 16.0 g/dL Final     Hematocrit   Date Value Ref Range Status   07/01/2024 39.8 36.0 - 46.0 % Final     MCV   Date Value Ref Range Status   07/01/2024 93 80 - 100 fL Final     MCH   Date Value Ref " Range Status   07/01/2024 30.4 26.0 - 34.0 pg Final     MCHC   Date Value Ref Range Status   07/01/2024 32.7 32.0 - 36.0 g/dL Final     RDW   Date Value Ref Range Status   07/01/2024 12.4 11.5 - 14.5 % Final     Platelets   Date Value Ref Range Status   07/01/2024 367 150 - 450 x10*3/uL Final     MPV   Date Value Ref Range Status   08/21/2023 10.4 7.0 - 12.6 CU Final     Sodium   Date Value Ref Range Status   07/01/2024 132 (L) 133 - 145 mmol/L Final     Potassium   Date Value Ref Range Status   07/01/2024 4.5 3.4 - 5.1 mmol/L Final     Bicarbonate   Date Value Ref Range Status   07/01/2024 25 24 - 31 mmol/L Final     Urea Nitrogen   Date Value Ref Range Status   07/01/2024 11 8 - 25 mg/dL Final     Calcium   Date Value Ref Range Status   07/01/2024 9.2 8.5 - 10.4 mg/dL Final     Protime   Date Value Ref Range Status   07/25/2022 11.9 9.8 - 13.4 sec Final     INR   Date Value Ref Range Status   07/25/2022 1.0 0.9 - 1.1 Final         REVIEW OF RADIOLOGY   I have reviewed the following:  Radiology Studies           XR L-spine 6/26/24:  Multiple vertebral compression fractures are present and have  worsened.      The previous T12 vertebral compression fracture has shown greater  degree of worsening now with a near vertebral plana appearance.      There is a new mild-to-moderate vertebral compression fracture at T11  with an approximate 40% anterior height loss.      New mild vertebral compression fractures at L1 and progression of the  L2 vertebral compression fracture from prior study.      Increased kyphosis across the lumbar spine      IMPRESSION:  Interval development of and progression of multiple vertebral  compression fractures with increasing kyphosis. If deemed clinically  appropriate, MRI can be considered for further assessment.       ASSESSMENT & PLAN  Stephanie Jimenez is a 88 y.o. female with PMH HTN, GERD, PAF, dementia, SCC s/p Mohs who presents for F/U LB pain    1) Vertebral compression fx,  improved  -T11 kypho 7/16/24: 100% ongoing    2) Myofascial pain  -TPI/L VANITA CSI 8/29/24: 75% ongoing relief  -Repeat PRN    3) Lumbar spondylosis  -Refractive to >4 mo tx including Tylenol, NSAIDs, tramadol, tizanidine, heating pad, >2 w PT, lido patch, [place]  -MRI T/L-spine 7/11/24: multilevel spondylosis  -Schedule prognostic medial branch nerve blocks of BL L4-5, L5-S1 facets with local anesthetic only under fluoroscopic guidance to assess candidacy for RFA          Discussed procedure risks/benefits in detail with patient. Pt meets medical necessity for procedure due to failure of conservative measures. Reviewed procedural risks including bleeding, infection, nerve damage, paralysis. Also reviewed mitigating factors such as screening for infection/blood thinner use, sterile precautions, and image-guidance when applicable. All questions answered. Pt/guardian expressed understanding and choose to proceed                Ezequiel Hernandez MD  Anesthesiologist & Interventional Pain Physician   [facility]  O: [phone]  F: [phone]  10:20 AM  10/04/24

## 2024-10-04 NOTE — PROGRESS NOTES
PAIN MANAGEMENT FOLLOW-UP OFFICE NOTE    Date of Service: 10/4/2024    SUBJECTIVE    CHIEF COMPLAINT: LBP    HISTORY OF PRESENT ILLNESS    Stephanie Jimenez is a 88 y.o. female with PMH HTN, GERD, PAF, dementia, SCC s/p Mohs who presents for F/U LBP. Pt is a poor historian and her daughter is present to assist with hx.     On 8/29, pt underwent TPI/L VANITA CSI with 75% ongoing relief of LBP. Notes ongoing LBP with standing/walking.     Pt denies new-onset numbness, weakness, bowel/bladder incontinence.  Pt denies recent infection, allergy to Latex/iodine/contrast. Patient is currently taking the following blood thinner(s): N/A    Procedure log:  -TPI/L VANITA CSI 8/29/24: 75% ongoing relief  -T11 kypho 7/16/24: 100%    REVIEW OF SYSTEMS  Review of Systems   Constitutional: Negative.    HENT: Negative.     Eyes: Negative.    Respiratory: Negative.     Cardiovascular: Negative.    Gastrointestinal: Negative.    Endocrine: Negative.    Musculoskeletal:  Positive for back pain.   Skin: Negative.    Neurological: Negative.    Hematological: Negative.    Psychiatric/Behavioral: Negative.         PAST MEDICAL HISTORY  Past Medical History:   Diagnosis Date    Actinic keratosis of scalp     Anxiety state     Asthma     Dr. Chau    Cystocele, unspecified     Gastritis     Hemorrhoids     Hiatal hernia     Hypercholesterolemia     Hypertension     Osteoporosis     halo fracture right humerus    Prediabetes     Sciatica      Past Surgical History:   Procedure Laterality Date    BIOPSY  2013    lip, noncancerous    BIOPSY Left 03/2021    left leg - apex derm    BIOPSY  03/2021    forehead - apex derm    BLADDER SUSPENSION  1979    COLONOSCOPY  2002    COLONOSCOPY  11/2015    CYSTOSCOPY  2004    with suburethral sling    ESOPHAGOGASTRODUODENOSCOPY      HIP SURGERY Right 06/2019    right hip/femur    TOTAL ABDOMINAL HYSTERECTOMY W/ BILATERAL SALPINGOOPHORECTOMY  1977    URETHRAL SLING  2004    suburethral sling and cystoscopy      Family History   Problem Relation Name Age of Onset    Heart disease Mother      Lung cancer Father      Other (mouth cancer) Sister      Lung cancer Sister      Heart attack Sister      No Known Problems Sister      Lung cancer Brother      No Known Problems Daughter      No Known Problems Son         CURRENT MEDICATIONS  Current Outpatient Medications   Medication Sig Dispense Refill    acetaminophen (Tylenol 8 HOUR) 650 mg ER tablet Take 2 tablets (1,300 mg) by mouth every 8 hours if needed for mild pain (1 - 3), moderate pain (4 - 6) or headaches.      ALPRAZolam (Xanax) 0.25 mg tablet Take 1 tablet (0.25 mg) by mouth once daily as needed for anxiety. 30 tablet 5    amLODIPine (Norvasc) 10 mg tablet Take 1 tablet (10 mg) by mouth once daily.      atorvastatin (Lipitor) 10 mg tablet Take 1 tablet (10 mg) by mouth once daily.      calcium carbonate-vitamin D3 (Os-Jory 500 + D3) 500 mg-5 mcg (200 unit) tablet Take 1 tablet by mouth once daily.      folic acid/multivit-min/lutein (CENTRUM SILVER ORAL) Take 1 tablet by mouth once daily.      lidocaine (Lidoderm) 5 % patch Place 1 patch on the skin once daily. Remove after 12 hours      metoprolol succinate XL (Toprol-XL) 50 mg 24 hr tablet take 1 tablet by mouth once daily 30 tablet 5    mirtazapine (Remeron) 15 mg tablet TAKE ONE TABLET BY MOUTH AT BEDTIME ONCE A DAY 30 tablet 0    omeprazole (PriLOSEC) 20 mg DR capsule Take 1 capsule (20 mg) by mouth once daily in the morning. Take before meals. 30 minutes prior      tiZANidine (Zanaflex) 4 mg capsule Take 1 capsule (4 mg) by mouth 3 times a day as needed for muscle spasms (pain) for up to 20 days. 30 capsule 1     No current facility-administered medications for this visit.       ALLERGIES AND DRUG REACTIONS  Allergies   Allergen Reactions    Montelukast Hallucinations    Morphine Nausea And Vomiting    Indapamide Headache    Methylprednisolone Other     Jittery      Quinapril Unknown     "Triamterene-Hydrochlorothiazid Headache          OBJECTIVE  Visit Vitals  /64   Pulse 76   Ht 1.651 m (5' 5\")   Wt 62.1 kg (137 lb)   SpO2 98%   BMI 22.80 kg/m²   OB Status Hysterectomy   Smoking Status Former   BSA 1.69 m²       Last Recorded Pain Score (if available):                Physical Exam  Vitals and nursing note reviewed.     General: Sitting in chair, NAD  Head: NCAT  Eyes: Sclera/conjunctiva clear, EOMI, PERRL  Nose/mouth: MMM  CV: Good distal pulses  Lungs: Good/equal chest excursion  Abdomen: Soft, ND  Ext: No cyanosis/edema  MSK: L-spine alignment: thoracic kyphosis, BL paraspinal m TTP,  ROM: +facet-loading BL    Neuro: AAOx3, CN Grossly nl  Dermatome sensation to light touch  LEFT L1 (lower pelvis/upper thigh): WNL    RIGHT L1: WNL      LEFT L2 (upper thigh): WNL       RIGHT: L2:WNL      LEFT L3 (medial knee): WNL       RIGHT L3: WNL      LEFT L4 (superior medial malleolus): WNL       RIGHT L4: WNL      LEFT L5 (dorsal foot): WNL       RIGHT L5: WNL      LEFT S1 (lateral foot): WNL     RIGHT S1: WNL      LEFT S2 (popliteal fossa): WNL    RIGHT S2: WNL        Motor strength  LEFT L2 (hip flexion): 5/5   RIGHT L2: 5/5  LEFT L3 (knee extension): 5/5     RIGHT L3: 5/5  LEFT L4 (dorsiflexion): 5/5     RIGHT L4: 5/5  LEFT L5 (EHL extension): 5/5     RIGHT L5: 5/5  LEFT S1 (plantarflexion): 5/5     RIGHT S1: 5/5  LEFT S2 (knee flexion): 5/5      RIGHT S2: 5/5      Psych: affect nl  Skin: no rash/lesions      REVIEW OF LABORATORY DATA  I have reviewed the following lab results:  WBC   Date Value Ref Range Status   07/01/2024 9.2 4.4 - 11.3 x10*3/uL Final     RBC   Date Value Ref Range Status   07/01/2024 4.28 4.00 - 5.20 x10*6/uL Final     Hemoglobin   Date Value Ref Range Status   07/01/2024 13.0 12.0 - 16.0 g/dL Final     Hematocrit   Date Value Ref Range Status   07/01/2024 39.8 36.0 - 46.0 % Final     MCV   Date Value Ref Range Status   07/01/2024 93 80 - 100 fL Final     MCH   Date Value Ref " Range Status   07/01/2024 30.4 26.0 - 34.0 pg Final     MCHC   Date Value Ref Range Status   07/01/2024 32.7 32.0 - 36.0 g/dL Final     RDW   Date Value Ref Range Status   07/01/2024 12.4 11.5 - 14.5 % Final     Platelets   Date Value Ref Range Status   07/01/2024 367 150 - 450 x10*3/uL Final     MPV   Date Value Ref Range Status   08/21/2023 10.4 7.0 - 12.6 CU Final     Sodium   Date Value Ref Range Status   07/01/2024 132 (L) 133 - 145 mmol/L Final     Potassium   Date Value Ref Range Status   07/01/2024 4.5 3.4 - 5.1 mmol/L Final     Bicarbonate   Date Value Ref Range Status   07/01/2024 25 24 - 31 mmol/L Final     Urea Nitrogen   Date Value Ref Range Status   07/01/2024 11 8 - 25 mg/dL Final     Calcium   Date Value Ref Range Status   07/01/2024 9.2 8.5 - 10.4 mg/dL Final     Protime   Date Value Ref Range Status   07/25/2022 11.9 9.8 - 13.4 sec Final     INR   Date Value Ref Range Status   07/25/2022 1.0 0.9 - 1.1 Final         REVIEW OF RADIOLOGY   I have reviewed the following:  Radiology Studies           XR L-spine 6/26/24:  Multiple vertebral compression fractures are present and have  worsened.      The previous T12 vertebral compression fracture has shown greater  degree of worsening now with a near vertebral plana appearance.      There is a new mild-to-moderate vertebral compression fracture at T11  with an approximate 40% anterior height loss.      New mild vertebral compression fractures at L1 and progression of the  L2 vertebral compression fracture from prior study.      Increased kyphosis across the lumbar spine      IMPRESSION:  Interval development of and progression of multiple vertebral  compression fractures with increasing kyphosis. If deemed clinically  appropriate, MRI can be considered for further assessment.       ASSESSMENT & PLAN  Stephanie Jimenez is a 88 y.o. female with PMH HTN, GERD, PAF, dementia, SCC s/p Mohs who presents for F/U LB pain    1) Vertebral compression fx,  improved  -T11 kypho 7/16/24: 100% ongoing    2) Myofascial pain  -TPI/L VANITA CSI 8/29/24: 75% ongoing relief  -Repeat PRN    3) Lumbar spondylosis  -Refractive to >4 mo tx including Tylenol, NSAIDs, tramadol, tizanidine, heating pad, >2 w PT, lido patch, [place]  -MRI T/L-spine 7/11/24: multilevel spondylosis  -Schedule prognostic medial branch nerve blocks of BL L4-5, L5-S1 facets with local anesthetic only under fluoroscopic guidance to assess candidacy for RFA          Discussed procedure risks/benefits in detail with patient. Pt meets medical necessity for procedure due to failure of conservative measures. Reviewed procedural risks including bleeding, infection, nerve damage, paralysis. Also reviewed mitigating factors such as screening for infection/blood thinner use, sterile precautions, and image-guidance when applicable. All questions answered. Pt/guardian expressed understanding and choose to proceed                Ezequiel Hernandez MD  Anesthesiologist & Interventional Pain Physician   [facility]  O: [phone]  F: [phone]  10:20 AM  10/04/24

## 2024-10-07 PROBLEM — M47.816 LUMBAR SPONDYLOSIS: Status: ACTIVE | Noted: 2024-10-07

## 2024-10-07 PROBLEM — Z98.890 HISTORY OF KYPHOPLASTY: Status: ACTIVE | Noted: 2024-10-07

## 2024-10-11 ENCOUNTER — TELEPHONE (OUTPATIENT)
Dept: PRIMARY CARE | Facility: CLINIC | Age: 89
End: 2024-10-11
Payer: MEDICARE

## 2024-10-14 ENCOUNTER — OFFICE VISIT (OUTPATIENT)
Dept: PRIMARY CARE | Facility: CLINIC | Age: 89
End: 2024-10-14
Payer: MEDICARE

## 2024-10-14 VITALS
DIASTOLIC BLOOD PRESSURE: 80 MMHG | SYSTOLIC BLOOD PRESSURE: 144 MMHG | BODY MASS INDEX: 22.16 KG/M2 | TEMPERATURE: 97.1 F | RESPIRATION RATE: 18 BRPM | WEIGHT: 133 LBS | OXYGEN SATURATION: 99 % | HEIGHT: 65 IN | HEART RATE: 76 BPM

## 2024-10-14 DIAGNOSIS — M47.816 LUMBAR SPONDYLOSIS: ICD-10-CM

## 2024-10-14 DIAGNOSIS — I10 HTN (HYPERTENSION), BENIGN: Primary | ICD-10-CM

## 2024-10-14 DIAGNOSIS — K21.9 CHRONIC GERD: ICD-10-CM

## 2024-10-14 DIAGNOSIS — I48.0 PAROXYSMAL ATRIAL FIBRILLATION (MULTI): ICD-10-CM

## 2024-10-14 DIAGNOSIS — F41.1 GENERALIZED ANXIETY DISORDER: ICD-10-CM

## 2024-10-14 PROCEDURE — 1160F RVW MEDS BY RX/DR IN RCRD: CPT | Performed by: NURSE PRACTITIONER

## 2024-10-14 PROCEDURE — 3079F DIAST BP 80-89 MM HG: CPT | Performed by: NURSE PRACTITIONER

## 2024-10-14 PROCEDURE — 3077F SYST BP >= 140 MM HG: CPT | Performed by: NURSE PRACTITIONER

## 2024-10-14 PROCEDURE — 1123F ACP DISCUSS/DSCN MKR DOCD: CPT | Performed by: NURSE PRACTITIONER

## 2024-10-14 PROCEDURE — 1159F MED LIST DOCD IN RCRD: CPT | Performed by: NURSE PRACTITIONER

## 2024-10-14 PROCEDURE — 1125F AMNT PAIN NOTED PAIN PRSNT: CPT | Performed by: NURSE PRACTITIONER

## 2024-10-14 PROCEDURE — 99349 HOME/RES VST EST MOD MDM 40: CPT | Performed by: NURSE PRACTITIONER

## 2024-10-14 RX ORDER — LOPERAMIDE HYDROCHLORIDE 2 MG/1
CAPSULE ORAL
COMMUNITY
Start: 2024-09-15

## 2024-10-14 ASSESSMENT — PAIN SCALES - GENERAL: PAINLEVEL: 2

## 2024-10-14 NOTE — PROGRESS NOTES
Subjective   Patient ID: Stephanie Jimenez is a 88 y.o. female who presents for Follow-up (Routine 3 month follow up).    Visit for 87 y/o female seen today at [facility] for routine 3 month follow up of chronic medical conditions. Pt is sitting on edge of bed this morning. She is alert, able to answer most questions regarding her health. Pt is able to do her own dressing, toileting and showering but does require assistance with medication management and meal preparation. Pt reports using a walker when leaving her room. She does not use assistive device inside her apartment. She denies recent fall or injury. Pt is no longer working with therapy services. She continues to endorse chronic back pain but does admit that it is much improved. She remains active with pain management and is scheduled for an injection on 10/17. Pt denies appetite changes or signs of weight loss. Denies abdominal pain, nausea, vomiting. Pt endorses occasional constipation but denies any bowel or bladder concerns today. She denies difficulty sleeping. Pt with HTN. She is not followed by cardiology. Denies any issues with high BP readings. Does report taking her medications 5 minutes prior to my arrival today. Pt endorses intermittent anxiety, managed with routine Mirtazapine and Xanax as needed. Pt denies any acute concerns today. She has not had any recent hospitalizations.          Current Outpatient Medications:     loperamide (Imodium) 2 mg capsule, , Disp: , Rfl:     acetaminophen (Tylenol 8 HOUR) 650 mg ER tablet, Take 2 tablets (1,300 mg) by mouth every 8 hours if needed for mild pain (1 - 3), moderate pain (4 - 6) or headaches., Disp: , Rfl:     ALPRAZolam (Xanax) 0.25 mg tablet, Take 1 tablet (0.25 mg) by mouth once daily as needed for anxiety., Disp: 30 tablet, Rfl: 5    amLODIPine (Norvasc) 10 mg tablet, Take 1 tablet (10 mg) by mouth once daily., Disp: , Rfl:     atorvastatin (Lipitor) 10 mg tablet, Take 1  tablet (10 mg) by mouth once daily., Disp: , Rfl:     calcium carbonate-vitamin D3 (Os-Jory 500 + D3) 500 mg-5 mcg (200 unit) tablet, Take 1 tablet by mouth once daily., Disp: , Rfl:     folic acid/multivit-min/lutein (CENTRUM SILVER ORAL), Take 1 tablet by mouth once daily., Disp: , Rfl:     lidocaine (Lidoderm) 5 % patch, Place 1 patch on the skin once daily. Remove after 12 hours, Disp: , Rfl:     metoprolol succinate XL (Toprol-XL) 50 mg 24 hr tablet, take 1 tablet by mouth once daily, Disp: 30 tablet, Rfl: 5    mirtazapine (Remeron) 15 mg tablet, TAKE ONE TABLET BY MOUTH AT BEDTIME ONCE A DAY, Disp: 30 tablet, Rfl: 0    omeprazole (PriLOSEC) 20 mg DR capsule, Take 1 capsule (20 mg) by mouth once daily in the morning. Take before meals. 30 minutes prior, Disp: , Rfl:     tiZANidine (Zanaflex) 4 mg capsule, Take 1 capsule (4 mg) by mouth 3 times a day as needed for muscle spasms (pain) for up to 20 days., Disp: 30 capsule, Rfl: 1     Review of Systems  Constitutional: Negative for appetite change, chills, fatigue and fever.   HENT:  Negative for dental problem and trouble swallowing.    Eyes:  Negative for pain and visual disturbance.   Respiratory:  Negative for cough, shortness of breath and wheezing.    Cardiovascular:  Negative for chest pain, palpitations, edema.   Gastrointestinal: Positive for occasional constipation. Negative for abdominal distention, abdominal pain, diarrhea, nausea and vomiting.   Endocrine: Negative for cold intolerance and heat intolerance.   Genitourinary:  Negative for difficulty urinating.   Musculoskeletal: Positive for chronic back pain, improving, unsteady gait.  Skin:  Negative for color change, pallor, rash and wound.   Allergic/Immunologic: Negative for environmental allergies and food allergies.   Neurological:  Negative for dizziness, seizures, weakness, numbness and headaches.   Hematological:  Negative for adenopathy. Does not bruise/bleed easily.   Psychiatric/Behavioral:  "Positive for anxiety. Negative for behavioral problems.     Objective   /80 (BP Location: Left arm, Patient Position: Sitting, BP Cuff Size: Adult)   Pulse 76   Temp 36.2 °C (97.1 °F) (Temporal)   Resp 18   Ht 1.651 m (5' 5\")   Wt 60.3 kg (133 lb)   SpO2 99%   BMI 22.13 kg/m²     Physical Exam  Constitutional:       General: She is awake.      Appearance: Normal appearance. She is well-developed.      Comments: Alert, sitting on edge of bed, no acute distress  HENT:      Nose: Nose normal.      Mouth/Throat:      Mouth: Mucous membranes are moist.   Eyes:      Extraocular Movements: Extraocular movements intact.      Pupils: Pupils are equal, round, and reactive to light.   Cardiovascular:      Rate and Rhythm: Normal rate and regular rhythm.      No edema.   Pulmonary:      Effort: Pulmonary effort is normal.      Breath sounds: Normal breath sounds.   Chest:      Chest wall: No tenderness.   Abdominal:      General: Abdomen is flat.      Palpations: Abdomen is soft. There is no mass.      Tenderness: There is no abdominal tenderness. There is no guarding.   Musculoskeletal:      Cervical back: Neck supple.   Neurological:      Mental Status: She is alert and oriented to person, place, and time.      Cranial Nerves: Cranial nerves 2-12 are intact.   Psychiatric:         Mood and Affect: Mood is anxious        Behavior: Behavior is cooperative.     Lab Results   Component Value Date    WBC 9.2 07/01/2024    HGB 13.0 07/01/2024    HCT 39.8 07/01/2024    MCV 93 07/01/2024     07/01/2024       Chemistry    Lab Results   Component Value Date/Time     (L) 07/01/2024 1121    K 4.5 07/01/2024 1121    CL 95 (L) 07/01/2024 1121    CO2 25 07/01/2024 1121    BUN 11 07/01/2024 1121    CREATININE 0.70 07/01/2024 1121    Lab Results   Component Value Date/Time    CALCIUM 9.2 07/01/2024 1121    ALKPHOS 121 04/01/2024 1056    AST 30 04/01/2024 1056    ALT 16 04/01/2024 1056    BILITOT 0.4 04/01/2024 1056 "         Assessment/Plan   Diagnoses and all orders for this visit:  HTN (hypertension), benign  -chronic, BP slightly elevated on exam today, pt reports taking morning medications 5 minutes ago, asymptomatic   -continue Amlodipine, Metoprolol    Paroxysmal atrial fibrillation (Multi)  -chronic, stable, HR regular, rate controlled   -continue Metoprolol    Chronic GERD  -chronic, stable, no current GI concerns   -continue Omeprazole     Lumbar spondylosis  -chronic, followed by pain management   -has appt for injection scheduled 10/17   -continue Tylenol, Lidocaine patch, Tizanidine as needed    Generalized anxiety disorder  -chronic, mood stable one exam  -does endorse intermittent anxiety   -continue Mirtazapine, Alprazolam     Patient stable. Seems to be doing well overall. Chronic back pain improving. Advised patient to contact house calls office with any acute concerns or medication needs.          Alisha Torrez, YUNIOR-CNP

## 2024-10-17 ENCOUNTER — HOSPITAL ENCOUNTER (OUTPATIENT)
Dept: GASTROENTEROLOGY | Facility: HOSPITAL | Age: 89
Discharge: HOME | End: 2024-10-17
Payer: MEDICARE

## 2024-10-17 VITALS
RESPIRATION RATE: 18 BRPM | WEIGHT: 133 LBS | HEIGHT: 65 IN | BODY MASS INDEX: 22.16 KG/M2 | DIASTOLIC BLOOD PRESSURE: 62 MMHG | TEMPERATURE: 97.7 F | SYSTOLIC BLOOD PRESSURE: 148 MMHG | HEART RATE: 78 BPM | OXYGEN SATURATION: 100 %

## 2024-10-17 DIAGNOSIS — M47.816 LUMBAR SPONDYLOSIS: ICD-10-CM

## 2024-10-17 PROCEDURE — 2500000004 HC RX 250 GENERAL PHARMACY W/ HCPCS (ALT 636 FOR OP/ED): Performed by: ANESTHESIOLOGY

## 2024-10-17 PROCEDURE — 64494 INJ PARAVERT F JNT L/S 2 LEV: CPT | Performed by: ANESTHESIOLOGY

## 2024-10-17 PROCEDURE — 64493 INJ PARAVERT F JNT L/S 1 LEV: CPT | Mod: 50 | Performed by: ANESTHESIOLOGY

## 2024-10-17 RX ORDER — BUPIVACAINE HYDROCHLORIDE 5 MG/ML
INJECTION, SOLUTION PERINEURAL AS NEEDED
Status: COMPLETED | OUTPATIENT
Start: 2024-10-17 | End: 2024-10-17

## 2024-10-17 ASSESSMENT — ENCOUNTER SYMPTOMS
OCCASIONAL FEELINGS OF UNSTEADINESS: 1
DEPRESSION: 1
LOSS OF SENSATION IN FEET: 1

## 2024-10-17 ASSESSMENT — PAIN - FUNCTIONAL ASSESSMENT
PAIN_FUNCTIONAL_ASSESSMENT: 0-10
PAIN_FUNCTIONAL_ASSESSMENT: 0-10

## 2024-10-17 ASSESSMENT — COLUMBIA-SUICIDE SEVERITY RATING SCALE - C-SSRS
2. HAVE YOU ACTUALLY HAD ANY THOUGHTS OF KILLING YOURSELF?: NO
6. HAVE YOU EVER DONE ANYTHING, STARTED TO DO ANYTHING, OR PREPARED TO DO ANYTHING TO END YOUR LIFE?: NO
1. IN THE PAST MONTH, HAVE YOU WISHED YOU WERE DEAD OR WISHED YOU COULD GO TO SLEEP AND NOT WAKE UP?: NO

## 2024-10-17 ASSESSMENT — PAIN SCALES - GENERAL
PAINLEVEL_OUTOF10: 6
PAINLEVEL_OUTOF10: 3

## 2024-10-17 ASSESSMENT — PAIN DESCRIPTION - DESCRIPTORS: DESCRIPTORS: ACHING

## 2024-10-17 NOTE — INTERVAL H&P NOTE
H&P reviewed. The patient was examined and there are no changes to the H&P.  Stephanie Jimenez is a 88 y.o. female with PMH HTN, GERD, PAF, dementia, SCC s/p Mohs who presents for prognostic medial branch nerve blocks of BL L4-5, L5-S1 facets with local anesthetic only under fluoroscopic guidance to assess candidacy for RFA. Patient's pain stable and persistent from last visit.  Denies allergies to Latex, steroids, local anesthetics, or iodine/contrast. Denies being on blood thinners. Not diabetic.  Denies fever, chills, NS, CP, SOB, cough, N/V.    Discussed procedure risks/benefits in detail with patient. Pt meets medical necessity for procedure due to failure of conservative measures. Reviewed procedural risks including bleeding, infection, nerve damage, paralysis. Also reviewed mitigating factors such as screening for infection/blood thinner use, sterile precautions, and image-guidance when applicable. All questions answered. Pt/guardian expressed understanding and choose to proceed        Ezequiel Hernandez MD  Anesthesiologist & Interventional Pain Physician   [facility]  O: [phone]  F: [phone]  10:52 AM  10/17/24

## 2024-10-17 NOTE — NURSING NOTE
Pt arrived in stable condition, denies any neuro deficits, bandaid dry and intact. Discharge instructions reviewed.

## 2024-10-17 NOTE — DISCHARGE INSTRUCTIONS
DISCHARGE INSTRUCTIONS FOR INJECTIONS     You underwent bilateral lumbar medial branch nerve blocks today    Aftermost injections, it is recommended that you relax and limit your activity for the remainder of the day unless you have been told otherwise by your pain physician.  You should not drive a car, operate machinery, or make important legal decisions unless otherwise directed by your pain physician.  You may resume your normal activity, including exercise, tomorrow.      Keep a written pain diary of how much pain relief you experienced following the injection procedure and the length of time of pain relief you experienced pain relief. Following diagnostic injections like medial branch nerve blocks, sacroiliac joint blocks, stellate ganglion injections and other blocks, it is very important you record the specific amount of pain relief you experienced immediately after the injectionand how long it lasted. Your doctor will ask you for this information at your follow up visit.     For all injections, please keep the injection site dry and inspect the site for a couple of days. You may remove the Band-Aid the day of the injection at any time.     Some discomfort, bruising or slight swelling may occur at the injection site. This is not abnormal if it occurs.  If needed you may:    -Take over the counter medication such as Tylenol or Motrin.   -Apply an ice pack for 30 minutes, 2 to 3 times a day for the first 24 hours.     You may shower today; no soaking baths, hot tubs, whirlpools or swimming pools for two days.      If you are given steroids in your injection, it may take 3-5 days for the steroid medication to take effect. You may notice a worsening of your symptoms for 1-2 days after the injection. This is not abnormal.  You may use acetaminophen, ibuprofen, or prescription medication that your doctor may have prescribed for you if you need to do so.     A few common side effects of steroids include facial  flushing, sweating, restlessness, irritability,difficulty sleeping, increase in blood sugar, and increased blood pressure. If you have diabetes, please monitor your blood sugar at least once a day for at least 5 days. If you have poorly controlled high blood pressure, monitoryour blood pressure for at least 2 days and contact your primary care physician if these numbers are unusually high for you.      If you take aspirin or non-steroidal anti-inflammatory drugs (examples are Motrin, Advil, ibuprofen, Naprosyn, Voltaren, Relafen, etc.) you may restart these this evening, but stop taking it 3 days before your next appointment, unless instructed otherwiseby your physician.      You do not need to discontinue non-aspirin-containing pain medications prior to an injection (examples: Celebrex, tramadol, hydrocodone and acetaminophen).      If you take a blood thinning medication (Coumadin, Lovenox, Fragmin,Ticlid, Plavix, Pradaxa, etc.), please discuss this with your primary care physician/cardiologist and your pain physician. These medications MUST be discontinued before you can have an injection safely, without the risk of uncontrolled bleeding. If these medications are not discontinued for an appropriate period of time, you will not be able to receivean injection.      If you are taking Coumadin, please have your INR checked the morning of your procedure and bringthe result to your appointment unless otherwise instructed. If your INR is over 1.2, your injection may need to be rescheduled to avoid uncontrolled bleeding from the needle placement.     Call [facility] Pain Management at [phone] between 8am-4pm Monday - Friday if you are experiencing the following:    If you received an epidural or spinal injection:    -Headache that doesnot go away with medicine, is worse when sitting or standing up, and is greatly relieved upon lying down.   -Severe pain worse than or different than your baseline pain.   -Chills  or fever (101º F or greater).   -Drainage or signs of infection at the injection site     Go directly to the Emergency Department if you are experiencing the following and received an epidural or spinal injection:   -Abrupt weakness or progressive weakness in your legs that starts after you leave the clinic.   -Abrupt severe or worsening numbness in your legs.   -Inability to urinate after the injection or loss of bowel or bladder control without the urge to defecate or urinate.     If you have a clinical question that cannot wait until your next appointment, please call [phone] between 8am-4pm Monday - Friday or send a [employer] message. We do our best to return all non-emergency messages within 24 hours, Monday - Friday. A nurse or physician will return your message.      If you need to cancel an appointment, please call the scheduling staff at [phone] during normal business hours or leave a message at least 24 hours in advance.     If you are going to be sedated for your next procedure, you MUST have responsible adult who can legally drive accompany you home. You cannot eat or drink for eight hours prior to the planned procedure if you are going to receive sedation. You may take your non-blood thinning medications with a small sip of water.

## 2024-10-24 ENCOUNTER — OFFICE VISIT (OUTPATIENT)
Dept: PAIN MEDICINE | Facility: CLINIC | Age: 89
End: 2024-10-24
Payer: MEDICARE

## 2024-10-24 VITALS
SYSTOLIC BLOOD PRESSURE: 140 MMHG | DIASTOLIC BLOOD PRESSURE: 69 MMHG | BODY MASS INDEX: 22.16 KG/M2 | WEIGHT: 133 LBS | HEIGHT: 65 IN | HEART RATE: 78 BPM

## 2024-10-24 DIAGNOSIS — M47.816 LUMBAR SPONDYLOSIS: Primary | ICD-10-CM

## 2024-10-24 PROCEDURE — 3077F SYST BP >= 140 MM HG: CPT | Performed by: NURSE PRACTITIONER

## 2024-10-24 PROCEDURE — 1159F MED LIST DOCD IN RCRD: CPT | Performed by: NURSE PRACTITIONER

## 2024-10-24 PROCEDURE — 99213 OFFICE O/P EST LOW 20 MIN: CPT | Performed by: NURSE PRACTITIONER

## 2024-10-24 PROCEDURE — 1123F ACP DISCUSS/DSCN MKR DOCD: CPT | Performed by: NURSE PRACTITIONER

## 2024-10-24 PROCEDURE — 3078F DIAST BP <80 MM HG: CPT | Performed by: NURSE PRACTITIONER

## 2024-10-24 PROCEDURE — 1036F TOBACCO NON-USER: CPT | Performed by: NURSE PRACTITIONER

## 2024-10-24 PROCEDURE — 1125F AMNT PAIN NOTED PAIN PRSNT: CPT | Performed by: NURSE PRACTITIONER

## 2024-10-24 ASSESSMENT — PATIENT HEALTH QUESTIONNAIRE - PHQ9
2. FEELING DOWN, DEPRESSED OR HOPELESS: NOT AT ALL
SUM OF ALL RESPONSES TO PHQ9 QUESTIONS 1 AND 2: 0
1. LITTLE INTEREST OR PLEASURE IN DOING THINGS: NOT AT ALL

## 2024-10-24 ASSESSMENT — PAIN SCALES - GENERAL
PAINLEVEL_OUTOF10: 2
PAINLEVEL_OUTOF10: 2

## 2024-10-24 ASSESSMENT — PAIN DESCRIPTION - DESCRIPTORS: DESCRIPTORS: ACHING

## 2024-10-24 ASSESSMENT — PAIN - FUNCTIONAL ASSESSMENT: PAIN_FUNCTIONAL_ASSESSMENT: 0-10

## 2024-10-24 NOTE — PROGRESS NOTES
Subjective   Patient ID: Stephanie Jimenez is a 88 y.o. female who presents for Follow-up (Post LMBB).    HPI 88-year-old female with lumbar spondylosis presents for a follow-up to her first diagnostic bilateral L4-5, L5-S1 medial branch block with Dr. Hernandez on 10/17/2024.  He reports at least 80% improvement in pain for the first 2 to 3 hours post-procedure.  She is pleased with these results and would like to proceed with scheduling the second diagnostic medial branch block in anticipation of RFA.    Review of Systems Unless noted in the HPI all other systems have been reviewed and are negative for complaint.     Objective   Physical Exam  General- No acute distress, well appearing and well nourished.   Eyes Conjunctiva and lids: No erythema, swelling or discharge  Neck - Supple, no cervical lymphadenopathy.   Pulmonary - Respiratory effort: Normal respiration.   Cardiovascular - Normal rate and rhythm.  Examination of extremities for edema and/or varicosities: No peripheral edema  Abdomen: Soft, Non-tender, non-distended, no abdominal masses.   Musculoskeletal - Range of motion: decreased ROM to the thoracic and lumbar spines. Decreased ROM to the BLE.   Skin - Skin and subcutaneous tissue: one small puncture site with steri-strip. CDI. No s/sx of infection.   Neurologic - Reflexes: Normal. Coordination: Antalgic gait; use of cane for ambulation.    Psychiatric - Orientation to person, place, and time: Normal. Mood and affect: Normal.    Assessment/Plan   Assessment & Plan  Lumbar spondylosis    Orders:    [place] pain management; Future     Medial Nerve Branch Block; Future    TREATMENT PLAN:  I had a nice discussion with the patient today and our plan will be as follows:  Radiology: No new imaging to review at this time.   Physically: Encouraged patient to continue with increased physical activity as able.   Psychologically: No acute psychological needs at this time.    Medication: Continue medications as  previously prescribed.  Duration: Chronic/ongoing.   Intervention: Patient is s/p her first diagnostic bilateral L4-5, L5-S1 medial branch block with Dr. Hernandez on 10/17/2024 with a reported 80% relief in pain post-procedure.  Due to this she would like for me to proceed with ordering the second diagnostic bilateral L4-5, L5-S1 medial branch block to be performed under fluoroscopic guidance with Dr. Hernandez.

## 2024-11-07 ENCOUNTER — HOSPITAL ENCOUNTER (OUTPATIENT)
Dept: GASTROENTEROLOGY | Facility: HOSPITAL | Age: 89
Discharge: HOME | End: 2024-11-07
Payer: MEDICARE

## 2024-11-07 VITALS
WEIGHT: 133 LBS | SYSTOLIC BLOOD PRESSURE: 146 MMHG | DIASTOLIC BLOOD PRESSURE: 59 MMHG | OXYGEN SATURATION: 99 % | BODY MASS INDEX: 22.16 KG/M2 | HEIGHT: 65 IN | HEART RATE: 100 BPM | TEMPERATURE: 96.8 F | RESPIRATION RATE: 16 BRPM

## 2024-11-07 DIAGNOSIS — M47.816 LUMBAR SPONDYLOSIS: ICD-10-CM

## 2024-11-07 PROCEDURE — 2500000004 HC RX 250 GENERAL PHARMACY W/ HCPCS (ALT 636 FOR OP/ED): Performed by: ANESTHESIOLOGY

## 2024-11-07 PROCEDURE — 64493 INJ PARAVERT F JNT L/S 1 LEV: CPT | Mod: 50 | Performed by: ANESTHESIOLOGY

## 2024-11-07 PROCEDURE — 64494 INJ PARAVERT F JNT L/S 2 LEV: CPT | Performed by: ANESTHESIOLOGY

## 2024-11-07 RX ORDER — BUPIVACAINE HYDROCHLORIDE 5 MG/ML
INJECTION, SOLUTION PERINEURAL AS NEEDED
Status: COMPLETED | OUTPATIENT
Start: 2024-11-07 | End: 2024-11-07

## 2024-11-07 ASSESSMENT — PAIN - FUNCTIONAL ASSESSMENT
PAIN_FUNCTIONAL_ASSESSMENT: 0-10
PAIN_FUNCTIONAL_ASSESSMENT: 0-10

## 2024-11-07 ASSESSMENT — PAIN SCALES - GENERAL
PAINLEVEL_OUTOF10: 7
PAINLEVEL_OUTOF10: 10 - WORST POSSIBLE PAIN

## 2024-11-07 ASSESSMENT — ENCOUNTER SYMPTOMS
LOSS OF SENSATION IN FEET: 0
OCCASIONAL FEELINGS OF UNSTEADINESS: 1
DEPRESSION: 1

## 2024-11-07 ASSESSMENT — PAIN DESCRIPTION - DESCRIPTORS: DESCRIPTORS: ACHING

## 2024-11-07 ASSESSMENT — COLUMBIA-SUICIDE SEVERITY RATING SCALE - C-SSRS
1. IN THE PAST MONTH, HAVE YOU WISHED YOU WERE DEAD OR WISHED YOU COULD GO TO SLEEP AND NOT WAKE UP?: NO
6. HAVE YOU EVER DONE ANYTHING, STARTED TO DO ANYTHING, OR PREPARED TO DO ANYTHING TO END YOUR LIFE?: NO
2. HAVE YOU ACTUALLY HAD ANY THOUGHTS OF KILLING YOURSELF?: NO

## 2024-11-07 NOTE — POST-PROCEDURE NOTE
Discharge instructions reviewed. All questions answered. Patient felt dizzy upon standing. Returned to seated position and given cookies.    1108 discharge via wheelchair. Taken out by CARMEN barfield

## 2024-11-07 NOTE — INTERVAL H&P NOTE
H&P reviewed. The patient was examined and there are no changes to the H&P.  Stephanie Jimenez is a 88 y.o. female with PMH HTN, GERD, PAF, dementia, SCC s/p Mohs who presents for prognostic medial branch nerve blocks of BL L4-5, L5-S1 facets with local anesthetic only under fluoroscopic guidance to assess candidacy for RFA. Patient's pain stable and persistent from last visit.  Denies allergies to Latex, steroids, local anesthetics, or iodine/contrast. Denies being on blood thinners. Not diabetic.  Denies fever, chills, NS, CP, SOB, cough, N/V.     Discussed procedure risks/benefits in detail with patient. Pt meets medical necessity for procedure due to failure of conservative measures. Reviewed procedural risks including bleeding, infection, nerve damage, paralysis. Also reviewed mitigating factors such as screening for infection/blood thinner use, sterile precautions, and image-guidance when applicable. All questions answered. Pt/guardian expressed understanding and choose to proceed      Ezequiel Hernandez MD  Anesthesiologist & Interventional Pain Physician   [facility]  O: [phone]  F: [phone]  9:59 AM  11/07/24

## 2024-11-07 NOTE — DISCHARGE INSTRUCTIONS
DISCHARGE INSTRUCTIONS FOR INJECTIONS     You underwent lumbar medial branch nerve blocks today    Aftermost injections, it is recommended that you relax and limit your activity for the remainder of the day unless you have been told otherwise by your pain physician.  You should not drive a car, operate machinery, or make important legal decisions unless otherwise directed by your pain physician.  You may resume your normal activity, including exercise, tomorrow.      Keep a written pain diary of how much pain relief you experienced following the injection procedure and the length of time of pain relief you experienced pain relief. Following diagnostic injections like medial branch nerve blocks, sacroiliac joint blocks, stellate ganglion injections and other blocks, it is very important you record the specific amount of pain relief you experienced immediately after the injectionand how long it lasted. Your doctor will ask you for this information at your follow up visit.     For all injections, please keep the injection site dry and inspect the site for a couple of days. You may remove the Band-Aid the day of the injection at any time.     Some discomfort, bruising or slight swelling may occur at the injection site. This is not abnormal if it occurs.  If needed you may:    -Take over the counter medication such as Tylenol or Motrin.   -Apply an ice pack for 30 minutes, 2 to 3 times a day for the first 24 hours.     You may shower today; no soaking baths, hot tubs, whirlpools or swimming pools for two days.      If you are given steroids in your injection, it may take 3-5 days for the steroid medication to take effect. You may notice a worsening of your symptoms for 1-2 days after the injection. This is not abnormal.  You may use acetaminophen, ibuprofen, or prescription medication that your doctor may have prescribed for you if you need to do so.     A few common side effects of steroids include facial flushing,  sweating, restlessness, irritability,difficulty sleeping, increase in blood sugar, and increased blood pressure. If you have diabetes, please monitor your blood sugar at least once a day for at least 5 days. If you have poorly controlled high blood pressure, monitoryour blood pressure for at least 2 days and contact your primary care physician if these numbers are unusually high for you.      If you take aspirin or non-steroidal anti-inflammatory drugs (examples are Motrin, Advil, ibuprofen, Naprosyn, Voltaren, Relafen, etc.) you may restart these this evening, but stop taking it 3 days before your next appointment, unless instructed otherwiseby your physician.      You do not need to discontinue non-aspirin-containing pain medications prior to an injection (examples: Celebrex, tramadol, hydrocodone and acetaminophen).      If you take a blood thinning medication (Coumadin, Lovenox, Fragmin,Ticlid, Plavix, Pradaxa, etc.), please discuss this with your primary care physician/cardiologist and your pain physician. These medications MUST be discontinued before you can have an injection safely, without the risk of uncontrolled bleeding. If these medications are not discontinued for an appropriate period of time, you will not be able to receivean injection.      If you are taking Coumadin, please have your INR checked the morning of your procedure and bringthe result to your appointment unless otherwise instructed. If your INR is over 1.2, your injection may need to be rescheduled to avoid uncontrolled bleeding from the needle placement.     Call [facility] at [phone] between 8am-4pm Monday - Friday if you are experiencing the following:    If you received an epidural or spinal injection:    -Headache that doesnot go away with medicine, is worse when sitting or standing up, and is greatly relieved upon lying down.   -Severe pain worse than or different than your baseline pain.   -Chills or fever  (101º F or greater).   -Drainage or signs of infection at the injection site     Go directly to the Emergency Department if you are experiencing the following and received an epidural or spinal injection:   -Abrupt weakness or progressive weakness in your legs that starts after you leave the clinic.   -Abrupt severe or worsening numbness in your legs.   -Inability to urinate after the injection or loss of bowel or bladder control without the urge to defecate or urinate.     If you have a clinical question that cannot wait until your next appointment, please call [phone] between 8am-4pm Monday - Friday or send a [employer] message. We do our best to return all non-emergency messages within 24 hours, Monday - Friday. A nurse or physician will return your message.      If you need to cancel an appointment, please call the scheduling staff at [phone] during normal business hours or leave a message at least 24 hours in advance.     If you are going to be sedated for your next procedure, you MUST have responsible adult who can legally drive accompany you home. You cannot eat or drink for eight hours prior to the planned procedure if you are going to receive sedation. You may take your non-blood thinning medications with a small sip of water.

## 2024-11-12 ENCOUNTER — APPOINTMENT (OUTPATIENT)
Dept: PAIN MEDICINE | Facility: CLINIC | Age: 89
End: 2024-11-12
Payer: MEDICARE

## 2024-11-18 ENCOUNTER — OFFICE VISIT (OUTPATIENT)
Dept: PAIN MEDICINE | Facility: CLINIC | Age: 89
End: 2024-11-18
Payer: MEDICARE

## 2024-11-18 VITALS
SYSTOLIC BLOOD PRESSURE: 152 MMHG | DIASTOLIC BLOOD PRESSURE: 73 MMHG | BODY MASS INDEX: 22.16 KG/M2 | HEART RATE: 82 BPM | HEIGHT: 65 IN | WEIGHT: 133 LBS | OXYGEN SATURATION: 97 %

## 2024-11-18 DIAGNOSIS — M79.18 DIFFUSE MYOFASCIAL PAIN SYNDROME: ICD-10-CM

## 2024-11-18 DIAGNOSIS — Z98.890 S/P KYPHOPLASTY: Primary | ICD-10-CM

## 2024-11-18 DIAGNOSIS — M47.816 LUMBAR SPONDYLOSIS: ICD-10-CM

## 2024-11-18 PROCEDURE — 99213 OFFICE O/P EST LOW 20 MIN: CPT | Performed by: ANESTHESIOLOGY

## 2024-11-18 PROCEDURE — 1125F AMNT PAIN NOTED PAIN PRSNT: CPT | Performed by: ANESTHESIOLOGY

## 2024-11-18 PROCEDURE — 99417 PROLNG OP E/M EACH 15 MIN: CPT | Performed by: ANESTHESIOLOGY

## 2024-11-18 PROCEDURE — 3077F SYST BP >= 140 MM HG: CPT | Performed by: ANESTHESIOLOGY

## 2024-11-18 PROCEDURE — 1159F MED LIST DOCD IN RCRD: CPT | Performed by: ANESTHESIOLOGY

## 2024-11-18 PROCEDURE — 1160F RVW MEDS BY RX/DR IN RCRD: CPT | Performed by: ANESTHESIOLOGY

## 2024-11-18 PROCEDURE — 1123F ACP DISCUSS/DSCN MKR DOCD: CPT | Performed by: ANESTHESIOLOGY

## 2024-11-18 PROCEDURE — 1036F TOBACCO NON-USER: CPT | Performed by: ANESTHESIOLOGY

## 2024-11-18 PROCEDURE — 3078F DIAST BP <80 MM HG: CPT | Performed by: ANESTHESIOLOGY

## 2024-11-18 ASSESSMENT — ENCOUNTER SYMPTOMS
BACK PAIN: 1
ENDOCRINE NEGATIVE: 1
EYES NEGATIVE: 1
RESPIRATORY NEGATIVE: 1
GASTROINTESTINAL NEGATIVE: 1
NEUROLOGICAL NEGATIVE: 1
PSYCHIATRIC NEGATIVE: 1
CONSTITUTIONAL NEGATIVE: 1
HEMATOLOGIC/LYMPHATIC NEGATIVE: 1
CARDIOVASCULAR NEGATIVE: 1

## 2024-11-18 ASSESSMENT — PAIN SCALES - GENERAL
PAINLEVEL_OUTOF10: 8
PAINLEVEL_OUTOF10: 8

## 2024-11-18 ASSESSMENT — PAIN DESCRIPTION - DESCRIPTORS: DESCRIPTORS: ACHING

## 2024-11-18 ASSESSMENT — PAIN - FUNCTIONAL ASSESSMENT: PAIN_FUNCTIONAL_ASSESSMENT: 0-10

## 2024-11-18 NOTE — PROGRESS NOTES
PAIN MANAGEMENT FOLLOW-UP OFFICE NOTE    Date of Service: 11/18/2024    SUBJECTIVE    CHIEF COMPLAINT: LBP    HISTORY OF PRESENT ILLNESS    Stephanie Jimenez is a 88 y.o. female with PMH HTN, GERD, PAF, dementia, SCC s/p Mohs who presents for F/U LBP. Pt is a poor historian and her daughter is present to assist with hx.     On 11/7, pt underwent BL LMBNB #2 with 80% relief for 1 d. Since that time, pt complains of chronic LBP with standing/walking. Anticipates RFA    Pt denies new-onset numbness, weakness, bowel/bladder incontinence.  Pt denies recent infection, allergy to Latex/iodine/contrast. Patient is currently taking the following blood thinner(s): N/A    Procedure log:  -BL LMBNB #2 11/7/24: 80% relief  -BL LMBNB #1 10/17/24: 80% relief  -TPI/L VANITA CSI 8/29/24: 75% ongoing relief  -T11 kypho 7/16/24: 100%    REVIEW OF SYSTEMS  Review of Systems   Constitutional: Negative.    HENT: Negative.     Eyes: Negative.    Respiratory: Negative.     Cardiovascular: Negative.    Gastrointestinal: Negative.    Endocrine: Negative.    Musculoskeletal:  Positive for back pain.   Skin: Negative.    Neurological: Negative.    Hematological: Negative.    Psychiatric/Behavioral: Negative.         PAST MEDICAL HISTORY  Past Medical History:   Diagnosis Date    Actinic keratosis of scalp     Anxiety state     Asthma     Dr. Chau    Cystocele, unspecified     Gastritis     Hemorrhoids     Hiatal hernia     Hypercholesterolemia     Hypertension     Osteoporosis     halo fracture right humerus    Prediabetes     Sciatica      Past Surgical History:   Procedure Laterality Date    BIOPSY  2013    lip, noncancerous    BIOPSY Left 03/2021    left leg - apex derm    BIOPSY  03/2021    forehead - apex derm    BLADDER SUSPENSION  1979    COLONOSCOPY  2002    COLONOSCOPY  11/2015    CYSTOSCOPY  2004    with suburethral sling    ESOPHAGOGASTRODUODENOSCOPY      HIP SURGERY Right 06/2019    right hip/femur    TOTAL ABDOMINAL HYSTERECTOMY  W/ BILATERAL SALPINGOOPHORECTOMY  1977    URETHRAL SLING  2004    suburethral sling and cystoscopy     Family History   Problem Relation Name Age of Onset    Heart disease Mother      Lung cancer Father      Other (mouth cancer) Sister      Lung cancer Sister      Heart attack Sister      No Known Problems Sister      Lung cancer Brother      No Known Problems Daughter      No Known Problems Son         CURRENT MEDICATIONS  Current Outpatient Medications   Medication Sig Dispense Refill    acetaminophen (Tylenol 8 HOUR) 650 mg ER tablet Take 2 tablets (1,300 mg) by mouth every 8 hours if needed for mild pain (1 - 3), moderate pain (4 - 6) or headaches.      ALPRAZolam (Xanax) 0.25 mg tablet Take 1 tablet (0.25 mg) by mouth once daily as needed for anxiety. 30 tablet 5    amLODIPine (Norvasc) 10 mg tablet Take 1 tablet (10 mg) by mouth once daily.      atorvastatin (Lipitor) 10 mg tablet Take 1 tablet (10 mg) by mouth once daily.      calcium carbonate-vitamin D3 (Os-Jory 500 + D3) 500 mg-5 mcg (200 unit) tablet Take 1 tablet by mouth once daily.      folic acid/multivit-min/lutein (CENTRUM SILVER ORAL) Take 1 tablet by mouth once daily.      lidocaine (Lidoderm) 5 % patch Place 1 patch on the skin once daily. Remove after 12 hours      loperamide (Imodium) 2 mg capsule       metoprolol succinate XL (Toprol-XL) 50 mg 24 hr tablet take 1 tablet by mouth once daily 30 tablet 5    mirtazapine (Remeron) 15 mg tablet TAKE ONE TABLET BY MOUTH AT BEDTIME ONCE A DAY 30 tablet 0    omeprazole (PriLOSEC) 20 mg DR capsule Take 1 capsule (20 mg) by mouth once daily in the morning. Take before meals. 30 minutes prior      tiZANidine (Zanaflex) 4 mg capsule Take 1 capsule (4 mg) by mouth 3 times a day as needed for muscle spasms (pain) for up to 20 days. 30 capsule 1     No current facility-administered medications for this visit.       ALLERGIES AND DRUG REACTIONS  No Known Allergies         OBJECTIVE  Visit Vitals  /73  "  Pulse 82   Ht 1.651 m (5' 5\")   Wt 60.3 kg (133 lb)   SpO2 97%   BMI 22.13 kg/m²   OB Status Hysterectomy   Smoking Status Former   BSA 1.66 m²       Last Recorded Pain Score (if available):                Physical Exam  Vitals and nursing note reviewed.       General: Sitting in chair, NAD  Head: NCAT  Eyes: Sclera/conjunctiva clear, EOMI, PERRL  Nose/mouth: MMM  CV: Good distal pulses  Lungs: Good/equal chest excursion  Abdomen: Soft, ND  Ext: No cyanosis/edema  MSK: L-spine alignment: thoracic kyphosis, BL paraspinal m TTP,  ROM: +facet-loading BL    Neuro: AAOx3, CN Grossly nl  Dermatome sensation to light touch  LEFT L1 (lower pelvis/upper thigh): WNL    RIGHT L1: WNL      LEFT L2 (upper thigh): WNL       RIGHT: L2:WNL      LEFT L3 (medial knee): WNL       RIGHT L3: WNL      LEFT L4 (superior medial malleolus): WNL       RIGHT L4: WNL      LEFT L5 (dorsal foot): WNL       RIGHT L5: WNL      LEFT S1 (lateral foot): WNL     RIGHT S1: WNL      LEFT S2 (popliteal fossa): WNL    RIGHT S2: WNL        Motor strength  LEFT L2 (hip flexion): 5/5   RIGHT L2: 5/5  LEFT L3 (knee extension): 5/5     RIGHT L3: 5/5  LEFT L4 (dorsiflexion): 5/5     RIGHT L4: 5/5  LEFT L5 (EHL extension): 5/5     RIGHT L5: 5/5  LEFT S1 (plantarflexion): 5/5     RIGHT S1: 5/5  LEFT S2 (knee flexion): 5/5      RIGHT S2: 5/5      Psych: affect nl  Skin: no rash/lesions      REVIEW OF LABORATORY DATA  I have reviewed the following lab results:  WBC   Date Value Ref Range Status   07/01/2024 9.2 4.4 - 11.3 x10*3/uL Final     RBC   Date Value Ref Range Status   07/01/2024 4.28 4.00 - 5.20 x10*6/uL Final     Hemoglobin   Date Value Ref Range Status   07/01/2024 13.0 12.0 - 16.0 g/dL Final     Hematocrit   Date Value Ref Range Status   07/01/2024 39.8 36.0 - 46.0 % Final     MCV   Date Value Ref Range Status   07/01/2024 93 80 - 100 fL Final     MCH   Date Value Ref Range Status   07/01/2024 30.4 26.0 - 34.0 pg Final     MCHC   Date Value Ref Range " Status   07/01/2024 32.7 32.0 - 36.0 g/dL Final     RDW   Date Value Ref Range Status   07/01/2024 12.4 11.5 - 14.5 % Final     Platelets   Date Value Ref Range Status   07/01/2024 367 150 - 450 x10*3/uL Final     MPV   Date Value Ref Range Status   08/21/2023 10.4 7.0 - 12.6 CU Final     Sodium   Date Value Ref Range Status   07/01/2024 132 (L) 133 - 145 mmol/L Final     Potassium   Date Value Ref Range Status   07/01/2024 4.5 3.4 - 5.1 mmol/L Final     Bicarbonate   Date Value Ref Range Status   07/01/2024 25 24 - 31 mmol/L Final     Urea Nitrogen   Date Value Ref Range Status   07/01/2024 11 8 - 25 mg/dL Final     Calcium   Date Value Ref Range Status   07/01/2024 9.2 8.5 - 10.4 mg/dL Final     Protime   Date Value Ref Range Status   07/25/2022 11.9 9.8 - 13.4 sec Final     INR   Date Value Ref Range Status   07/25/2022 1.0 0.9 - 1.1 Final         REVIEW OF RADIOLOGY   I have reviewed the following:  Radiology Studies           XR L-spine 6/26/24:  Multiple vertebral compression fractures are present and have  worsened.      The previous T12 vertebral compression fracture has shown greater  degree of worsening now with a near vertebral plana appearance.      There is a new mild-to-moderate vertebral compression fracture at T11  with an approximate 40% anterior height loss.      New mild vertebral compression fractures at L1 and progression of the  L2 vertebral compression fracture from prior study.      Increased kyphosis across the lumbar spine      IMPRESSION:  Interval development of and progression of multiple vertebral  compression fractures with increasing kyphosis. If deemed clinically  appropriate, MRI can be considered for further assessment.       ASSESSMENT & PLAN  Stephanie Jimenez is a 88 y.o. female with PMH HTN, GERD, PAF, dementia, SCC s/p Mohs who presents for F/U LB pain    1) Vertebral compression fx, improved  -T11 kypho 7/16/24: 100% ongoing    2) Myofascial pain  -TPI/L VANITA CSI 8/29/24: 75%  ongoing relief  -Repeat PRN    3) Lumbar spondylosis  -Refractive to >4 mo tx including Tylenol, NSAIDs, tramadol, tizanidine, heating pad, >2 w PT, lido patch, [place]  -MRI T/L-spine 7/11/24: multilevel spondylosis  -BL LMBNB #1 10/17/24: 80% relief  -BL LMBNB #2 11/7/24: 80% relief  -Schedule LMBN RFA w/ IV sed of BL L4-5, L5-S1 facet joints to target pain generator as seen on imaging and minimize risk/likelihood of chronic opioid use and/or surgery            Discussed procedure risks/benefits in detail with patient. Pt meets medical necessity for procedure due to failure of conservative measures. Reviewed procedural risks including bleeding, infection, nerve damage, paralysis. Also reviewed mitigating factors such as screening for infection/blood thinner use, sterile precautions, and image-guidance when applicable. All questions answered. Pt/guardian expressed understanding and choose to proceed                Ezequiel Hernandez MD  Anesthesiologist & Interventional Pain Physician   [facility] [place]  O: [phone]  F: [phone]  11:17 AM  11/18/24

## 2024-11-18 NOTE — H&P (VIEW-ONLY)
PAIN MANAGEMENT FOLLOW-UP OFFICE NOTE    Date of Service: 11/18/2024    SUBJECTIVE    CHIEF COMPLAINT: LBP    HISTORY OF PRESENT ILLNESS    Stephanie Jimenez is a 88 y.o. female with PMH HTN, GERD, PAF, dementia, SCC s/p Mohs who presents for F/U LBP. Pt is a poor historian and her daughter is present to assist with hx.     On 11/7, pt underwent BL LMBNB #2 with 80% relief for 1 d. Since that time, pt complains of chronic LBP with standing/walking. Anticipates RFA    Pt denies new-onset numbness, weakness, bowel/bladder incontinence.  Pt denies recent infection, allergy to Latex/iodine/contrast. Patient is currently taking the following blood thinner(s): N/A    Procedure log:  -BL LMBNB #2 11/7/24: 80% relief  -BL LMBNB #1 10/17/24: 80% relief  -TPI/L VANITA CSI 8/29/24: 75% ongoing relief  -T11 kypho 7/16/24: 100%    REVIEW OF SYSTEMS  Review of Systems   Constitutional: Negative.    HENT: Negative.     Eyes: Negative.    Respiratory: Negative.     Cardiovascular: Negative.    Gastrointestinal: Negative.    Endocrine: Negative.    Musculoskeletal:  Positive for back pain.   Skin: Negative.    Neurological: Negative.    Hematological: Negative.    Psychiatric/Behavioral: Negative.         PAST MEDICAL HISTORY  Past Medical History:   Diagnosis Date    Actinic keratosis of scalp     Anxiety state     Asthma     Dr. Chau    Cystocele, unspecified     Gastritis     Hemorrhoids     Hiatal hernia     Hypercholesterolemia     Hypertension     Osteoporosis     halo fracture right humerus    Prediabetes     Sciatica      Past Surgical History:   Procedure Laterality Date    BIOPSY  2013    lip, noncancerous    BIOPSY Left 03/2021    left leg - apex derm    BIOPSY  03/2021    forehead - apex derm    BLADDER SUSPENSION  1979    COLONOSCOPY  2002    COLONOSCOPY  11/2015    CYSTOSCOPY  2004    with suburethral sling    ESOPHAGOGASTRODUODENOSCOPY      HIP SURGERY Right 06/2019    right hip/femur    TOTAL ABDOMINAL HYSTERECTOMY  W/ BILATERAL SALPINGOOPHORECTOMY  1977    URETHRAL SLING  2004    suburethral sling and cystoscopy     Family History   Problem Relation Name Age of Onset    Heart disease Mother      Lung cancer Father      Other (mouth cancer) Sister      Lung cancer Sister      Heart attack Sister      No Known Problems Sister      Lung cancer Brother      No Known Problems Daughter      No Known Problems Son         CURRENT MEDICATIONS  Current Outpatient Medications   Medication Sig Dispense Refill    acetaminophen (Tylenol 8 HOUR) 650 mg ER tablet Take 2 tablets (1,300 mg) by mouth every 8 hours if needed for mild pain (1 - 3), moderate pain (4 - 6) or headaches.      ALPRAZolam (Xanax) 0.25 mg tablet Take 1 tablet (0.25 mg) by mouth once daily as needed for anxiety. 30 tablet 5    amLODIPine (Norvasc) 10 mg tablet Take 1 tablet (10 mg) by mouth once daily.      atorvastatin (Lipitor) 10 mg tablet Take 1 tablet (10 mg) by mouth once daily.      calcium carbonate-vitamin D3 (Os-Jory 500 + D3) 500 mg-5 mcg (200 unit) tablet Take 1 tablet by mouth once daily.      folic acid/multivit-min/lutein (CENTRUM SILVER ORAL) Take 1 tablet by mouth once daily.      lidocaine (Lidoderm) 5 % patch Place 1 patch on the skin once daily. Remove after 12 hours      loperamide (Imodium) 2 mg capsule       metoprolol succinate XL (Toprol-XL) 50 mg 24 hr tablet take 1 tablet by mouth once daily 30 tablet 5    mirtazapine (Remeron) 15 mg tablet TAKE ONE TABLET BY MOUTH AT BEDTIME ONCE A DAY 30 tablet 0    omeprazole (PriLOSEC) 20 mg DR capsule Take 1 capsule (20 mg) by mouth once daily in the morning. Take before meals. 30 minutes prior      tiZANidine (Zanaflex) 4 mg capsule Take 1 capsule (4 mg) by mouth 3 times a day as needed for muscle spasms (pain) for up to 20 days. 30 capsule 1     No current facility-administered medications for this visit.       ALLERGIES AND DRUG REACTIONS  No Known Allergies         OBJECTIVE  Visit Vitals  /73  "  Pulse 82   Ht 1.651 m (5' 5\")   Wt 60.3 kg (133 lb)   SpO2 97%   BMI 22.13 kg/m²   OB Status Hysterectomy   Smoking Status Former   BSA 1.66 m²       Last Recorded Pain Score (if available):                Physical Exam  Vitals and nursing note reviewed.       General: Sitting in chair, NAD  Head: NCAT  Eyes: Sclera/conjunctiva clear, EOMI, PERRL  Nose/mouth: MMM  CV: Good distal pulses  Lungs: Good/equal chest excursion  Abdomen: Soft, ND  Ext: No cyanosis/edema  MSK: L-spine alignment: thoracic kyphosis, BL paraspinal m TTP,  ROM: +facet-loading BL    Neuro: AAOx3, CN Grossly nl  Dermatome sensation to light touch  LEFT L1 (lower pelvis/upper thigh): WNL    RIGHT L1: WNL      LEFT L2 (upper thigh): WNL       RIGHT: L2:WNL      LEFT L3 (medial knee): WNL       RIGHT L3: WNL      LEFT L4 (superior medial malleolus): WNL       RIGHT L4: WNL      LEFT L5 (dorsal foot): WNL       RIGHT L5: WNL      LEFT S1 (lateral foot): WNL     RIGHT S1: WNL      LEFT S2 (popliteal fossa): WNL    RIGHT S2: WNL        Motor strength  LEFT L2 (hip flexion): 5/5   RIGHT L2: 5/5  LEFT L3 (knee extension): 5/5     RIGHT L3: 5/5  LEFT L4 (dorsiflexion): 5/5     RIGHT L4: 5/5  LEFT L5 (EHL extension): 5/5     RIGHT L5: 5/5  LEFT S1 (plantarflexion): 5/5     RIGHT S1: 5/5  LEFT S2 (knee flexion): 5/5      RIGHT S2: 5/5      Psych: affect nl  Skin: no rash/lesions      REVIEW OF LABORATORY DATA  I have reviewed the following lab results:  WBC   Date Value Ref Range Status   07/01/2024 9.2 4.4 - 11.3 x10*3/uL Final     RBC   Date Value Ref Range Status   07/01/2024 4.28 4.00 - 5.20 x10*6/uL Final     Hemoglobin   Date Value Ref Range Status   07/01/2024 13.0 12.0 - 16.0 g/dL Final     Hematocrit   Date Value Ref Range Status   07/01/2024 39.8 36.0 - 46.0 % Final     MCV   Date Value Ref Range Status   07/01/2024 93 80 - 100 fL Final     MCH   Date Value Ref Range Status   07/01/2024 30.4 26.0 - 34.0 pg Final     MCHC   Date Value Ref Range " Status   07/01/2024 32.7 32.0 - 36.0 g/dL Final     RDW   Date Value Ref Range Status   07/01/2024 12.4 11.5 - 14.5 % Final     Platelets   Date Value Ref Range Status   07/01/2024 367 150 - 450 x10*3/uL Final     MPV   Date Value Ref Range Status   08/21/2023 10.4 7.0 - 12.6 CU Final     Sodium   Date Value Ref Range Status   07/01/2024 132 (L) 133 - 145 mmol/L Final     Potassium   Date Value Ref Range Status   07/01/2024 4.5 3.4 - 5.1 mmol/L Final     Bicarbonate   Date Value Ref Range Status   07/01/2024 25 24 - 31 mmol/L Final     Urea Nitrogen   Date Value Ref Range Status   07/01/2024 11 8 - 25 mg/dL Final     Calcium   Date Value Ref Range Status   07/01/2024 9.2 8.5 - 10.4 mg/dL Final     Protime   Date Value Ref Range Status   07/25/2022 11.9 9.8 - 13.4 sec Final     INR   Date Value Ref Range Status   07/25/2022 1.0 0.9 - 1.1 Final         REVIEW OF RADIOLOGY   I have reviewed the following:  Radiology Studies           XR L-spine 6/26/24:  Multiple vertebral compression fractures are present and have  worsened.      The previous T12 vertebral compression fracture has shown greater  degree of worsening now with a near vertebral plana appearance.      There is a new mild-to-moderate vertebral compression fracture at T11  with an approximate 40% anterior height loss.      New mild vertebral compression fractures at L1 and progression of the  L2 vertebral compression fracture from prior study.      Increased kyphosis across the lumbar spine      IMPRESSION:  Interval development of and progression of multiple vertebral  compression fractures with increasing kyphosis. If deemed clinically  appropriate, MRI can be considered for further assessment.       ASSESSMENT & PLAN  Stephanie Jimenez is a 88 y.o. female with PMH HTN, GERD, PAF, dementia, SCC s/p Mohs who presents for F/U LB pain    1) Vertebral compression fx, improved  -T11 kypho 7/16/24: 100% ongoing    2) Myofascial pain  -TPI/L VANITA CSI 8/29/24: 75%  ongoing relief  -Repeat PRN    3) Lumbar spondylosis  -Refractive to >4 mo tx including Tylenol, NSAIDs, tramadol, tizanidine, heating pad, >2 w PT, lido patch, [place]  -MRI T/L-spine 7/11/24: multilevel spondylosis  -BL LMBNB #1 10/17/24: 80% relief  -BL LMBNB #2 11/7/24: 80% relief  -Schedule LMBN RFA w/ IV sed of BL L4-5, L5-S1 facet joints to target pain generator as seen on imaging and minimize risk/likelihood of chronic opioid use and/or surgery            Discussed procedure risks/benefits in detail with patient. Pt meets medical necessity for procedure due to failure of conservative measures. Reviewed procedural risks including bleeding, infection, nerve damage, paralysis. Also reviewed mitigating factors such as screening for infection/blood thinner use, sterile precautions, and image-guidance when applicable. All questions answered. Pt/guardian expressed understanding and choose to proceed                Ezequiel Hernandez MD  Anesthesiologist & Interventional Pain Physician   [facility] [place]  O: [phone]  F: [phone]  11:17 AM  11/18/24

## 2024-11-19 ENCOUNTER — APPOINTMENT (OUTPATIENT)
Dept: PAIN MEDICINE | Facility: CLINIC | Age: 89
End: 2024-11-19
Payer: MEDICARE

## 2024-11-21 ENCOUNTER — HOSPITAL ENCOUNTER (OUTPATIENT)
Dept: GASTROENTEROLOGY | Facility: HOSPITAL | Age: 89
Discharge: HOME | End: 2024-11-21
Payer: MEDICARE

## 2024-11-21 VITALS
HEART RATE: 86 BPM | HEIGHT: 65 IN | BODY MASS INDEX: 22.16 KG/M2 | SYSTOLIC BLOOD PRESSURE: 147 MMHG | OXYGEN SATURATION: 98 % | RESPIRATION RATE: 16 BRPM | TEMPERATURE: 98.6 F | WEIGHT: 133 LBS | DIASTOLIC BLOOD PRESSURE: 66 MMHG

## 2024-11-21 DIAGNOSIS — M47.816 LUMBAR SPONDYLOSIS: ICD-10-CM

## 2024-11-21 PROCEDURE — 99152 MOD SED SAME PHYS/QHP 5/>YRS: CPT | Performed by: ANESTHESIOLOGY

## 2024-11-21 PROCEDURE — 64635 DESTROY LUMB/SAC FACET JNT: CPT | Performed by: ANESTHESIOLOGY

## 2024-11-21 PROCEDURE — 3700000012 HC SEDATION LEVEL 5+ TIME - INITIAL 15 MINUTES 5/> YEARS

## 2024-11-21 PROCEDURE — 64636 DESTROY L/S FACET JNT ADDL: CPT | Mod: 50 | Performed by: ANESTHESIOLOGY

## 2024-11-21 PROCEDURE — 2500000004 HC RX 250 GENERAL PHARMACY W/ HCPCS (ALT 636 FOR OP/ED): Performed by: ANESTHESIOLOGY

## 2024-11-21 RX ORDER — FENTANYL CITRATE 50 UG/ML
INJECTION, SOLUTION INTRAMUSCULAR; INTRAVENOUS AS NEEDED
Status: COMPLETED | OUTPATIENT
Start: 2024-11-21 | End: 2024-11-21

## 2024-11-21 RX ORDER — LIDOCAINE HYDROCHLORIDE 20 MG/ML
INJECTION, SOLUTION EPIDURAL; INFILTRATION; INTRACAUDAL; PERINEURAL AS NEEDED
Status: COMPLETED | OUTPATIENT
Start: 2024-11-21 | End: 2024-11-21

## 2024-11-21 RX ORDER — ONDANSETRON HYDROCHLORIDE 4 MG/2ML
INJECTION, SOLUTION INTRAMUSCULAR; INTRAVENOUS AS NEEDED
Status: COMPLETED | OUTPATIENT
Start: 2024-11-21 | End: 2024-11-21

## 2024-11-21 RX ORDER — MIDAZOLAM HYDROCHLORIDE 1 MG/ML
INJECTION, SOLUTION INTRAMUSCULAR; INTRAVENOUS AS NEEDED
Status: COMPLETED | OUTPATIENT
Start: 2024-11-21 | End: 2024-11-21

## 2024-11-21 RX ORDER — ONDANSETRON HYDROCHLORIDE 2 MG/ML
4 INJECTION, SOLUTION INTRAVENOUS ONCE
Status: DISCONTINUED | OUTPATIENT
Start: 2024-11-21 | End: 2024-11-22 | Stop reason: HOSPADM

## 2024-11-21 RX ORDER — LIDOCAINE HYDROCHLORIDE 10 MG/ML
INJECTION, SOLUTION EPIDURAL; INFILTRATION; INTRACAUDAL; PERINEURAL AS NEEDED
Status: COMPLETED | OUTPATIENT
Start: 2024-11-21 | End: 2024-11-21

## 2024-11-21 ASSESSMENT — PAIN SCALES - GENERAL
PAINLEVEL_OUTOF10: 4
PAINLEVEL_OUTOF10: 0 - NO PAIN
PAINLEVEL_OUTOF10: 0 - NO PAIN

## 2024-11-21 ASSESSMENT — PAIN - FUNCTIONAL ASSESSMENT
PAIN_FUNCTIONAL_ASSESSMENT: 0-10

## 2024-11-21 ASSESSMENT — COLUMBIA-SUICIDE SEVERITY RATING SCALE - C-SSRS
1. IN THE PAST MONTH, HAVE YOU WISHED YOU WERE DEAD OR WISHED YOU COULD GO TO SLEEP AND NOT WAKE UP?: NO
2. HAVE YOU ACTUALLY HAD ANY THOUGHTS OF KILLING YOURSELF?: NO
6. HAVE YOU EVER DONE ANYTHING, STARTED TO DO ANYTHING, OR PREPARED TO DO ANYTHING TO END YOUR LIFE?: NO

## 2024-11-21 ASSESSMENT — ENCOUNTER SYMPTOMS
OCCASIONAL FEELINGS OF UNSTEADINESS: 1
DEPRESSION: 1
LOSS OF SENSATION IN FEET: 0

## 2024-11-21 ASSESSMENT — PAIN DESCRIPTION - DESCRIPTORS: DESCRIPTORS: ACHING;DULL

## 2024-11-21 NOTE — INTERVAL H&P NOTE
H&P reviewed. The patient was examined and there are no changes to the H&P. Stephanie Jimenez is a 88 y.o. female with PMH HTN, GERD, PAF, dementia, SCC s/p Mohs who presents for  LMBN RFA w/ IV sed of BL L4-5, L5-S1 facet joints to target pain generator as seen on imaging and minimize risk/likelihood of chronic opioid use and/or surgery. Patient's pain stable and persistent from last visit.  Appropriately NPO. ASA 3. No personal/family hx issues with anesthesia. Denies allergies to Latex, steroids, local anesthetics, or iodine/contrast. Denies being on blood thinners. Not diabetic.  Denies fever, chills, NS, CP, SOB, cough, N/V.    Discussed procedure risks/benefits in detail with patient. Pt meets medical necessity for procedure due to failure of conservative measures. Reviewed procedural risks including bleeding, infection, nerve damage, paralysis. Also reviewed mitigating factors such as screening for infection/blood thinner use, sterile precautions, and image-guidance when applicable. All questions answered. Pt/guardian expressed understanding and choose to proceed      Ezequiel Hernandez MD  Anesthesiologist & Interventional Pain Physician   [facility] [place]  O: [phone]  F: [phone]  11:15 AM  11/21/24

## 2024-11-21 NOTE — Clinical Note
Dressing applied to insertion sites. Patient tolerated procedure well. Post grounding pad site clear.

## 2024-11-21 NOTE — Clinical Note
Ablation initiated on right lumbar, patient uncomfortable, ablation paused, more local given by Dr. Hernandez.

## 2024-11-21 NOTE — DISCHARGE INSTRUCTIONS
DISCHARGE INSTRUCTIONS FOR INJECTIONS     You underwent a lumbar medial branch nerve radiofrequency ablation today    Aftermost injections, it is recommended that you relax and limit your activity for the remainder of the day unless you have been told otherwise by your pain physician.  You should not drive a car, operate machinery, or make important legal decisions unless otherwise directed by your pain physician.  You may resume your normal activity, including exercise, tomorrow.      Keep a written pain diary of how much pain relief you experienced following the injection procedure and the length of time of pain relief you experienced pain relief. Following diagnostic injections like medial branch nerve blocks, sacroiliac joint blocks, stellate ganglion injections and other blocks, it is very important you record the specific amount of pain relief you experienced immediately after the injectionand how long it lasted. Your doctor will ask you for this information at your follow up visit.     For all injections, please keep the injection site dry and inspect the site for a couple of days. You may remove the Band-Aid the day of the injection at any time.     Some discomfort, bruising or slight swelling may occur at the injection site. This is not abnormal if it occurs.  If needed you may:    -Take over the counter medication such as Tylenol or Motrin.   -Apply an ice pack for 30 minutes, 2 to 3 times a day for the first 24 hours.     You may shower today; no soaking baths, hot tubs, whirlpools or swimming pools for two days.      If you are given steroids in your injection, it may take 3-5 days for the steroid medication to take effect. You may notice a worsening of your symptoms for 1-2 days after the injection. This is not abnormal.  You may use acetaminophen, ibuprofen, or prescription medication that your doctor may have prescribed for you if you need to do so.     A few common side effects of steroids include  facial flushing, sweating, restlessness, irritability,difficulty sleeping, increase in blood sugar, and increased blood pressure. If you have diabetes, please monitor your blood sugar at least once a day for at least 5 days. If you have poorly controlled high blood pressure, monitoryour blood pressure for at least 2 days and contact your primary care physician if these numbers are unusually high for you.      If you take aspirin or non-steroidal anti-inflammatory drugs (examples are Motrin, Advil, ibuprofen, Naprosyn, Voltaren, Relafen, etc.) you may restart these this evening, but stop taking it 3 days before your next appointment, unless instructed otherwiseby your physician.      You do not need to discontinue non-aspirin-containing pain medications prior to an injection (examples: Celebrex, tramadol, hydrocodone and acetaminophen).      If you take a blood thinning medication (Coumadin, Lovenox, Fragmin,Ticlid, Plavix, Pradaxa, etc.), please discuss this with your primary care physician/cardiologist and your pain physician. These medications MUST be discontinued before you can have an injection safely, without the risk of uncontrolled bleeding. If these medications are not discontinued for an appropriate period of time, you will not be able to receivean injection.      If you are taking Coumadin, please have your INR checked the morning of your procedure and bringthe result to your appointment unless otherwise instructed. If your INR is over 1.2, your injection may need to be rescheduled to avoid uncontrolled bleeding from the needle placement.     Call [facility] at [phone] between 8am-4pm Monday - Friday if you are experiencing the following:    If you received an epidural or spinal injection:    -Headache that doesnot go away with medicine, is worse when sitting or standing up, and is greatly relieved upon lying down.   -Severe pain worse than or different than your baseline pain.    -Chills or fever (101º F or greater).   -Drainage or signs of infection at the injection site     Go directly to the Emergency Department if you are experiencing the following and received an epidural or spinal injection:   -Abrupt weakness or progressive weakness in your legs that starts after you leave the clinic.   -Abrupt severe or worsening numbness in your legs.   -Inability to urinate after the injection or loss of bowel or bladder control without the urge to defecate or urinate.     If you have a clinical question that cannot wait until your next appointment, please call [phone] between 8am-4pm Monday - Friday or send a [employer] message. We do our best to return all non-emergency messages within 24 hours, Monday - Friday. A nurse or physician will return your message.      If you need to cancel an appointment, please call the scheduling staff at [phone] during normal business hours or leave a message at least 24 hours in advance.     If you are going to be sedated for your next procedure, you MUST have responsible adult who can legally drive accompany you home. You cannot eat or drink for eight hours prior to the planned procedure if you are going to receive sedation. You may take your non-blood thinning medications with a small sip of water.

## 2024-12-18 ENCOUNTER — OFFICE VISIT (OUTPATIENT)
Dept: PAIN MEDICINE | Facility: CLINIC | Age: 89
End: 2024-12-18
Payer: MEDICARE

## 2024-12-18 VITALS — OXYGEN SATURATION: 98 % | SYSTOLIC BLOOD PRESSURE: 120 MMHG | DIASTOLIC BLOOD PRESSURE: 60 MMHG | HEART RATE: 95 BPM

## 2024-12-18 DIAGNOSIS — Z98.890 S/P KYPHOPLASTY: Primary | ICD-10-CM

## 2024-12-18 DIAGNOSIS — M47.816 LUMBAR SPONDYLOSIS: ICD-10-CM

## 2024-12-18 DIAGNOSIS — M79.18 DIFFUSE MYOFASCIAL PAIN SYNDROME: ICD-10-CM

## 2024-12-18 PROCEDURE — 3078F DIAST BP <80 MM HG: CPT | Performed by: ANESTHESIOLOGY

## 2024-12-18 PROCEDURE — 99213 OFFICE O/P EST LOW 20 MIN: CPT | Performed by: ANESTHESIOLOGY

## 2024-12-18 PROCEDURE — 1160F RVW MEDS BY RX/DR IN RCRD: CPT | Performed by: ANESTHESIOLOGY

## 2024-12-18 PROCEDURE — 1125F AMNT PAIN NOTED PAIN PRSNT: CPT | Performed by: ANESTHESIOLOGY

## 2024-12-18 PROCEDURE — 1123F ACP DISCUSS/DSCN MKR DOCD: CPT | Performed by: ANESTHESIOLOGY

## 2024-12-18 PROCEDURE — G2211 COMPLEX E/M VISIT ADD ON: HCPCS | Performed by: ANESTHESIOLOGY

## 2024-12-18 PROCEDURE — 99214 OFFICE O/P EST MOD 30 MIN: CPT | Performed by: ANESTHESIOLOGY

## 2024-12-18 PROCEDURE — 3074F SYST BP LT 130 MM HG: CPT | Performed by: ANESTHESIOLOGY

## 2024-12-18 PROCEDURE — 1036F TOBACCO NON-USER: CPT | Performed by: ANESTHESIOLOGY

## 2024-12-18 PROCEDURE — 1159F MED LIST DOCD IN RCRD: CPT | Performed by: ANESTHESIOLOGY

## 2024-12-18 ASSESSMENT — ENCOUNTER SYMPTOMS
NEUROLOGICAL NEGATIVE: 1
DEPRESSION: 0
GASTROINTESTINAL NEGATIVE: 1
BACK PAIN: 1
OCCASIONAL FEELINGS OF UNSTEADINESS: 0
PSYCHIATRIC NEGATIVE: 1
RESPIRATORY NEGATIVE: 1
EYES NEGATIVE: 1
CARDIOVASCULAR NEGATIVE: 1
HEMATOLOGIC/LYMPHATIC NEGATIVE: 1
LOSS OF SENSATION IN FEET: 0
ENDOCRINE NEGATIVE: 1
CONSTITUTIONAL NEGATIVE: 1

## 2024-12-18 ASSESSMENT — PAIN - FUNCTIONAL ASSESSMENT: PAIN_FUNCTIONAL_ASSESSMENT: 0-10

## 2024-12-18 ASSESSMENT — PAIN SCALES - GENERAL
PAINLEVEL_OUTOF10: 2
PAINLEVEL_OUTOF10: 2

## 2024-12-18 ASSESSMENT — PAIN DESCRIPTION - DESCRIPTORS: DESCRIPTORS: SORE

## 2024-12-18 NOTE — PROGRESS NOTES
PAIN MANAGEMENT FOLLOW-UP OFFICE NOTE    Date of Service: 12/18/2024    SUBJECTIVE    CHIEF COMPLAINT: LBP    HISTORY OF PRESENT ILLNESS    Stephanie Jimenez is a 89 y.o. female with PMH HTN, GERD, PAF, dementia, SCC s/p Mohs who presents for F/U LBP. Pt is a poor historian and her daughter is present to assist with hx.     On 11/21, pt underwent BL LMBN RFA with 90% ongoing relief. Since that time, pt feels LBP well-controlled. She is pleased with her outcome.    Pt denies new-onset numbness, weakness, bowel/bladder incontinence.  Pt denies recent infection, allergy to Latex/iodine/contrast. Patient is currently taking the following blood thinner(s): N/A    Procedure log:  -BL LMBN RFA 11/21/24: 90% ongoing relief  -BL LMBNB #2 11/7/24: 80% relief  -BL LMBNB #1 10/17/24: 80% relief  -TPI/L VANITA CSI 8/29/24: 75% ongoing relief  -T11 kypho 7/16/24: 100%    REVIEW OF SYSTEMS  Review of Systems   Constitutional: Negative.    HENT: Negative.     Eyes: Negative.    Respiratory: Negative.     Cardiovascular: Negative.    Gastrointestinal: Negative.    Endocrine: Negative.    Musculoskeletal:  Positive for back pain.   Skin: Negative.    Neurological: Negative.    Hematological: Negative.    Psychiatric/Behavioral: Negative.         PAST MEDICAL HISTORY  Past Medical History:   Diagnosis Date    Actinic keratosis of scalp     Anxiety state     Asthma     Dr. Chau    Cystocele, unspecified     Gastritis     Hemorrhoids     Hiatal hernia     Hypercholesterolemia     Hypertension     Osteoporosis     halo fracture right humerus    Prediabetes     Sciatica      Past Surgical History:   Procedure Laterality Date    BIOPSY  2013    lip, noncancerous    BIOPSY Left 03/2021    left leg - apex derm    BIOPSY  03/2021    forehead - apex derm    BLADDER SUSPENSION  1979    COLONOSCOPY  2002    COLONOSCOPY  11/2015    CYSTOSCOPY  2004    with suburethral sling    ESOPHAGOGASTRODUODENOSCOPY      HIP SURGERY Right 06/2019    right  hip/femur    TOTAL ABDOMINAL HYSTERECTOMY W/ BILATERAL SALPINGOOPHORECTOMY  1977    URETHRAL SLING  2004    suburethral sling and cystoscopy     Family History   Problem Relation Name Age of Onset    Heart disease Mother      Lung cancer Father      Other (mouth cancer) Sister      Lung cancer Sister      Heart attack Sister      No Known Problems Sister      Lung cancer Brother      No Known Problems Daughter      No Known Problems Son         CURRENT MEDICATIONS  Current Outpatient Medications   Medication Sig Dispense Refill    acetaminophen (Tylenol 8 HOUR) 650 mg ER tablet Take 2 tablets (1,300 mg) by mouth every 8 hours if needed for mild pain (1 - 3), moderate pain (4 - 6) or headaches.      ALPRAZolam (Xanax) 0.25 mg tablet Take 1 tablet (0.25 mg) by mouth once daily as needed for anxiety. 30 tablet 5    amLODIPine (Norvasc) 10 mg tablet Take 1 tablet (10 mg) by mouth once daily.      atorvastatin (Lipitor) 10 mg tablet Take 1 tablet (10 mg) by mouth once daily.      calcium carbonate-vitamin D3 (Os-Jory 500 + D3) 500 mg-5 mcg (200 unit) tablet Take 1 tablet by mouth once daily.      folic acid/multivit-min/lutein (CENTRUM SILVER ORAL) Take 1 tablet by mouth once daily.      lidocaine (Lidoderm) 5 % patch Place 1 patch on the skin once daily. Remove after 12 hours      loperamide (Imodium) 2 mg capsule       metoprolol succinate XL (Toprol-XL) 50 mg 24 hr tablet take 1 tablet by mouth once daily 30 tablet 5    mirtazapine (Remeron) 15 mg tablet TAKE ONE TABLET BY MOUTH AT BEDTIME ONCE A DAY 30 tablet 0    omeprazole (PriLOSEC) 20 mg DR capsule Take 1 capsule (20 mg) by mouth once daily in the morning. Take before meals. 30 minutes prior      tiZANidine (Zanaflex) 4 mg capsule Take 1 capsule (4 mg) by mouth 3 times a day as needed for muscle spasms (pain) for up to 20 days. 30 capsule 1     No current facility-administered medications for this visit.       ALLERGIES AND DRUG REACTIONS  No Known Allergies          OBJECTIVE  Visit Vitals  /60   Pulse 95   SpO2 98%   OB Status Hysterectomy   Smoking Status Former       Last Recorded Pain Score (if available):       Pain Score:   2       Physical Exam  Vitals and nursing note reviewed.     General: Sitting in chair, NAD  Head: NCAT  Eyes: Sclera/conjunctiva clear, EOMI, PERRL  Nose/mouth: MMM  CV: Good distal pulses  Lungs: Good/equal chest excursion  Abdomen: Soft, ND  Ext: No cyanosis/edema  MSK: L-spine alignment: thoracic kyphosis, BL paraspinal m TTP,  ROM: +facet-loading BL    Neuro: AAOx3, CN Grossly nl  Dermatome sensation to light touch  LEFT L1 (lower pelvis/upper thigh): WNL    RIGHT L1: WNL      LEFT L2 (upper thigh): WNL       RIGHT: L2:WNL      LEFT L3 (medial knee): WNL       RIGHT L3: WNL      LEFT L4 (superior medial malleolus): WNL       RIGHT L4: WNL      LEFT L5 (dorsal foot): WNL       RIGHT L5: WNL      LEFT S1 (lateral foot): WNL     RIGHT S1: WNL      LEFT S2 (popliteal fossa): WNL    RIGHT S2: WNL        Motor strength  LEFT L2 (hip flexion): 5/5   RIGHT L2: 5/5  LEFT L3 (knee extension): 5/5     RIGHT L3: 5/5  LEFT L4 (dorsiflexion): 5/5     RIGHT L4: 5/5  LEFT L5 (EHL extension): 5/5     RIGHT L5: 5/5  LEFT S1 (plantarflexion): 5/5     RIGHT S1: 5/5  LEFT S2 (knee flexion): 5/5      RIGHT S2: 5/5      Psych: affect nl  Skin: no rash/lesions      REVIEW OF LABORATORY DATA  I have reviewed the following lab results:  WBC   Date Value Ref Range Status   07/01/2024 9.2 4.4 - 11.3 x10*3/uL Final     RBC   Date Value Ref Range Status   07/01/2024 4.28 4.00 - 5.20 x10*6/uL Final     Hemoglobin   Date Value Ref Range Status   07/01/2024 13.0 12.0 - 16.0 g/dL Final     Hematocrit   Date Value Ref Range Status   07/01/2024 39.8 36.0 - 46.0 % Final     MCV   Date Value Ref Range Status   07/01/2024 93 80 - 100 fL Final     MCH   Date Value Ref Range Status   07/01/2024 30.4 26.0 - 34.0 pg Final     [facility]   Date Value Ref Range Status   07/01/2024 32.7  32.0 - 36.0 g/dL Final     RDW   Date Value Ref Range Status   07/01/2024 12.4 11.5 - 14.5 % Final     Platelets   Date Value Ref Range Status   07/01/2024 367 150 - 450 x10*3/uL Final     MPV   Date Value Ref Range Status   08/21/2023 10.4 7.0 - 12.6 CU Final     Sodium   Date Value Ref Range Status   07/01/2024 132 (L) 133 - 145 mmol/L Final     Potassium   Date Value Ref Range Status   07/01/2024 4.5 3.4 - 5.1 mmol/L Final     Bicarbonate   Date Value Ref Range Status   07/01/2024 25 24 - 31 mmol/L Final     Urea Nitrogen   Date Value Ref Range Status   07/01/2024 11 8 - 25 mg/dL Final     Calcium   Date Value Ref Range Status   07/01/2024 9.2 8.5 - 10.4 mg/dL Final     Protime   Date Value Ref Range Status   07/25/2022 11.9 9.8 - 13.4 sec Final     INR   Date Value Ref Range Status   07/25/2022 1.0 0.9 - 1.1 Final         REVIEW OF RADIOLOGY   I have reviewed the following:  Radiology Studies           XR L-spine 6/26/24:  Multiple vertebral compression fractures are present and have  worsened.      The previous T12 vertebral compression fracture has shown greater  degree of worsening now with a near vertebral plana appearance.      There is a new mild-to-moderate vertebral compression fracture at T11  with an approximate 40% anterior height loss.      New mild vertebral compression fractures at L1 and progression of the  L2 vertebral compression fracture from prior study.      Increased kyphosis across the lumbar spine      IMPRESSION:  Interval development of and progression of multiple vertebral  compression fractures with increasing kyphosis. If deemed clinically  appropriate, MRI can be considered for further assessment.       ASSESSMENT & PLAN  Stephanie Jimenez is a 89 y.o. female with PMH HTN, GERD, PAF, dementia, SCC s/p Mohs who presents for F/U LB pain    1) Vertebral compression fx, improved  -T11 kypho 7/16/24: 100% ongoing    2) Myofascial pain, improved  -TPI/L VANITA CSI 8/29/24: 75% ongoing  relief  -Repeat PRN    3) Lumbar spondylosis, improved  -Refractive to >4 mo tx including Tylenol, NSAIDs, tramadol, tizanidine, heating pad, >2 w PT, lido patch, Elbe  -MRI T/L-spine 7/11/24: multilevel spondylosis  -BL LMBN RFA 11/21/24: 90% ongoing relief            Today's visit involved continuation of chronic pain care. In the context of the complexity of this patient's chronic pain diagnosis, long-term expectations and care planning discussed. Adequate time taken to ensure patient understanding and answer questions. Imaging studies ordered are placed do elucidate the patient's diagnosis, but also to evaluate the patient's candidacy for procedural and surgical interventions. The risks and benefits of these potential interventions are detailed as above.                   Ezequiel Hernandez MD  Anesthesiologist & Interventional Pain Physician   [facility] [place]  O: [phone]  F: [phone]  9:23 AM  12/18/24

## 2025-01-20 ENCOUNTER — TELEPHONE (OUTPATIENT)
Dept: PRIMARY CARE | Facility: CLINIC | Age: 89
End: 2025-01-20
Payer: MEDICARE

## 2025-01-20 NOTE — TELEPHONE ENCOUNTER
"WALLACEREESE Esther -   Patient was T&R from [facility] ED 1/7/25 with AMS. Per DC instructions: In summary-evaluation with labs, imaging including urine chest x-ray EKG performed. Overall reassuring evaluation she has improvement of her condition. We discussed monitoring her condition (discussed with primary care other possible causes of transient alteration of her mental status). Follow-up with primary care ideally within a week for further care.   Phoned Leanne Caba/ daughter in follow up, offered to schedule a House Calls visit and she declined at this time. She stated, \"She is fine\". She reported her mother drank wine and she felt this was related to her symptoms. Leanne is aware her mother has a House Calls visit scheduled with Alisha Torrez NP on 2/27/25. Advised to contact the House Calls office with any questions or concerns in the interim and she was in agreement. She expressed appreciation for the call.   "

## 2025-02-14 DIAGNOSIS — F41.1 ANXIETY STATE: ICD-10-CM

## 2025-02-17 RX ORDER — ALPRAZOLAM 0.25 MG/1
0.25 TABLET ORAL DAILY PRN
Qty: 30 TABLET | Refills: 2 | Status: SHIPPED | OUTPATIENT
Start: 2025-02-17

## 2025-02-26 ENCOUNTER — TELEPHONE (OUTPATIENT)
Dept: PRIMARY CARE | Facility: CLINIC | Age: 89
End: 2025-02-26
Payer: MEDICARE

## 2025-02-26 NOTE — TELEPHONE ENCOUNTER
2/27/25 House Calls visit with Alisha Torrez NP confirmed via phone with Leanne Caba/ patient's daughter.

## 2025-02-27 ENCOUNTER — OFFICE VISIT (OUTPATIENT)
Dept: PRIMARY CARE | Facility: CLINIC | Age: 89
End: 2025-02-27
Payer: MEDICARE

## 2025-02-27 VITALS
TEMPERATURE: 97.3 F | BODY MASS INDEX: 22.16 KG/M2 | HEIGHT: 65 IN | WEIGHT: 133 LBS | SYSTOLIC BLOOD PRESSURE: 114 MMHG | RESPIRATION RATE: 18 BRPM | HEART RATE: 75 BPM | OXYGEN SATURATION: 99 % | DIASTOLIC BLOOD PRESSURE: 60 MMHG

## 2025-02-27 DIAGNOSIS — I10 HTN (HYPERTENSION), BENIGN: Primary | ICD-10-CM

## 2025-02-27 DIAGNOSIS — I48.0 PAROXYSMAL ATRIAL FIBRILLATION (MULTI): ICD-10-CM

## 2025-02-27 DIAGNOSIS — K21.9 CHRONIC GERD: ICD-10-CM

## 2025-02-27 DIAGNOSIS — E78.2 MIXED HYPERLIPIDEMIA: ICD-10-CM

## 2025-02-27 DIAGNOSIS — F41.1 GENERALIZED ANXIETY DISORDER: ICD-10-CM

## 2025-02-27 DIAGNOSIS — M47.816 LUMBAR SPONDYLOSIS: ICD-10-CM

## 2025-02-27 PROCEDURE — 1036F TOBACCO NON-USER: CPT | Performed by: NURSE PRACTITIONER

## 2025-02-27 PROCEDURE — 3074F SYST BP LT 130 MM HG: CPT | Performed by: NURSE PRACTITIONER

## 2025-02-27 PROCEDURE — 3078F DIAST BP <80 MM HG: CPT | Performed by: NURSE PRACTITIONER

## 2025-02-27 PROCEDURE — 1160F RVW MEDS BY RX/DR IN RCRD: CPT | Performed by: NURSE PRACTITIONER

## 2025-02-27 PROCEDURE — 1123F ACP DISCUSS/DSCN MKR DOCD: CPT | Performed by: NURSE PRACTITIONER

## 2025-02-27 PROCEDURE — 1159F MED LIST DOCD IN RCRD: CPT | Performed by: NURSE PRACTITIONER

## 2025-02-27 PROCEDURE — 99349 HOME/RES VST EST MOD MDM 40: CPT | Performed by: NURSE PRACTITIONER

## 2025-02-27 PROCEDURE — 1126F AMNT PAIN NOTED NONE PRSNT: CPT | Performed by: NURSE PRACTITIONER

## 2025-02-27 RX ORDER — MULTIVIT,IRON,MINERALS/LUTEIN
TABLET ORAL
COMMUNITY
Start: 2025-02-07

## 2025-02-27 ASSESSMENT — PAIN SCALES - GENERAL: PAINLEVEL_OUTOF10: 0-NO PAIN

## 2025-02-27 NOTE — PROGRESS NOTES
"Subjective   Patient ID: Stephanie Jimenez is a 89 y.o. female who presents for Follow-up (Routine follow up recent ED visit for confusion).    Visit for 89 y/o female seen today at [facility] in [place] for routine follow up of chronic medical conditions. PMHx HTN, HLD, Paroxysmal Atrial Fibrillation, GERD, Osteoporosis, chronic pain, lumbar spondylosis, anxiety. Pt is lying in bed this morning. She is alert, able to answer simple questions regarding her health. She does have some noted cognitive impairment and is not always able to answer questions regarding her health history. Pt lives in an AL. She is able to perform her ADLs including dressing, toileting and showering but does require assistance with her medications and meal preparation. Pt reports that she is doing very well. She is considering moving to the independent living side of the building. Pt is ambulatory. She uses a walker when going down to the dining room for meals. She does not use the walker inside of her apartment. Pt denies recent fall or injury. She endorses chronic back pain but reports that it is much improved, \"comes and goes\" and is well controlled with Tylenol. Pt is active with pain management. She had a nerve block last fall and reports it to be helpful. Pt denies appetite changes or signs of weight loss. She denies abdominal pain, nausea, vomiting. Denies bowel or bladder concerns. Pt denies difficulty sleeping. Pt recently went to [facility] ED on 1/18/25 for evaluation of confusion.     Per ED notes: This was reported this evening prior to arrival she had gotten up and was getting dressed telling individuals at [facility] that she was supposed to be going to Religious. She had no other complaint, they sent her in for this episode of confusion. On arrival here she understands that she was sent here for an episode of confusion, she tells me that she does not know why she said that she understands that it is not our of days she " would typically go to [facility]. Occasional cough is noted however the patient tells me that she had a cold and has had this now for the last week and that this is not new or of concern to the patient. She has no current complaint including head neck chest or abdominal pain. She notes that she had a history of an arm fracture but is not having pain there currently no urinary burning or discomfort. No new leg pain or swelling she tells me she is hungry she is requesting food and drink. No new medications reported. No new fall or injury otherwise. Pt had lab work, urinalysis with no acute findings. She was discharged back to AL without any new orders.     Pt has HTN. She does not follow with cardiology. Pt reports that facility staff monitors her BP. There have been no issues with elevated readings. Pt denies headaches, dizziness, chest pain, palpitations, shortness of breath. Pt has generalized anxiety disorder. She reports feeling anxious several times per week. Pt is unable to pinpoint what causes the anxiety. She takes routine Mirtazapine and will take Xanax as needed with improvement. Pt denies feelings of depression. She denies SI/HI.         Current Outpatient Medications:     Centrum Silver Women 8 mg iron-400 mcg-50 mcg tablet, , Disp: , Rfl:     acetaminophen (Tylenol 8 HOUR) 650 mg ER tablet, Take 2 tablets (1,300 mg) by mouth every 8 hours if needed for mild pain (1 - 3), moderate pain (4 - 6) or headaches., Disp: , Rfl:     ALPRAZolam (Xanax) 0.25 mg tablet, TAKE 1 TABLET BY MOUTH ONCE DAILY AS NEEDED FOR ANXIETY, Disp: 30 tablet, Rfl: 2    amLODIPine (Norvasc) 10 mg tablet, Take 1 tablet (10 mg) by mouth once daily., Disp: , Rfl:     atorvastatin (Lipitor) 10 mg tablet, Take 1 tablet (10 mg) by mouth once daily., Disp: , Rfl:     calcium carbonate-vitamin D3 (Os-Jory 500 + D3) 500 mg-5 mcg (200 unit) tablet, Take 1 tablet by mouth once daily., Disp: , Rfl:     folic acid/multivit-min/lutein (CENTRUM SILVER  ORAL), Take 1 tablet by mouth once daily., Disp: , Rfl:     lidocaine (Lidoderm) 5 % patch, Place 1 patch on the skin once daily. Remove after 12 hours, Disp: , Rfl:     loperamide (Imodium) 2 mg capsule, , Disp: , Rfl:     metoprolol succinate XL (Toprol-XL) 50 mg 24 hr tablet, take 1 tablet by mouth once daily, Disp: 30 tablet, Rfl: 5    mirtazapine (Remeron) 15 mg tablet, TAKE ONE TABLET BY MOUTH AT BEDTIME ONCE A DAY, Disp: 30 tablet, Rfl: 0    omeprazole (PriLOSEC) 20 mg DR capsule, Take 1 capsule (20 mg) by mouth once daily in the morning. Take before meals. 30 minutes prior, Disp: , Rfl:     tiZANidine (Zanaflex) 4 mg capsule, Take 1 capsule (4 mg) by mouth 3 times a day as needed for muscle spasms (pain) for up to 20 days., Disp: 30 capsule, Rfl: 1     Review of Systems  Constitutional: Negative for appetite change, chills, fatigue and fever.   HENT: Negative for dental problem and trouble swallowing.    Eyes:  Negative for pain and visual disturbance.   Respiratory:  Negative for cough, shortness of breath and wheezing.    Cardiovascular:  Negative for chest pain, palpitations, edema.   Gastrointestinal: Negative for abdominal pain, constipation. diarrhea, nausea and vomiting.   Endocrine: Negative for cold intolerance and heat intolerance.   Genitourinary:  Negative for difficulty urinating, burning, frequency.   Musculoskeletal: Positive for chronic back pain, intermittent, unsteady gait.  Skin:  Negative for color change, pallor, rash and wound.   Allergic/Immunologic: Negative for environmental allergies and food allergies.   Neurological:  Negative for dizziness, seizures, weakness, numbness and headaches.   Hematological:  Negative for adenopathy. Does not bruise/bleed easily.   Psychiatric/Behavioral: Positive for anxiety. Negative for behavioral problems    Objective   /60 (BP Location: Left arm, Patient Position: Sitting, BP Cuff Size: Adult)   Pulse 75   Temp 36.3 °C (97.3 °F) (Temporal)   " Resp 18   Ht 1.651 m (5' 5\")   Wt 60.3 kg (133 lb)   SpO2 99%   BMI 22.13 kg/m²     Physical Exam  Constitutional:       General: She is awake.      Appearance: Normal appearance. She is well-developed.      Comments: Alert, no acute distress  HENT:      Nose: Nose normal.      Mouth/Throat:      Mouth: Mucous membranes are moist.   Eyes:      Extraocular Movements: Extraocular movements intact.      Pupils: Pupils are equal, round, and reactive to light.   Cardiovascular:      Rate and Rhythm: Normal rate and regular rhythm.      No edema.   Pulmonary:      Effort: Pulmonary effort is normal.      Breath sounds: Normal breath sounds.  Abdominal:      General: Abdomen is flat.      Palpations: Abdomen is soft. There is no mass.      Tenderness: There is no abdominal tenderness. There is no guarding. No CVA tenderness.   Musculoskeletal:      Cervical back: Neck supple.   Neurological:      Mental Status: She is alert and oriented to person, place, and time.      Cranial Nerves: Cranial nerves 2-12 are intact.   Psychiatric:         Mood and Affect: Mood is anxious        Behavior: Behavior is cooperative.     Assessment/Plan   Diagnoses and all orders for this visit:  HTN (hypertension), benign  -chronic, vitals stable  -does not follow with cardiology  -continue amlodipine     Mixed hyperlipidemia  -chronic, managed with atorvastatin     Paroxysmal atrial fibrillation (Multi)  -history of, NSR at this time  -continue metoprolol     Chronic GERD  -chronic, stable, no current GI concerns   -continue omeprazole     Lumbar spondylosis  -chronic, follows with pain management   -continue tylenol as needed for pain/discomfort     Generalized anxiety disorder  -chronic, mood stable  -continue Mirtazapine, Xanax as needed     Patient stable. No acute concerns noted on exam today. Will continue house calls NP program and follow with pt in 3 months. Advised pt to contact house calls office with any acute concerns or " medication needs.        Alisha Torrez, APRN-CNP

## 2025-05-13 ENCOUNTER — DOCUMENTATION (OUTPATIENT)
Dept: INTERNAL MEDICINE | Facility: HOSPITAL | Age: 89
End: 2025-05-13
Payer: MEDICARE

## 2025-05-14 ENCOUNTER — APPOINTMENT (OUTPATIENT)
Dept: CARDIOLOGY | Facility: HOSPITAL | Age: 89
End: 2025-05-14
Payer: MEDICARE

## 2025-05-14 ENCOUNTER — TELEPHONE (OUTPATIENT)
Dept: PRIMARY CARE | Facility: CLINIC | Age: 89
End: 2025-05-14

## 2025-05-14 ENCOUNTER — HOSPITAL ENCOUNTER (INPATIENT)
Facility: HOSPITAL | Age: 89
LOS: 2 days | Discharge: HOME | End: 2025-05-16
Attending: STUDENT IN AN ORGANIZED HEALTH CARE EDUCATION/TRAINING PROGRAM | Admitting: STUDENT IN AN ORGANIZED HEALTH CARE EDUCATION/TRAINING PROGRAM
Payer: MEDICARE

## 2025-05-14 DIAGNOSIS — E87.1 HYPONATREMIA: ICD-10-CM

## 2025-05-14 DIAGNOSIS — F10.10 ETOH ABUSE: ICD-10-CM

## 2025-05-14 DIAGNOSIS — R41.82 AMS (ALTERED MENTAL STATUS): Primary | ICD-10-CM

## 2025-05-14 PROBLEM — R53.1 GENERALIZED WEAKNESS: Status: ACTIVE | Noted: 2025-05-14

## 2025-05-14 PROBLEM — F10.90 ALCOHOL USE DISORDER: Status: ACTIVE | Noted: 2023-09-17

## 2025-05-14 PROBLEM — E03.8 SUBCLINICAL HYPOTHYROIDISM: Status: ACTIVE | Noted: 2025-05-14

## 2025-05-14 PROBLEM — G93.41 ACUTE METABOLIC ENCEPHALOPATHY: Status: ACTIVE | Noted: 2025-05-14

## 2025-05-14 PROBLEM — R79.89 ELEVATED TSH: Status: ACTIVE | Noted: 2025-05-14

## 2025-05-14 LAB
ALBUMIN SERPL BCP-MCNC: 4.4 G/DL (ref 3.4–5)
ALP SERPL-CCNC: 95 U/L (ref 33–136)
ALT SERPL W P-5'-P-CCNC: 12 U/L (ref 7–45)
AMMONIA PLAS-SCNC: 21 UMOL/L (ref 16–53)
AMPHETAMINES UR QL SCN: NORMAL
ANION GAP SERPL CALCULATED.3IONS-SCNC: 12 MMOL/L (ref 10–20)
ANION GAP SERPL CALCULATED.3IONS-SCNC: 15 MMOL/L (ref 10–20)
AST SERPL W P-5'-P-CCNC: 18 U/L (ref 9–39)
BARBITURATES UR QL SCN: NORMAL
BASOPHILS # BLD AUTO: 0.05 X10*3/UL (ref 0–0.1)
BASOPHILS NFR BLD AUTO: 0.6 %
BENZODIAZ UR QL SCN: NORMAL
BILIRUB SERPL-MCNC: 0.6 MG/DL (ref 0–1.2)
BUN SERPL-MCNC: 7 MG/DL (ref 6–23)
BUN SERPL-MCNC: 8 MG/DL (ref 6–23)
BZE UR QL SCN: NORMAL
CALCIUM SERPL-MCNC: 8.6 MG/DL (ref 8.6–10.3)
CALCIUM SERPL-MCNC: 8.9 MG/DL (ref 8.6–10.3)
CANNABINOIDS UR QL SCN: NORMAL
CHLORIDE SERPL-SCNC: 94 MMOL/L (ref 98–107)
CHLORIDE SERPL-SCNC: 95 MMOL/L (ref 98–107)
CO2 SERPL-SCNC: 23 MMOL/L (ref 21–32)
CO2 SERPL-SCNC: 25 MMOL/L (ref 21–32)
CORTIS AM PEAK SERPL-MSCNC: 15.7 UG/DL (ref 5–20)
CREAT SERPL-MCNC: 0.49 MG/DL (ref 0.5–1.05)
CREAT SERPL-MCNC: 0.56 MG/DL (ref 0.5–1.05)
CREAT UR-MCNC: 17.1 MG/DL (ref 20–320)
EGFRCR SERPLBLD CKD-EPI 2021: 88 ML/MIN/1.73M*2
EGFRCR SERPLBLD CKD-EPI 2021: >90 ML/MIN/1.73M*2
EOSINOPHIL # BLD AUTO: 0.06 X10*3/UL (ref 0–0.4)
EOSINOPHIL NFR BLD AUTO: 0.7 %
ERYTHROCYTE [DISTWIDTH] IN BLOOD BY AUTOMATED COUNT: 12.4 % (ref 11.5–14.5)
ERYTHROCYTE [DISTWIDTH] IN BLOOD BY AUTOMATED COUNT: 12.5 % (ref 11.5–14.5)
ETHANOL SERPL-MCNC: <10 MG/DL
FENTANYL+NORFENTANYL UR QL SCN: NORMAL
FOLATE SERPL-MCNC: 21.6 NG/ML
GGT SERPL-CCNC: 19 U/L (ref 5–55)
GLUCOSE SERPL-MCNC: 101 MG/DL (ref 74–99)
GLUCOSE SERPL-MCNC: 111 MG/DL (ref 74–99)
HCT VFR BLD AUTO: 34.7 % (ref 36–46)
HCT VFR BLD AUTO: 35.8 % (ref 36–46)
HGB BLD-MCNC: 12 G/DL (ref 12–16)
HGB BLD-MCNC: 12.5 G/DL (ref 12–16)
IMM GRANULOCYTES # BLD AUTO: 0.03 X10*3/UL (ref 0–0.5)
IMM GRANULOCYTES NFR BLD AUTO: 0.3 % (ref 0–0.9)
LACTATE BLDV-SCNC: 1.1 MMOL/L (ref 0.4–2)
LYMPHOCYTES # BLD AUTO: 2.36 X10*3/UL (ref 0.8–3)
LYMPHOCYTES NFR BLD AUTO: 27.2 %
MAGNESIUM SERPL-MCNC: 2.06 MG/DL (ref 1.6–2.4)
MCH RBC QN AUTO: 30.7 PG (ref 26–34)
MCH RBC QN AUTO: 30.8 PG (ref 26–34)
MCHC RBC AUTO-ENTMCNC: 34.6 G/DL (ref 32–36)
MCHC RBC AUTO-ENTMCNC: 34.9 G/DL (ref 32–36)
MCV RBC AUTO: 88 FL (ref 80–100)
MCV RBC AUTO: 89 FL (ref 80–100)
METHADONE UR QL SCN: NORMAL
MONOCYTES # BLD AUTO: 0.99 X10*3/UL (ref 0.05–0.8)
MONOCYTES NFR BLD AUTO: 11.4 %
NEUTROPHILS # BLD AUTO: 5.2 X10*3/UL (ref 1.6–5.5)
NEUTROPHILS NFR BLD AUTO: 59.8 %
NRBC BLD-RTO: 0 /100 WBCS (ref 0–0)
NRBC BLD-RTO: 0 /100 WBCS (ref 0–0)
OPIATES UR QL SCN: NORMAL
OSMOLALITY SERPL: 268 MOSM/KG (ref 280–300)
OSMOLALITY UR: 186 MOSM/KG (ref 200–1200)
OXYCODONE+OXYMORPHONE UR QL SCN: NORMAL
PCP UR QL SCN: NORMAL
PLATELET # BLD AUTO: 275 X10*3/UL (ref 150–450)
PLATELET # BLD AUTO: 275 X10*3/UL (ref 150–450)
POTASSIUM SERPL-SCNC: 3.4 MMOL/L (ref 3.5–5.3)
POTASSIUM SERPL-SCNC: 3.5 MMOL/L (ref 3.5–5.3)
PROT SERPL-MCNC: 6.8 G/DL (ref 6.4–8.2)
RBC # BLD AUTO: 3.91 X10*6/UL (ref 4–5.2)
RBC # BLD AUTO: 4.06 X10*6/UL (ref 4–5.2)
SODIUM SERPL-SCNC: 128 MMOL/L (ref 136–145)
SODIUM SERPL-SCNC: 129 MMOL/L (ref 136–145)
SODIUM UR-SCNC: 50 MMOL/L
SODIUM/CREAT UR-RTO: 292 MMOL/G CREAT
T4 FREE SERPL-MCNC: 1 NG/DL (ref 0.61–1.12)
TSH SERPL-ACNC: 4.08 MIU/L (ref 0.44–3.98)
WBC # BLD AUTO: 7.4 X10*3/UL (ref 4.4–11.3)
WBC # BLD AUTO: 8.7 X10*3/UL (ref 4.4–11.3)

## 2025-05-14 PROCEDURE — 2060000001 HC INTERMEDIATE ICU ROOM DAILY

## 2025-05-14 PROCEDURE — 82533 TOTAL CORTISOL: CPT | Mod: TRILAB | Performed by: STUDENT IN AN ORGANIZED HEALTH CARE EDUCATION/TRAINING PROGRAM

## 2025-05-14 PROCEDURE — 85025 COMPLETE CBC W/AUTO DIFF WBC: CPT | Performed by: STUDENT IN AN ORGANIZED HEALTH CARE EDUCATION/TRAINING PROGRAM

## 2025-05-14 PROCEDURE — 85027 COMPLETE CBC AUTOMATED: CPT | Performed by: STUDENT IN AN ORGANIZED HEALTH CARE EDUCATION/TRAINING PROGRAM

## 2025-05-14 PROCEDURE — 83735 ASSAY OF MAGNESIUM: CPT | Performed by: STUDENT IN AN ORGANIZED HEALTH CARE EDUCATION/TRAINING PROGRAM

## 2025-05-14 PROCEDURE — 2500000001 HC RX 250 WO HCPCS SELF ADMINISTERED DRUGS (ALT 637 FOR MEDICARE OP): Performed by: STUDENT IN AN ORGANIZED HEALTH CARE EDUCATION/TRAINING PROGRAM

## 2025-05-14 PROCEDURE — 36415 COLL VENOUS BLD VENIPUNCTURE: CPT | Performed by: STUDENT IN AN ORGANIZED HEALTH CARE EDUCATION/TRAINING PROGRAM

## 2025-05-14 PROCEDURE — 2500000004 HC RX 250 GENERAL PHARMACY W/ HCPCS (ALT 636 FOR OP/ED): Performed by: STUDENT IN AN ORGANIZED HEALTH CARE EDUCATION/TRAINING PROGRAM

## 2025-05-14 PROCEDURE — 93010 ELECTROCARDIOGRAM REPORT: CPT | Performed by: INTERNAL MEDICINE

## 2025-05-14 PROCEDURE — 99232 SBSQ HOSP IP/OBS MODERATE 35: CPT | Performed by: NURSE PRACTITIONER

## 2025-05-14 PROCEDURE — 99233 SBSQ HOSP IP/OBS HIGH 50: CPT | Performed by: STUDENT IN AN ORGANIZED HEALTH CARE EDUCATION/TRAINING PROGRAM

## 2025-05-14 PROCEDURE — 82077 ASSAY SPEC XCP UR&BREATH IA: CPT | Performed by: STUDENT IN AN ORGANIZED HEALTH CARE EDUCATION/TRAINING PROGRAM

## 2025-05-14 PROCEDURE — 97535 SELF CARE MNGMENT TRAINING: CPT | Mod: GO

## 2025-05-14 PROCEDURE — 83605 ASSAY OF LACTIC ACID: CPT | Performed by: STUDENT IN AN ORGANIZED HEALTH CARE EDUCATION/TRAINING PROGRAM

## 2025-05-14 PROCEDURE — 82977 ASSAY OF GGT: CPT | Mod: TRILAB | Performed by: STUDENT IN AN ORGANIZED HEALTH CARE EDUCATION/TRAINING PROGRAM

## 2025-05-14 PROCEDURE — 80307 DRUG TEST PRSMV CHEM ANLYZR: CPT | Performed by: STUDENT IN AN ORGANIZED HEALTH CARE EDUCATION/TRAINING PROGRAM

## 2025-05-14 PROCEDURE — 83930 ASSAY OF BLOOD OSMOLALITY: CPT | Mod: TRILAB | Performed by: STUDENT IN AN ORGANIZED HEALTH CARE EDUCATION/TRAINING PROGRAM

## 2025-05-14 PROCEDURE — 83935 ASSAY OF URINE OSMOLALITY: CPT | Mod: TRILAB | Performed by: STUDENT IN AN ORGANIZED HEALTH CARE EDUCATION/TRAINING PROGRAM

## 2025-05-14 PROCEDURE — 83921 ORGANIC ACID SINGLE QUANT: CPT | Performed by: STUDENT IN AN ORGANIZED HEALTH CARE EDUCATION/TRAINING PROGRAM

## 2025-05-14 PROCEDURE — 80053 COMPREHEN METABOLIC PANEL: CPT | Performed by: STUDENT IN AN ORGANIZED HEALTH CARE EDUCATION/TRAINING PROGRAM

## 2025-05-14 PROCEDURE — 80048 BASIC METABOLIC PNL TOTAL CA: CPT | Mod: CCI | Performed by: STUDENT IN AN ORGANIZED HEALTH CARE EDUCATION/TRAINING PROGRAM

## 2025-05-14 PROCEDURE — 82570 ASSAY OF URINE CREATININE: CPT | Performed by: STUDENT IN AN ORGANIZED HEALTH CARE EDUCATION/TRAINING PROGRAM

## 2025-05-14 PROCEDURE — 82746 ASSAY OF FOLIC ACID SERUM: CPT | Mod: TRILAB | Performed by: STUDENT IN AN ORGANIZED HEALTH CARE EDUCATION/TRAINING PROGRAM

## 2025-05-14 PROCEDURE — 93005 ELECTROCARDIOGRAM TRACING: CPT

## 2025-05-14 PROCEDURE — 84443 ASSAY THYROID STIM HORMONE: CPT | Performed by: STUDENT IN AN ORGANIZED HEALTH CARE EDUCATION/TRAINING PROGRAM

## 2025-05-14 PROCEDURE — 84439 ASSAY OF FREE THYROXINE: CPT | Performed by: STUDENT IN AN ORGANIZED HEALTH CARE EDUCATION/TRAINING PROGRAM

## 2025-05-14 PROCEDURE — 97165 OT EVAL LOW COMPLEX 30 MIN: CPT | Mod: GO

## 2025-05-14 PROCEDURE — 82140 ASSAY OF AMMONIA: CPT | Performed by: STUDENT IN AN ORGANIZED HEALTH CARE EDUCATION/TRAINING PROGRAM

## 2025-05-14 PROCEDURE — 87040 BLOOD CULTURE FOR BACTERIA: CPT | Mod: TRILAB | Performed by: STUDENT IN AN ORGANIZED HEALTH CARE EDUCATION/TRAINING PROGRAM

## 2025-05-14 PROCEDURE — 97161 PT EVAL LOW COMPLEX 20 MIN: CPT | Mod: GP

## 2025-05-14 RX ORDER — ACETAMINOPHEN 650 MG/1
650 SUPPOSITORY RECTAL EVERY 4 HOURS PRN
Status: DISCONTINUED | OUTPATIENT
Start: 2025-05-14 | End: 2025-05-16 | Stop reason: HOSPADM

## 2025-05-14 RX ORDER — AMLODIPINE BESYLATE 10 MG/1
10 TABLET ORAL DAILY
Status: DISCONTINUED | OUTPATIENT
Start: 2025-05-14 | End: 2025-05-16 | Stop reason: HOSPADM

## 2025-05-14 RX ORDER — POLYETHYLENE GLYCOL 3350 17 G/17G
17 POWDER, FOR SOLUTION ORAL DAILY
Status: DISCONTINUED | OUTPATIENT
Start: 2025-05-14 | End: 2025-05-16 | Stop reason: HOSPADM

## 2025-05-14 RX ORDER — FAMOTIDINE 20 MG/1
20 TABLET, FILM COATED ORAL 2 TIMES DAILY
Status: DISCONTINUED | OUTPATIENT
Start: 2025-05-14 | End: 2025-05-16 | Stop reason: HOSPADM

## 2025-05-14 RX ORDER — LORAZEPAM 2 MG/ML
1 INJECTION INTRAMUSCULAR EVERY 2 HOUR PRN
Status: DISCONTINUED | OUTPATIENT
Start: 2025-05-14 | End: 2025-05-16 | Stop reason: HOSPADM

## 2025-05-14 RX ORDER — ALPRAZOLAM 0.25 MG/1
0.25 TABLET ORAL NIGHTLY PRN
Status: DISCONTINUED | OUTPATIENT
Start: 2025-05-14 | End: 2025-05-16 | Stop reason: HOSPADM

## 2025-05-14 RX ORDER — LORAZEPAM 2 MG/ML
2 INJECTION INTRAMUSCULAR EVERY 2 HOUR PRN
Status: DISCONTINUED | OUTPATIENT
Start: 2025-05-14 | End: 2025-05-16 | Stop reason: HOSPADM

## 2025-05-14 RX ORDER — ACETAMINOPHEN 160 MG/5ML
650 SOLUTION ORAL EVERY 4 HOURS PRN
Status: DISCONTINUED | OUTPATIENT
Start: 2025-05-14 | End: 2025-05-16 | Stop reason: HOSPADM

## 2025-05-14 RX ORDER — METOPROLOL SUCCINATE 50 MG/1
50 TABLET, EXTENDED RELEASE ORAL DAILY
Status: DISCONTINUED | OUTPATIENT
Start: 2025-05-14 | End: 2025-05-16 | Stop reason: HOSPADM

## 2025-05-14 RX ORDER — ONDANSETRON 4 MG/1
4 TABLET, ORALLY DISINTEGRATING ORAL EVERY 8 HOURS PRN
Status: DISCONTINUED | OUTPATIENT
Start: 2025-05-14 | End: 2025-05-16 | Stop reason: HOSPADM

## 2025-05-14 RX ORDER — ATORVASTATIN CALCIUM 10 MG/1
10 TABLET, FILM COATED ORAL DAILY
Status: DISCONTINUED | OUTPATIENT
Start: 2025-05-14 | End: 2025-05-16 | Stop reason: HOSPADM

## 2025-05-14 RX ORDER — FOLIC ACID 1 MG/1
1 TABLET ORAL DAILY
Status: DISCONTINUED | OUTPATIENT
Start: 2025-05-14 | End: 2025-05-16 | Stop reason: HOSPADM

## 2025-05-14 RX ORDER — FAMOTIDINE 10 MG/ML
20 INJECTION, SOLUTION INTRAVENOUS 2 TIMES DAILY
Status: DISCONTINUED | OUTPATIENT
Start: 2025-05-14 | End: 2025-05-16 | Stop reason: HOSPADM

## 2025-05-14 RX ORDER — ONDANSETRON HYDROCHLORIDE 2 MG/ML
4 INJECTION, SOLUTION INTRAVENOUS EVERY 8 HOURS PRN
Status: DISCONTINUED | OUTPATIENT
Start: 2025-05-14 | End: 2025-05-16 | Stop reason: HOSPADM

## 2025-05-14 RX ORDER — LORAZEPAM 2 MG/ML
0.5 INJECTION INTRAMUSCULAR EVERY 2 HOUR PRN
Status: DISCONTINUED | OUTPATIENT
Start: 2025-05-14 | End: 2025-05-16 | Stop reason: HOSPADM

## 2025-05-14 RX ORDER — MULTIVIT-MIN/IRON FUM/FOLIC AC 7.5 MG-4
1 TABLET ORAL DAILY
Status: DISCONTINUED | OUTPATIENT
Start: 2025-05-14 | End: 2025-05-16 | Stop reason: HOSPADM

## 2025-05-14 RX ORDER — MIRTAZAPINE 15 MG/1
15 TABLET, FILM COATED ORAL NIGHTLY
Status: DISCONTINUED | OUTPATIENT
Start: 2025-05-14 | End: 2025-05-16 | Stop reason: HOSPADM

## 2025-05-14 RX ORDER — ACETAMINOPHEN 325 MG/1
650 TABLET ORAL EVERY 4 HOURS PRN
Status: DISCONTINUED | OUTPATIENT
Start: 2025-05-14 | End: 2025-05-16 | Stop reason: HOSPADM

## 2025-05-14 RX ORDER — THIAMINE HYDROCHLORIDE 100 MG/ML
100 INJECTION, SOLUTION INTRAMUSCULAR; INTRAVENOUS DAILY
Status: COMPLETED | OUTPATIENT
Start: 2025-05-14 | End: 2025-05-16

## 2025-05-14 RX ORDER — LANOLIN ALCOHOL/MO/W.PET/CERES
100 CREAM (GRAM) TOPICAL DAILY
Status: DISCONTINUED | OUTPATIENT
Start: 2025-05-17 | End: 2025-05-16 | Stop reason: HOSPADM

## 2025-05-14 RX ADMIN — FAMOTIDINE 20 MG: 20 TABLET, FILM COATED ORAL at 08:31

## 2025-05-14 RX ADMIN — MIRTAZAPINE 15 MG: 15 TABLET, FILM COATED ORAL at 20:25

## 2025-05-14 RX ADMIN — FOLIC ACID 1 MG: 1 TABLET ORAL at 08:31

## 2025-05-14 RX ADMIN — AMLODIPINE BESYLATE 10 MG: 10 TABLET ORAL at 08:32

## 2025-05-14 RX ADMIN — ATORVASTATIN CALCIUM 10 MG: 10 TABLET, FILM COATED ORAL at 08:31

## 2025-05-14 RX ADMIN — FAMOTIDINE 20 MG: 20 TABLET, FILM COATED ORAL at 20:25

## 2025-05-14 RX ADMIN — FAMOTIDINE 20 MG: 20 TABLET, FILM COATED ORAL at 01:49

## 2025-05-14 RX ADMIN — METOPROLOL SUCCINATE 50 MG: 50 TABLET, EXTENDED RELEASE ORAL at 08:31

## 2025-05-14 RX ADMIN — THIAMINE HYDROCHLORIDE 100 MG: 100 INJECTION, SOLUTION INTRAMUSCULAR; INTRAVENOUS at 08:31

## 2025-05-14 RX ADMIN — MIRTAZAPINE 15 MG: 15 TABLET, FILM COATED ORAL at 01:49

## 2025-05-14 RX ADMIN — Medication 1 TABLET: at 08:31

## 2025-05-14 SDOH — HEALTH STABILITY: PHYSICAL HEALTH: ON AVERAGE, HOW MANY DAYS PER WEEK DO YOU ENGAGE IN MODERATE TO STRENUOUS EXERCISE (LIKE A BRISK WALK)?: 0 DAYS

## 2025-05-14 SDOH — ECONOMIC STABILITY: HOUSING INSECURITY: AT ANY TIME IN THE PAST 12 MONTHS, WERE YOU HOMELESS OR LIVING IN A SHELTER (INCLUDING NOW)?: NO

## 2025-05-14 SDOH — ECONOMIC STABILITY: INCOME INSECURITY: IN THE PAST 12 MONTHS HAS THE ELECTRIC, GAS, OIL, OR WATER COMPANY THREATENED TO SHUT OFF SERVICES IN YOUR HOME?: NO

## 2025-05-14 SDOH — SOCIAL STABILITY: SOCIAL INSECURITY: HAS ANYONE EVER THREATENED TO HURT YOUR FAMILY OR YOUR PETS?: NO

## 2025-05-14 SDOH — SOCIAL STABILITY: SOCIAL INSECURITY: WITHIN THE LAST YEAR, HAVE YOU BEEN HUMILIATED OR EMOTIONALLY ABUSED IN OTHER WAYS BY YOUR PARTNER OR EX-PARTNER?: NO

## 2025-05-14 SDOH — ECONOMIC STABILITY: HOUSING INSECURITY: IN THE LAST 12 MONTHS, WAS THERE A TIME WHEN YOU WERE NOT ABLE TO PAY THE MORTGAGE OR RENT ON TIME?: NO

## 2025-05-14 SDOH — HEALTH STABILITY: PHYSICAL HEALTH
HOW OFTEN DO YOU NEED TO HAVE SOMEONE HELP YOU WHEN YOU READ INSTRUCTIONS, PAMPHLETS, OR OTHER WRITTEN MATERIAL FROM YOUR DOCTOR OR PHARMACY?: NEVER

## 2025-05-14 SDOH — SOCIAL STABILITY: SOCIAL INSECURITY
WITHIN THE LAST YEAR, HAVE YOU BEEN KICKED, HIT, SLAPPED, OR OTHERWISE PHYSICALLY HURT BY YOUR PARTNER OR EX-PARTNER?: NO

## 2025-05-14 SDOH — HEALTH STABILITY: MENTAL HEALTH: HOW OFTEN DO YOU HAVE SIX OR MORE DRINKS ON ONE OCCASION?: NEVER

## 2025-05-14 SDOH — HEALTH STABILITY: MENTAL HEALTH
DO YOU FEEL STRESS - TENSE, RESTLESS, NERVOUS, OR ANXIOUS, OR UNABLE TO SLEEP AT NIGHT BECAUSE YOUR MIND IS TROUBLED ALL THE TIME - THESE DAYS?: TO SOME EXTENT

## 2025-05-14 SDOH — ECONOMIC STABILITY: FOOD INSECURITY: WITHIN THE PAST 12 MONTHS, YOU WORRIED THAT YOUR FOOD WOULD RUN OUT BEFORE YOU GOT THE MONEY TO BUY MORE.: NEVER TRUE

## 2025-05-14 SDOH — SOCIAL STABILITY: SOCIAL INSECURITY: DO YOU FEEL ANYONE HAS EXPLOITED OR TAKEN ADVANTAGE OF YOU FINANCIALLY OR OF YOUR PERSONAL PROPERTY?: NO

## 2025-05-14 SDOH — ECONOMIC STABILITY: FOOD INSECURITY: WITHIN THE PAST 12 MONTHS, THE FOOD YOU BOUGHT JUST DIDN'T LAST AND YOU DIDN'T HAVE MONEY TO GET MORE.: NEVER TRUE

## 2025-05-14 SDOH — SOCIAL STABILITY: SOCIAL NETWORK: HOW OFTEN DO YOU ATTEND MEETINGS OF THE CLUBS OR ORGANIZATIONS YOU BELONG TO?: NEVER

## 2025-05-14 SDOH — HEALTH STABILITY: MENTAL HEALTH: HOW MANY DRINKS CONTAINING ALCOHOL DO YOU HAVE ON A TYPICAL DAY WHEN YOU ARE DRINKING?: 1 OR 2

## 2025-05-14 SDOH — SOCIAL STABILITY: SOCIAL INSECURITY: ABUSE: ADULT

## 2025-05-14 SDOH — SOCIAL STABILITY: SOCIAL NETWORK: HOW OFTEN DO YOU GET TOGETHER WITH FRIENDS OR RELATIVES?: THREE TIMES A WEEK

## 2025-05-14 SDOH — SOCIAL STABILITY: SOCIAL INSECURITY: WITHIN THE LAST YEAR, HAVE YOU BEEN AFRAID OF YOUR PARTNER OR EX-PARTNER?: NO

## 2025-05-14 SDOH — SOCIAL STABILITY: SOCIAL INSECURITY
WITHIN THE LAST YEAR, HAVE YOU BEEN RAPED OR FORCED TO HAVE ANY KIND OF SEXUAL ACTIVITY BY YOUR PARTNER OR EX-PARTNER?: NO

## 2025-05-14 SDOH — HEALTH STABILITY: MENTAL HEALTH: HOW OFTEN DO YOU HAVE A DRINK CONTAINING ALCOHOL?: 4 OR MORE TIMES A WEEK

## 2025-05-14 SDOH — SOCIAL STABILITY: SOCIAL INSECURITY: ARE THERE ANY APPARENT SIGNS OF INJURIES/BEHAVIORS THAT COULD BE RELATED TO ABUSE/NEGLECT?: NO

## 2025-05-14 SDOH — SOCIAL STABILITY: SOCIAL NETWORK
DO YOU BELONG TO ANY CLUBS OR ORGANIZATIONS SUCH AS CHURCH GROUPS, UNIONS, FRATERNAL OR ATHLETIC GROUPS, OR SCHOOL GROUPS?: NO

## 2025-05-14 SDOH — ECONOMIC STABILITY: HOUSING INSECURITY: IN THE PAST 12 MONTHS, HOW MANY TIMES HAVE YOU MOVED WHERE YOU WERE LIVING?: 0

## 2025-05-14 SDOH — ECONOMIC STABILITY: TRANSPORTATION INSECURITY: IN THE PAST 12 MONTHS, HAS LACK OF TRANSPORTATION KEPT YOU FROM MEDICAL APPOINTMENTS OR FROM GETTING MEDICATIONS?: NO

## 2025-05-14 SDOH — ECONOMIC STABILITY: FOOD INSECURITY: HOW HARD IS IT FOR YOU TO PAY FOR THE VERY BASICS LIKE FOOD, HOUSING, MEDICAL CARE, AND HEATING?: NOT HARD AT ALL

## 2025-05-14 SDOH — SOCIAL STABILITY: SOCIAL INSECURITY: ARE YOU MARRIED, WIDOWED, DIVORCED, SEPARATED, NEVER MARRIED, OR LIVING WITH A PARTNER?: WIDOWED

## 2025-05-14 SDOH — SOCIAL STABILITY: SOCIAL INSECURITY: DO YOU FEEL UNSAFE GOING BACK TO THE PLACE WHERE YOU ARE LIVING?: NO

## 2025-05-14 SDOH — SOCIAL STABILITY: SOCIAL NETWORK: IN A TYPICAL WEEK, HOW MANY TIMES DO YOU TALK ON THE PHONE WITH FAMILY, FRIENDS, OR NEIGHBORS?: THREE TIMES A WEEK

## 2025-05-14 SDOH — HEALTH STABILITY: PHYSICAL HEALTH: ON AVERAGE, HOW MANY MINUTES DO YOU ENGAGE IN EXERCISE AT THIS LEVEL?: 0 MIN

## 2025-05-14 SDOH — SOCIAL STABILITY: SOCIAL INSECURITY: DOES ANYONE TRY TO KEEP YOU FROM HAVING/CONTACTING OTHER FRIENDS OR DOING THINGS OUTSIDE YOUR HOME?: NO

## 2025-05-14 SDOH — SOCIAL STABILITY: SOCIAL INSECURITY: ARE YOU OR HAVE YOU BEEN THREATENED OR ABUSED PHYSICALLY, EMOTIONALLY, OR SEXUALLY BY ANYONE?: NO

## 2025-05-14 SDOH — SOCIAL STABILITY: SOCIAL INSECURITY: WERE YOU ABLE TO COMPLETE ALL THE BEHAVIORAL HEALTH SCREENINGS?: YES

## 2025-05-14 SDOH — SOCIAL STABILITY: SOCIAL NETWORK: HOW OFTEN DO YOU ATTEND CHURCH OR RELIGIOUS SERVICES?: NEVER

## 2025-05-14 SDOH — SOCIAL STABILITY: SOCIAL INSECURITY: HAVE YOU HAD THOUGHTS OF HARMING ANYONE ELSE?: NO

## 2025-05-14 ASSESSMENT — COGNITIVE AND FUNCTIONAL STATUS - GENERAL
WALKING IN HOSPITAL ROOM: A LITTLE
CLIMB 3 TO 5 STEPS WITH RAILING: A LOT
WALKING IN HOSPITAL ROOM: A LITTLE
MOVING FROM LYING ON BACK TO SITTING ON SIDE OF FLAT BED WITH BEDRAILS: A LITTLE
MOBILITY SCORE: 17
STANDING UP FROM CHAIR USING ARMS: A LITTLE
WALKING IN HOSPITAL ROOM: A LITTLE
DAILY ACTIVITIY SCORE: 24
DAILY ACTIVITIY SCORE: 19
STANDING UP FROM CHAIR USING ARMS: A LITTLE
DRESSING REGULAR LOWER BODY CLOTHING: A LITTLE
DAILY ACTIVITIY SCORE: 24
MOVING TO AND FROM BED TO CHAIR: A LITTLE
TURNING FROM BACK TO SIDE WHILE IN FLAT BAD: A LITTLE
MOBILITY SCORE: 22
DRESSING REGULAR UPPER BODY CLOTHING: A LITTLE
HELP NEEDED FOR BATHING: A LITTLE
PERSONAL GROOMING: A LITTLE
PATIENT BASELINE BEDBOUND: NO
MOBILITY SCORE: 20
MOVING FROM LYING ON BACK TO SITTING ON SIDE OF FLAT BED WITH BEDRAILS: A LITTLE
DAILY ACTIVITIY SCORE: 24
TOILETING: A LITTLE
MOBILITY SCORE: 18
WALKING IN HOSPITAL ROOM: A LITTLE
MOVING TO AND FROM BED TO CHAIR: A LITTLE
STANDING UP FROM CHAIR USING ARMS: A LITTLE
CLIMB 3 TO 5 STEPS WITH RAILING: A LITTLE
CLIMB 3 TO 5 STEPS WITH RAILING: A LITTLE
CLIMB 3 TO 5 STEPS WITH RAILING: A LOT
TURNING FROM BACK TO SIDE WHILE IN FLAT BAD: A LITTLE

## 2025-05-14 ASSESSMENT — ACTIVITIES OF DAILY LIVING (ADL)
ASSISTIVE_DEVICE: WALKER
DRESSING YOURSELF: INDEPENDENT
ADEQUATE_TO_COMPLETE_ADL: YES
FEEDING YOURSELF: INDEPENDENT
HOME_MANAGEMENT_TIME_ENTRY: 8
JUDGMENT_ADEQUATE_SAFELY_COMPLETE_DAILY_ACTIVITIES: YES
WALKS IN HOME: NEEDS ASSISTANCE
PATIENT'S MEMORY ADEQUATE TO SAFELY COMPLETE DAILY ACTIVITIES?: UNABLE TO ASSESS
TOILETING: NEEDS ASSISTANCE
ADL_ASSISTANCE: INDEPENDENT
LACK_OF_TRANSPORTATION: NO
GROOMING: INDEPENDENT
HEARING - RIGHT EAR: FUNCTIONAL
ADL_ASSISTANCE: INDEPENDENT
HEARING - LEFT EAR: FUNCTIONAL
LACK_OF_TRANSPORTATION: NO
BATHING_ASSISTANCE: MINIMAL
BATHING: NEEDS ASSISTANCE
LACK_OF_TRANSPORTATION: NO

## 2025-05-14 ASSESSMENT — PAIN - FUNCTIONAL ASSESSMENT
PAIN_FUNCTIONAL_ASSESSMENT: 0-10

## 2025-05-14 ASSESSMENT — PATIENT HEALTH QUESTIONNAIRE - PHQ9
SUM OF ALL RESPONSES TO PHQ9 QUESTIONS 1 & 2: 0
1. LITTLE INTEREST OR PLEASURE IN DOING THINGS: NOT AT ALL
2. FEELING DOWN, DEPRESSED OR HOPELESS: NOT AT ALL

## 2025-05-14 ASSESSMENT — LIFESTYLE VARIABLES
HOW OFTEN DO YOU HAVE A DRINK CONTAINING ALCOHOL: NEVER
AUDIT-C TOTAL SCORE: 0
SKIP TO QUESTIONS 9-10: 1
HOW MANY STANDARD DRINKS CONTAINING ALCOHOL DO YOU HAVE ON A TYPICAL DAY: PATIENT DOES NOT DRINK
AUDIT-C TOTAL SCORE: 4
AUDIT-C TOTAL SCORE: 0
HOW OFTEN DO YOU HAVE 6 OR MORE DRINKS ON ONE OCCASION: NEVER
SKIP TO QUESTIONS 9-10: 1

## 2025-05-14 ASSESSMENT — PAIN SCALES - GENERAL
PAINLEVEL_OUTOF10: 0 - NO PAIN

## 2025-05-14 NOTE — PROGRESS NOTES
Physical Therapy    Physical Therapy Evaluation    Patient Name: Stephanie Jimenez  MRN: [id number]  Department: [address]  Room: [facility]315A  Today's Date: 5/14/2025   Time Calculation  Start Time: 0848  Stop Time: 0858  Time Calculation (min): 10 min    Assessment/Plan   PT Assessment  PT Assessment Results: Decreased strength, Impaired balance, Decreased mobility, Decreased cognition, Impaired judgement, Decreased safety awareness  Rehab Prognosis: Good  Barriers to Discharge Home: No anticipated barriers  Evaluation/Treatment Tolerance: Patient tolerated treatment well  Medical Staff Made Aware: Yes  Strengths: Premorbid level of function  Barriers to Participation: Comorbidities  End of Session Communication: Bedside nurse  Assessment Comment: Pt moving w/ CGA to close supervision, slightly decreased generalized functional strength, requries cues for safety w/ mobility. Pt would benefit from continued PT services to progress safe functional mobility toward PLOF.  End of Session Patient Position: Up in chair, Alarm on  IP OR SWING BED PT PLAN  Inpatient or Swing Bed: Inpatient  PT Plan  Treatment/Interventions: Bed mobility, Transfer training, Gait training, Balance training, Strengthening, Endurance training, Therapeutic exercise, Therapeutic activity  PT Plan: Ongoing PT  PT Frequency: 4 times per week  PT Discharge Recommendations: Low intensity level of continued care, 24 hr supervision due to cognition  Equipment Recommended upon Discharge: Wheeled walker  PT Recommended Transfer Status: Assist x1, Assistive device  PT - OK to Discharge: Yes    Subjective     PT Visit Info:  PT Received On: 05/14/25  General Visit Information:  General  Reason for Referral: impaired mobility, generalied weakness  Referred By: Dr Quezada  Past Medical History Relevant to Rehab: anxiety, depression, ETOH, a-fib, HTN, HLD, asthma, pre-DM, osteoporosis  Family/Caregiver Present: No  Patient Position Received: Up in chair, Alarm  "on  Preferred Learning Style: verbal, visual  General Comment: Pt is an 89 y.o. female adm from AL facility for generalized weakness, fatigue, malaise, HA and increased confusion. Pt reports feeling back to her norm at this time. Pt agreeable to PT blaiseal.  Home Living:  Home Living  Type of Home: Assisted living  Lives With:  (care staff)  Home Adaptive Equipment: Walker rolling or standard, Cane  Home Layout: One level  Home Access: Level entry  Bathroom Shower/Tub: Walk-in shower  Bathroom Toilet: Adaptive toilet seating  Bathroom Equipment: Grab bars in shower, Built-in shower seat  Home Living Comments: Pt reports recently moving to [facility]  Prior Level of Function:  Prior Function Per Pt/Caregiver Report  Level of [place]: Independent with ADLs and functional transfers  Receives Help From: Other (Comment) (care staff)  ADL Assistance: Independent  Homemaking Assistance:  (facility manages)  Ambulatory Assistance: Independent (uses a 2 wheeled walker long distances and in the community)  Hand Dominance: Right  Prior Function Comments: Pt reports independence with ADLs and not using an A.D. \"in the apartment.\"  Precautions:  Precautions  Hearing/Visual Limitations: +corrective lenses for reading; hearing intact  Medical Precautions: Fall precautions         Objective   Pain:  Pain Assessment  Pain Assessment: 0-10  0-10 (Numeric) Pain Score: 0 - No pain  Cognition:  Cognition  Overall Cognitive Status: Within Functional Limits (grossly)  Orientation Level: Oriented X4  Following Commands: Follows one step commands without difficulty  Cognition Comments: pleasant, cooperative, poor historian  Safety/Judgement:  (impaired)  Insight: Mild  Impulsive: Mildly    General Assessments:     Activity Tolerance  Endurance: Endurance does not limit participation in activity  Activity Tolerance Comments: Fair    Sensation  Sensation Comment: Denies tingling/numbness    Strength  Strength Comments: BLEs 3+/5 or " >  Coordination  Coordination Comment: slightly decreased speed and accuracy    Postural Control  Posture Comment: forward flexed    Dynamic Standing Balance  Dynamic Standing-Balance Support: Bilateral upper extremity supported  Dynamic Standing-Level of Assistance: Close supervision, Contact guard  Dynamic Standing-Balance: Turning (amb)  Functional Assessments:       Transfer 1  Technique 1: Sit to stand, Stand to sit  Transfer Device 1: Walker  Transfer Level of Assistance 1: Close supervision  Trials/Comments 1: from/to chair w/ arms, slow to rise, cues for safe hand placement w/ return to sit as well as keeing RW close until ready to sit down.    Ambulation/Gait Training  Ambulation/Gait Training Performed: Yes  Ambulation/Gait Training 1  Surface 1: Level tile  Device 1: Rolling walker  Assistance 1: Contact guard, Close supervision  Comments/Distance (ft) 1: Pt amb ~ 200' x 1, fair pace, forward flexed posture allowing RW to get slightly ahead of her but able to maintain control w/o LOB. Pt denied fatigue. Cues to keep RW in use until lined up w/ chair and ready to sit down.  Extremity/Trunk Assessments:  RLE   RLE : Within Functional Limits  LLE   LLE : Within Functional Limits  Outcome Measures:  [facility] Basic Mobility  Turning from your back to your side while in a flat bed without using bedrails: A little  Moving from lying on your back to sitting on the side of a flat bed without using bedrails: A little  Moving to and from bed to chair (including a wheelchair): A little  Standing up from a chair using your arms (e.g. wheelchair or bedside chair): A little  To walk in hospital room: A little  Climbing 3-5 steps with railing: A little  Basic Mobility - Total Score: 18    Encounter Problems       Encounter Problems (Active)       Mobility       STG - Patient will ambulate 300' w/ RW and distant supervision  (Progressing)       Start:  05/14/25    Expected End:  05/23/25            Pt will consistently  tolerate BLE exercises and/or therapeutic activities to promote functional strength, balance, endurance and safe mobility (Progressing)       Start:  05/14/25    Expected End:  05/23/25               PT Transfers       STG - Patient to transfer to and from sit to supine MOD I (Progressing)       Start:  05/14/25    Expected End:  05/23/25            STG - Patient will transfer sit to and from stand MOD I w/ safe technique (Progressing)       Start:  05/14/25    Expected End:  05/23/25                   Education Documentation  Precautions, taught by Lala Palma, PT at 5/14/2025 12:01 PM.  Learner: Patient  Readiness: Acceptance  Method: Explanation, Demonstration  Response: Verbalizes Understanding, Needs Reinforcement  Comment: safety awareness and technique    Mobility Training, taught by Lala Palma, PT at 5/14/2025 12:01 PM.  Learner: Patient  Readiness: Acceptance  Method: Explanation, Demonstration  Response: Verbalizes Understanding, Needs Reinforcement  Comment: safety awareness and technique    Education Comments  No comments found.

## 2025-05-14 NOTE — ASSESSMENT & PLAN NOTE
EKG normal sinus rhythm, left axis deviation, LVH, 80 bpm, QTc 447  Not on anticoagulation  Continue metoprolol succinate 50 mg daily  Magnesium 2.06

## 2025-05-14 NOTE — CONSULTS
CONSULT: Nephrology SERVICE    SERVICE DATE: 5/14/2025   SERVICE TIME:  10:12 AM    REASON FOR CONSULT: Hyponatremia  REQUESTING PHYSICIAN: Dr. Quezada  PRIMARY CARE PHYSICIAN: Henri Ordaz MD    ASSESSMENT AND PLAN  89-year-old woman with asthma and hypertension coming in with alteration mental status associated with hyponatremia.  1.  Hyponatremia  2.  Essential hypertension    She has intact serum creatinine, no major electrolyte disturbances save the hyponatremia.  This appears to be an acute on chronic picture.  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  She is euvolemic, no other obvious etiologies of hyponatremia.  I do not see any medications commonly implicated in hyponatremia, save the mirtazapine possibly.  I would like to try gentle fluid restriction of 1.5 L in 24 hours.  I do not think she needs any kind of other escalation of therapy.  This hyponatremia has improved spontaneously with no specific therapy that I can see.  It does not look like she received IV fluids.  Thyroid balance looks reasonably appropriate.  I doubt this is adrenal insufficiency.  Trend daily labs, continue supportive care.  Hopefully she should be able to turn around within 24 hours as long as she has sustained improvement in her mental status.  I thank you for the consultation.  I will continue to follow this patient.       SUBJECTIVE  Ms. Jimenez is a 89 y.o. woman with a history of asthma and hypertension presenting with alteration mental status, consulted for hyponatremia.  This seems to be a chronic hyponatremia, I reviewed numerous years of laboratory data in preparation for this visit.  This woman typically ranges with the serum sodium between 128 and 133.  She claims that she has never been evaluated by a nephrologist in the past for this.  She lives in assisted living.  Family noticed that she had confusion, worsening mental status, brought her into the hospital.  She was initially seen at [facility] and had a sodium of 123.  There  "was concern for alcohol withdrawal.  Repeat sodium came back at 128.  The patient says that the only major change in her symptoms were worsening headache over the past week.  This was associated with nausea.  She said that despite that she had good oral intake, which included both solids and liquids.  Upon review of her liquid diarrhea, it seems that she drinks at least 2 cups of coffee per day, about 2 L of water, a lot of soda, and occasionally 2 to 3 glasses of whiskey.  She does not take diuretics, antiepileptics.  She has no swelling, no dyspnea, no diarrhea, no vomiting, no dysuria.    PAST MEDICAL HISTORY: Medical History[1]  PAST SURGICAL HISTORY: Surgical History[2]  FAMILY HISTORY: Family History[3]  SOCIAL HISTORY: Social History[4]  MEDICATIONS:  Scheduled Medications[5]   Continuous Medications[6]   PRN Medications[7]   CURRENT ALLERGIES:   Allergies as of 05/13/2025    (No Known Allergies)       COMPLETE REVIEW OF SYSTEMS:    Full ROS was negative unless mentioned above    OBJECTIVE  PHYSICAL EXAM  Heart Rate:  [79-82]   Temp:  [36.4 °C (97.5 °F)-37.2 °C (99 °F)]   Resp:  [16-22]   BP: (129-158)/(61-72)   Height:  [165.1 cm (5' 5\")]   Weight:  [60.3 kg (133 lb)]   SpO2:  [98 %]    Body mass index is 22.13 kg/m².  This is an elderly white woman who appears in no acute distress  Somewhat pale skin  Hearing intact  Phonation intact  Moist mucosa  Normal S1/normal S2  Lungs are clear to auscultation bilaterally  Abdomen is soft, nondistended, nontender, positive bowel sounds  No Sanchez catheter in place, no suprapubic tenderness to palpation  No extremity edema  Moves 4 limbs spontaneously  No obvious joint deformities  No lymphadenopathy    DATA:   Labs:  Results for orders placed or performed during the hospital encounter of 05/14/25 (from the past 96 hours)   Drug Screen, Urine With Reflex to Confirmation   Result Value Ref Range    Amphetamine Screen, Urine Presumptive Negative Presumptive Negative    " Barbiturate Screen, Urine Presumptive Negative Presumptive Negative    Benzodiazepines Screen, Urine Presumptive Negative Presumptive Negative    Cannabinoid Screen, Urine Presumptive Negative Presumptive Negative    Cocaine Metabolite Screen, Urine Presumptive Negative Presumptive Negative    Fentanyl Screen, Urine Presumptive Negative Presumptive Negative    Opiate Screen, Urine Presumptive Negative Presumptive Negative    Oxycodone Screen, Urine Presumptive Negative Presumptive Negative    PCP Screen, Urine Presumptive Negative Presumptive Negative    Methadone Screen, Urine Presumptive Negative Presumptive Negative   Sodium, Urine Random   Result Value Ref Range    Sodium, Urine Random 50 mmol/L    Creatinine, Urine Random 17.1 (L) 20.0 - 320.0 mg/dL    Sodium/Creatinine Ratio 292 Not established. mmol/g Creat   ECG 12 lead   Result Value Ref Range    Ventricular Rate 80 BPM    Atrial Rate 80 BPM    CA Interval 162 ms    QRS Duration 90 ms    QT Interval 388 ms    QTC Calculation(Bazett) 447 ms    P Axis 16 degrees    R Axis -42 degrees    T Axis 48 degrees    QRS Count 14 beats    Q Onset 218 ms    P Onset 137 ms    P Offset 184 ms    T Offset 412 ms    QTC Fredericia 427 ms   CBC and Auto Differential   Result Value Ref Range    WBC 8.7 4.4 - 11.3 x10*3/uL    nRBC 0.0 0.0 - 0.0 /100 WBCs    RBC 4.06 4.00 - 5.20 x10*6/uL    Hemoglobin 12.5 12.0 - 16.0 g/dL    Hematocrit 35.8 (L) 36.0 - 46.0 %    MCV 88 80 - 100 fL    MCH 30.8 26.0 - 34.0 pg    MCHC 34.9 32.0 - 36.0 g/dL    RDW 12.4 11.5 - 14.5 %    Platelets 275 150 - 450 x10*3/uL    Neutrophils % 59.8 40.0 - 80.0 %    Immature Granulocytes %, Automated 0.3 0.0 - 0.9 %    Lymphocytes % 27.2 13.0 - 44.0 %    Monocytes % 11.4 2.0 - 10.0 %    Eosinophils % 0.7 0.0 - 6.0 %    Basophils % 0.6 0.0 - 2.0 %    Neutrophils Absolute 5.20 1.60 - 5.50 x10*3/uL    Immature Granulocytes Absolute, Automated 0.03 0.00 - 0.50 x10*3/uL    Lymphocytes Absolute 2.36 0.80 - 3.00  x10*3/uL    Monocytes Absolute 0.99 (H) 0.05 - 0.80 x10*3/uL    Eosinophils Absolute 0.06 0.00 - 0.40 x10*3/uL    Basophils Absolute 0.05 0.00 - 0.10 x10*3/uL   Magnesium   Result Value Ref Range    Magnesium 2.06 1.60 - 2.40 mg/dL   Comprehensive metabolic panel   Result Value Ref Range    Glucose 101 (H) 74 - 99 mg/dL    Sodium 129 (L) 136 - 145 mmol/L    Potassium 3.4 (L) 3.5 - 5.3 mmol/L    Chloride 95 (L) 98 - 107 mmol/L    Bicarbonate 25 21 - 32 mmol/L    Anion Gap 12 10 - 20 mmol/L    Urea Nitrogen 8 6 - 23 mg/dL    Creatinine 0.56 0.50 - 1.05 mg/dL    eGFR 88 >60 mL/min/1.73m*2    Calcium 8.9 8.6 - 10.3 mg/dL    Albumin 4.4 3.4 - 5.0 g/dL    Alkaline Phosphatase 95 33 - 136 U/L    Total Protein 6.8 6.4 - 8.2 g/dL    AST 18 9 - 39 U/L    Bilirubin, Total 0.6 0.0 - 1.2 mg/dL    ALT 12 7 - 45 U/L   BLOOD GAS LACTIC ACID, VENOUS   Result Value Ref Range    POCT Lactate, Venous 1.1 0.4 - 2.0 mmol/L   Alcohol   Result Value Ref Range    Alcohol <10 <=10 mg/dL   TSH with reflex to Free T4 if abnormal   Result Value Ref Range    Thyroid Stimulating Hormone 4.08 (H) 0.44 - 3.98 mIU/L   Thyroxine, Free   Result Value Ref Range    Thyroxine, Free 1.00 0.61 - 1.12 ng/dL   Folate   Result Value Ref Range    Folate, Serum 21.6 >5.0 ng/mL   Ammonia   Result Value Ref Range    Ammonia 21 16 - 53 umol/L   Basic metabolic panel   Result Value Ref Range    Glucose 111 (H) 74 - 99 mg/dL    Sodium 128 (L) 136 - 145 mmol/L    Potassium 3.5 3.5 - 5.3 mmol/L    Chloride 94 (L) 98 - 107 mmol/L    Bicarbonate 23 21 - 32 mmol/L    Anion Gap 15 10 - 20 mmol/L    Urea Nitrogen 7 6 - 23 mg/dL    Creatinine 0.49 (L) 0.50 - 1.05 mg/dL    eGFR >90 >60 mL/min/1.73m*2    Calcium 8.6 8.6 - 10.3 mg/dL   CBC   Result Value Ref Range    WBC 7.4 4.4 - 11.3 x10*3/uL    nRBC 0.0 0.0 - 0.0 /100 WBCs    RBC 3.91 (L) 4.00 - 5.20 x10*6/uL    Hemoglobin 12.0 12.0 - 16.0 g/dL    Hematocrit 34.7 (L) 36.0 - 46.0 %    MCV 89 80 - 100 fL    MCH 30.7 26.0 -  34.0 pg    MCHC 34.6 32.0 - 36.0 g/dL    RDW 12.5 11.5 - 14.5 %    Platelets 275 150 - 450 x10*3/uL   Gamma-Glutamyl Transferase   Result Value Ref Range    GGT 19 5 - 55 U/L   Cortisol AM   Result Value Ref Range    Cortisol  A.M. 15.7 5.0 - 20.0 ug/dL     *Note: Due to a large number of results and/or encounters for the requested time period, some results have not been displayed. A complete set of results can be found in Results Review.       SIGNATURE: Brandon Villalpando MD PATIENT NAME: Stephanie Jimenez   DATE: May 14, 2025 MRN: [id number]   TIME: 10:12 AM PAGER: [phone]            [1]   Past Medical History:  Diagnosis Date    Actinic keratosis of scalp     Anxiety state     Asthma     Dr. Chau    Cystocele, unspecified     Gastritis     Hemorrhoids     Hiatal hernia     Hypercholesterolemia     Hypertension     Osteoporosis     halo fracture right humerus    Prediabetes     Sciatica    [2]   Past Surgical History:  Procedure Laterality Date    BIOPSY  2013    lip, noncancerous    BIOPSY Left 03/2021    left leg - apex derm    BIOPSY  03/2021    forehead - apex derm    BLADDER SUSPENSION  1979    COLONOSCOPY  2002    COLONOSCOPY  11/2015    CYSTOSCOPY  2004    with suburethral sling    ESOPHAGOGASTRODUODENOSCOPY      HIP SURGERY Right 06/2019    right hip/femur    TOTAL ABDOMINAL HYSTERECTOMY W/ BILATERAL SALPINGOOPHORECTOMY  1977    URETHRAL SLING  2004    suburethral sling and cystoscopy   [3]   Family History  Problem Relation Name Age of Onset    Heart disease Mother      Lung cancer Father      Other (mouth cancer) Sister      Lung cancer Sister      Heart attack Sister      No Known Problems Sister      Lung cancer Brother      No Known Problems Daughter      No Known Problems Son     [4]   Social History  Tobacco Use    Smoking status: Former     Types: Cigarettes    Smokeless tobacco: Never   Vaping Use    Vaping status: Never Used   Substance Use Topics    Alcohol use: Never     Comment:  occasional    Drug use: Never   [5] amLODIPine, 10 mg, oral, Daily  atorvastatin, 10 mg, oral, Daily  famotidine, 20 mg, oral, BID   Or  famotidine, 20 mg, intravenous, BID  folic acid, 1 mg, oral, Daily  metoprolol succinate XL, 50 mg, oral, Daily  mirtazapine, 15 mg, oral, Nightly  multivitamin with minerals, 1 tablet, oral, Daily  polyethylene glycol, 17 g, oral, Daily  [START ON 5/17/2025] thiamine, 100 mg, oral, Daily  thiamine, 100 mg, intravenous, Daily  [6]    [7] PRN medications: acetaminophen **OR** acetaminophen **OR** acetaminophen, [Held by provider] ALPRAZolam, LORazepam **OR** LORazepam **OR** LORazepam, ondansetron ODT **OR** ondansetron

## 2025-05-14 NOTE — H&P
History Of Present Illness  Stephanie Jimenez is a 89 y.o. female presenting with altered mental status.    This is a 89-year-old female with past medical history of alcohol abuse, paroxysmal A-fib not on AC, diastolic heart failure, HLD, HTN, anxiety/depression, presented to the [facility] ED from assisted living with fatigue and malaise.  Reported 3 days of worsening confusion.  Patient is currently alert and oriented x 3 and seems pretty much with it.  But at the time of ED visit she had reported that she does not feel right.  Daughter was present at time of ED visit who mentioned she was called to  from assisted living due to patient being more confused than normal.  Facility had concerns of possible UTI.  There was denial of fall, fever, chills, chest pain, abdominal pain or back pain.  There was a note in the ED that daughter felt that mother may be drinking again possible alcohol withdrawal.  ED physician did not feel so at the time.  When asked the patient how much she drinks she says she drinks 3 to 4 glasses of whiskey 1-2 times per week.  She denies decreased oral intake.  She also endorsed a headache on ED admission which has pretty much resolved by now.    CT of the head performed nonacute with mild generalized atrophy and moderate chronic microvascular ischemia throughout the supratentorial white matter. Chest x-ray also showed chronic changes with no definite acute disease.  Viral respiratory panel negative.  Urinalysis appears noninfectious.   patient did take her Xanax tonight at the ED.    [facility] ED notable labs: Sodium 123, osmolality 260, chloride 90, TSH elevated, lactic acid 2.7, WBC 14.35    On admission, vital signs stable.    Admitted for altered mental status and hyponatremia       Past Medical History  Medical History[1]    Surgical History  Surgical History[2]     Social History  She reports that she has quit smoking. Her smoking use included cigarettes. She has never used  "smokeless tobacco. She reports that she does not drink alcohol and does not use drugs.    Family History  Family History[3]     Allergies  Patient has no known allergies.    Review of Systems   General: Fatigue and malaise, no fevers/chills   HENT: no rhinorrhea, no sore throat, no ear pain   Eyes: no change in vision, denies eye pain or discharge   Lungs: no SOB, no cough, no hemoptysis   CV: no chest pain, no palpitations, no leg edema   Abd: no nausea from a headache that resolved, but no vomiting, no constipation/diarrhea, no abdominal pain   : no dysuria, no frequency, no nocturia, no flank pain   Endocrine: no polydipsia/polyuria, no hot or cold intolerance   Neuro: no headaches, no syncope, no seizures   MSK: no back pain, no neck pain, no joint problems   Psych: no anxiety, no depression, no hallucinations    Physical Exam   General: alert and oriented, no diaphoresis   HENT: mucous membranes moist, external ears normal, no rhinorrhea   Eyes: no icterus or injection, no discharge   Lungs: Diminished but CTA BL   Heart: RRR, no murmurs, no LE edema BL   GI: abdomen soft, nontender, nondistended, BS present, suprapubic tenderness present   MSK: no joint effusion or deformity   Skin: no rashes, erythema, or ecchymosis   Neuro: grossly normal cognition, motor strength, sensation       Last Recorded Vitals  Blood pressure 140/61, pulse 82, temperature 37.2 °C (99 °F), temperature source Temporal, resp. rate 17, height 1.651 m (5' 5\"), weight 60.3 kg (133 lb), SpO2 98%.    Relevant Results  Results for orders placed or performed during the hospital encounter of 05/14/25 (from the past 24 hours)   Drug Screen, Urine With Reflex to Confirmation   Result Value Ref Range    Amphetamine Screen, Urine Presumptive Negative Presumptive Negative    Barbiturate Screen, Urine Presumptive Negative Presumptive Negative    Benzodiazepines Screen, Urine Presumptive Negative Presumptive Negative    Cannabinoid Screen, Urine " Presumptive Negative Presumptive Negative    Cocaine Metabolite Screen, Urine Presumptive Negative Presumptive Negative    Fentanyl Screen, Urine Presumptive Negative Presumptive Negative    Opiate Screen, Urine Presumptive Negative Presumptive Negative    Oxycodone Screen, Urine Presumptive Negative Presumptive Negative    PCP Screen, Urine Presumptive Negative Presumptive Negative    Methadone Screen, Urine Presumptive Negative Presumptive Negative   CBC and Auto Differential   Result Value Ref Range    WBC 8.7 4.4 - 11.3 x10*3/uL    nRBC 0.0 0.0 - 0.0 /100 WBCs    RBC 4.06 4.00 - 5.20 x10*6/uL    Hemoglobin 12.5 12.0 - 16.0 g/dL    Hematocrit 35.8 (L) 36.0 - 46.0 %    MCV 88 80 - 100 fL    MCH 30.8 26.0 - 34.0 pg    MCHC 34.9 32.0 - 36.0 g/dL    RDW 12.4 11.5 - 14.5 %    Platelets 275 150 - 450 x10*3/uL    Neutrophils % 59.8 40.0 - 80.0 %    Immature Granulocytes %, Automated 0.3 0.0 - 0.9 %    Lymphocytes % 27.2 13.0 - 44.0 %    Monocytes % 11.4 2.0 - 10.0 %    Eosinophils % 0.7 0.0 - 6.0 %    Basophils % 0.6 0.0 - 2.0 %    Neutrophils Absolute 5.20 1.60 - 5.50 x10*3/uL    Immature Granulocytes Absolute, Automated 0.03 0.00 - 0.50 x10*3/uL    Lymphocytes Absolute 2.36 0.80 - 3.00 x10*3/uL    Monocytes Absolute 0.99 (H) 0.05 - 0.80 x10*3/uL    Eosinophils Absolute 0.06 0.00 - 0.40 x10*3/uL    Basophils Absolute 0.05 0.00 - 0.10 x10*3/uL   Magnesium   Result Value Ref Range    Magnesium 2.06 1.60 - 2.40 mg/dL   Comprehensive metabolic panel   Result Value Ref Range    Glucose 101 (H) 74 - 99 mg/dL    Sodium 129 (L) 136 - 145 mmol/L    Potassium 3.4 (L) 3.5 - 5.3 mmol/L    Chloride 95 (L) 98 - 107 mmol/L    Bicarbonate 25 21 - 32 mmol/L    Anion Gap 12 10 - 20 mmol/L    Urea Nitrogen 8 6 - 23 mg/dL    Creatinine 0.56 0.50 - 1.05 mg/dL    eGFR 88 >60 mL/min/1.73m*2    Calcium 8.9 8.6 - 10.3 mg/dL    Albumin 4.4 3.4 - 5.0 g/dL    Alkaline Phosphatase 95 33 - 136 U/L    Total Protein 6.8 6.4 - 8.2 g/dL    AST 18 9 -  39 U/L    Bilirubin, Total 0.6 0.0 - 1.2 mg/dL    ALT 12 7 - 45 U/L   BLOOD GAS LACTIC ACID, VENOUS   Result Value Ref Range    POCT Lactate, Venous 1.1 0.4 - 2.0 mmol/L   Alcohol   Result Value Ref Range    Alcohol <10 <=10 mg/dL   TSH with reflex to Free T4 if abnormal   Result Value Ref Range    Thyroid Stimulating Hormone 4.08 (H) 0.44 - 3.98 mIU/L   Thyroxine, Free   Result Value Ref Range    Thyroxine, Free 1.00 0.61 - 1.12 ng/dL   Ammonia   Result Value Ref Range    Ammonia 21 16 - 53 umol/L   Basic metabolic panel   Result Value Ref Range    Glucose 111 (H) 74 - 99 mg/dL    Sodium 128 (L) 136 - 145 mmol/L    Potassium 3.5 3.5 - 5.3 mmol/L    Chloride 94 (L) 98 - 107 mmol/L    Bicarbonate 23 21 - 32 mmol/L    Anion Gap 15 10 - 20 mmol/L    Urea Nitrogen 7 6 - 23 mg/dL    Creatinine 0.49 (L) 0.50 - 1.05 mg/dL    eGFR >90 >60 mL/min/1.73m*2    Calcium 8.6 8.6 - 10.3 mg/dL   CBC   Result Value Ref Range    WBC 7.4 4.4 - 11.3 x10*3/uL    nRBC 0.0 0.0 - 0.0 /100 WBCs    RBC 3.91 (L) 4.00 - 5.20 x10*6/uL    Hemoglobin 12.0 12.0 - 16.0 g/dL    Hematocrit 34.7 (L) 36.0 - 46.0 %    MCV 89 80 - 100 fL    MCH 30.7 26.0 - 34.0 pg    MCHC 34.6 32.0 - 36.0 g/dL    RDW 12.5 11.5 - 14.5 %    Platelets 275 150 - 450 x10*3/uL         This is a 89-year-old female who was transferred from [facility] ED for 2 to 3 days of altered mental status, labs at that time were significant for hyponatremia.  Workup pending, nephrology consult  Assessment & Plan  Generalized weakness  From assisted living.  History of alcohol abuse but denies chronic use.  Possible symptomatic hyponatremia, she also does have slight unexplained leukocytosis at [facility] which resolved on admission labs here.  Chest x-ray and urinalysis noninfectious.  Workup pending  PT OT consulted  Fall precaution    AMS (altered mental status)  Acute metabolic encephalopathy  Patient was presented by [facility] as altered mental status and confusion, but presented completely alert and  oriented to person place and time, was able to give give me a concise history of her resolved confusion.  Suspect potential symptomatic hyponatremia which resolved with IV fluids.  Repeat labs pending.  [facility] abnormal labs: TSH elevated, Sodium 123, WBC 14.35, lactic acid 2.7  CT head Nonacute  Urinalysis noninfectious  Chest x-ray nonacute  TSH, folate, B12, and ammonia levels pending  Blood cultures pending    Hyponatremia  Initial sodium levels 123 with serum osmolality of 260, repeat admission labs improved to sodium 129  Serum osmolality, serum and urine sodium, a.m. cortisol pending  Resolving  Nephrology consulted    Alcohol use disorder  Complains of some anxiety  Endorses 3 to 4 glasses of whiskey 1-2 times a week  CIWA protocol initiated  Seizure precautions  Thiamine, folate    Elevated TSH  Subclinical hypothyroidism  Elevated TSH with normal free T4, likely subclinical hypothyroidism.  Denies history of thyroid disorder  Repeat TSH 4.08, Free T4 1.0    Paroxysmal atrial fibrillation (Multi)  EKG normal sinus rhythm, left axis deviation, LVH, 80 bpm, QTc 447  Not on anticoagulation  Continue metoprolol succinate 50 mg daily  Magnesium 2.06    HTN (hypertension), benign  Continue home amlodipine 10 mg daily    Hyperlipidemia  Continue Lipitor 10 mg daily      DVT prophylaxis: SCD    CODE STATUS: Full code    I spent 60 minutes in the professional and overall care of this patient.      Mic Quezada MD         [1]   Past Medical History:  Diagnosis Date    Actinic keratosis of scalp     Anxiety state     Asthma     Dr. Chau    Cystocele, unspecified     Gastritis     Hemorrhoids     Hiatal hernia     Hypercholesterolemia     Hypertension     Osteoporosis     halo fracture right humerus    Prediabetes     Sciatica    [2]   Past Surgical History:  Procedure Laterality Date    BIOPSY  2013    lip, noncancerous    BIOPSY Left 03/2021    left leg - apex derm    BIOPSY  03/2021    forehead - apex derm    BLADDER  SUSPENSION  1979    COLONOSCOPY  2002    COLONOSCOPY  11/2015    CYSTOSCOPY  2004    with suburethral sling    ESOPHAGOGASTRODUODENOSCOPY      HIP SURGERY Right 06/2019    right hip/femur    TOTAL ABDOMINAL HYSTERECTOMY W/ BILATERAL SALPINGOOPHORECTOMY  1977    URETHRAL SLING  2004    suburethral sling and cystoscopy   [3]   Family History  Problem Relation Name Age of Onset    Heart disease Mother      Lung cancer Father      Other (mouth cancer) Sister      Lung cancer Sister      Heart attack Sister      No Known Problems Sister      Lung cancer Brother      No Known Problems Daughter      No Known Problems Son

## 2025-05-14 NOTE — ASSESSMENT & PLAN NOTE
From assisted living.  History of alcohol abuse but denies chronic use.  Possible symptomatic hyponatremia, she also does have slight unexplained leukocytosis at [facility] which resolved on admission labs here.  Chest x-ray and urinalysis noninfectious.  Workup pending  PT OT consulted  Fall precaution

## 2025-05-14 NOTE — PROGRESS NOTES
"   05/14/25 1209   Discharge Planning   Living Arrangements Other (Comment)  (assisted living)   Support Systems Children;Family members   Assistance Needed Independent with ADLs and functional transfers  Receives Help From: Other (Comment) (care staff)  ADL Assistance: Independent  Homemaking Assistance:  (facility manages)  Ambulatory Assistance: Independent (uses a 2 wheeled walker long distances and in the community)   Pt reports independence with ADLs and not using an A.D. \"in the apartment.\"   Type of Residence Assisted living   Do you have animals or pets at home? No   Care Facility Name [facility]   Who is requesting discharge planning? Provider   Home or Post Acute Services Post acute facilities (Rehab/SNF/etc)   Type of Post Acute Facility Services Assisted living   Expected Discharge Disposition Home  ([facility])   Does the patient need discharge transport arranged? No   Financial Resource Strain   How hard is it for you to pay for the very basics like food, housing, medical care, and heating? Not hard   Housing Stability   In the last 12 months, was there a time when you were not able to pay the mortgage or rent on time? N   In the past 12 months, how many times have you moved where you were living? 0   At any time in the past 12 months, were you homeless or living in a shelter (including now)? N   Transportation Needs   In the past 12 months, has lack of transportation kept you from medical appointments or from getting medications? no   In the past 12 months, has lack of transportation kept you from meetings, work, or from getting things needed for daily living? No   Patient Choice   Provider Choice list and CMS website (https://medicare.gov/care-compare#search) for post-acute Quality and Resource Measure Data were provided and reviewed with: Patient   Patient / Family choosing to utilize agency / facility established prior to hospitalization Yes   Intensity of Service "   Intensity of Service 0-30 min     This is a 89-year-old female with past medical history of alcohol abuse, paroxysmal A-fib not on AC, diastolic heart failure, HLD, HTN, anxiety/depression, presented to the [facility] ED from assisted living with fatigue and malaise.  Reported 3 days of worsening confusion.     Per notes: she drinks 3 to 4 glasses of whiskey 1-2 times per week.   Patient lives at [facility], has a supportive daughter.   Home Adaptive Equipment: Walker rolling or standard, Cane   Independent with ADLs and functional transfers  Homemaking Assistance:  (facility manages)  Ambulatory Assistance: Independent (uses a 2 wheeled walker long distances and in the community)  Pt reports independence with ADLs.  If she has any therapy needs, [facility] has PT/OT.   Sodium level 128. Has had worsening headache.   She drinks at least 2 cups of coffee per day, about 2 L of water, a lot of soda, and occasionally 2 to 3 glasses of whiskey. She does not take diuretics,     PLAN: return back to [facility] with no needs

## 2025-05-14 NOTE — ASSESSMENT & PLAN NOTE
Initial sodium levels 123 with serum osmolality of 260, repeat admission labs improved to sodium 129  Serum osmolality, serum and urine sodium, a.m. cortisol pending  Resolving  Nephrology consulted

## 2025-05-14 NOTE — PROGRESS NOTES
05/14/25 1233   Physical Activity   On average, how many days per week do you engage in moderate to strenuous exercise (like a brisk walk)? 0 days   On average, how many minutes do you engage in exercise at this level? 0 min   Financial Resource Strain   How hard is it for you to pay for the very basics like food, housing, medical care, and heating? Not hard   Housing Stability   In the last 12 months, was there a time when you were not able to pay the mortgage or rent on time? N   In the past 12 months, how many times have you moved where you were living? 0   At any time in the past 12 months, were you homeless or living in a shelter (including now)? N   Transportation Needs   In the past 12 months, has lack of transportation kept you from medical appointments or from getting medications? no   In the past 12 months, has lack of transportation kept you from meetings, work, or from getting things needed for daily living? No   Food Insecurity   Within the past 12 months, you worried that your food would run out before you got the money to buy more. Never true   Within the past 12 months, the food you bought just didn't last and you didn't have money to get more. Never true   Stress   Do you feel stress - tense, restless, nervous, or anxious, or unable to sleep at night because your mind is troubled all the time - these days? To some exte   Social Connections   In a typical week, how many times do you talk on the phone with family, friends, or neighbors? Three   How often do you get together with friends or relatives? Three times   How often do you attend Congregational or Jew services? Never   Do you belong to any clubs or organizations such as Congregational groups, unions, fraternal or athletic groups, or school groups? No   How often do you attend meetings of the clubs or organizations you belong to? Never   Are you , , , , never , or living with a partner?    Intimate Partner  Violence   Within the last year, have you been afraid of your partner or ex-partner? No   Within the last year, have you been humiliated or emotionally abused in other ways by your partner or ex-partner? No   Within the last year, have you been kicked, hit, slapped, or otherwise physically hurt by your partner or ex-partner? No   Within the last year, have you been raped or forced to have any kind of sexual activity by your partner or ex-partner? No   Alcohol Use   Q1: How often do you have a drink containing alcohol? 4 or more ti   Q2: How many drinks containing alcohol do you have on a typical day when you are drinking? 1 or 2   Q3: How often do you have six or more drinks on one occasion? Never   Utilities   In the past 12 months has the electric, gas, oil, or water company threatened to shut off services in your home? No   Health Literacy   How often do you need to have someone help you when you read instructions, pamphlets, or other written material from your doctor or pharmacy? Never   Follow-Ups   We make community resources available to all of our patients to assist with everyday needs. We may be able to connect you with those resources. Would you be interested? N

## 2025-05-14 NOTE — TELEPHONE ENCOUNTER
Patient presented to [facility] ED 5/13/25 and was transferred to [facility] for admission on 5/14/25. Presented to [facility] ED from her [facility] with fatigue and malaise. Reported 3 days of worsening confusion. There was a note in the ED that daughter felt that mother may be drinking again possible alcohol withdrawal. ED physician did not feel so at the time. Head CT non acute with mild generalized atrophy and moderate chronic microvascular ischemia throughout the supratentorial white matter. CXR showed chronic changes with no definite acute disease. Viral respiratory panel negative. Urinalysis appears noninfectious. [facility] ED notable labs: Sodium 123, osmolality 260, chloride 90, TSH elevated, lactic acid 2.7, WBC 14.35. [facility] Call[facility] will monitor hospitalization and discharge plan.

## 2025-05-14 NOTE — ASSESSMENT & PLAN NOTE
Complains of some anxiety  Endorses 3 to 4 glasses of whiskey 1-2 times a week  WA protocol initiated  Seizure precautions  Thiamine, folate

## 2025-05-14 NOTE — ASSESSMENT & PLAN NOTE
Patient was presented by [facility] as altered mental status and confusion, but presented completely alert and oriented to person place and time, was able to give give me a concise history of her resolved confusion.  Suspect potential symptomatic hyponatremia which resolved with IV fluids.  Repeat labs pending.  [facility] abnormal labs: TSH elevated, Sodium 123, WBC 14.35, lactic acid 2.7  CT head Nonacute  Urinalysis noninfectious  Chest x-ray nonacute  TSH, folate, B12, and ammonia levels pending  Blood cultures pending

## 2025-05-14 NOTE — PROGRESS NOTES
Evaluation/Treatment    Patient Name: Stephanie Jimenez  MRN: [id number]  Department: [facility] 3 S  Room: [facility]315-A  Today's Date: 05/14/25  Time Calculation  Start Time: 0802  Stop Time: 0825  Time Calculation (min): 23 min       Assessment:  OT Assessment: 88 yo female admites from an [facility] with malaise, fatigue, and confusion.  On eval patient requires min assist with ADLs, transfers, and mobility d/t impaired cognition, decreased activity tolerance, and generalized weakness.  Pt would benefit from in-house OT services to provide ADL retraining utilizing compensatory techniques and progressive strengthening in order to increase functional capacity and safety with mobility.  Prognosis: Good  Barriers to Discharge Home: Cognition needs  Cognition Needs: Cognition-related high falls risk  Evaluation/Treatment Tolerance: Patient limited by fatigue  Medical Staff Made Aware: Yes  End of Session Communication: Bedside nurse  End of Session Patient Position: Up in chair, Alarm on  OT Assessment Results: Decreased ADL status, Decreased upper extremity range of motion, Decreased upper extremity strength, Decreased safe judgment during ADL, Decreased cognition, Decreased endurance, Decreased functional mobility, Decreased IADLs  Prognosis: Good  Evaluation/Treatment Tolerance: Patient limited by fatigue  Medical Staff Made Aware: Yes  Strengths: Attitude of self  Barriers to Participation: Comorbidities, Ability to acquire knowledge    Plan:  Treatment Interventions: ADL retraining, Functional transfer training, UE strengthening/ROM, Endurance training, Cognitive reorientation, Patient/family training, Equipment evaluation/education, Compensatory technique education  OT Frequency: 3 times per week  OT Discharge Recommendations: Low intensity level of continued care, 24 hr supervision due to cognition  Equipment Recommended upon Discharge: Wheeled walker  OT Recommended Transfer Status: Assist of 1  OT - OK to Discharge:  Yes  Treatment Interventions: ADL retraining, Functional transfer training, UE strengthening/ROM, Endurance training, Cognitive reorientation, Patient/family training, Equipment evaluation/education, Compensatory technique education      Subjective   Current Problem:  1. AMS (altered mental status)          OT Visit Info:  OT Received On: 05/14/25    General Visit Info:   General  Reason for Referral: Impaired ADLs r/t generalized weakness  Referred By: Dr Quezada  Past Medical History Relevant to Rehab: anxiety, depression, ETOH, a-fib, HTN, HLD, OP  Family/Caregiver Present: No  Prior to Session Communication: Bedside nurse  Patient Position Received: Bed, 3 rail up, Alarm on  Preferred Learning Style: verbal, visual  General Comment: Cleared by nursing and agreeable for therapy     Precautions:  Hearing/Visual Limitations: +corrective lenses for reading; hearing intact  Medical Precautions: Fall precautions     Date/Time Vitals Session Patient Position Pulse Resp SpO2 BP MAP (mmHg)    05/14/25 0747 --  --  79  16  98 %  129/66  87      Pain:  Pain Assessment  Pain Assessment: 0-10  0-10 (Numeric) Pain Score: 0 - No pain      Objective   Cognition:  Overall Cognitive Status: Impaired  Orientation Level: Disoriented to place, Disoriented to situation (knows month and year)  Following Commands: Follows one step commands without difficulty  Cognition Comments: impaired safety awareness; questionable historian    Home Living:  Type of Home: Assisted living  Lives With: Other (Comment) (Care Staff)  Home Adaptive Equipment: Walker rolling or standard, Cane  Home Layout: One level  Home Access: Level entry  Bathroom Shower/Tub: Walk-in shower  Bathroom Toilet: Adaptive toilet seating  Bathroom Equipment: Grab bars in shower, Built-in shower seat  Home Living Comments: Pt reports recently moving to [facility]    Prior Function:  Level of [place]: Independent with ADLs and functional transfers  Receives Help From:  "Other (Comment) (Care staff complete IADLs)  ADL Assistance: Independent  Ambulatory Assistance: Independent (uses a 2 wheeled walker long distances and in the community)  Vocational: Retired  Hand Dominance: Right  Prior Function Comments: Pt reports independence with ADLs and not using an A.D. \"in the apartment.\"    ADL:  Eating Assistance: Independent  Grooming Assistance: Stand by  Grooming Deficit: Setup, Supervision/safety (standing sinkside for oral care and combing hair)  Bathing Assistance: Minimal  Bathing Deficit: Steadying (anticipated)  UE Dressing Assistance: Stand by  UE Dressing Deficit: Setup (anticipated)  LE Dressing Assistance: Minimal  LE Dressing Deficit: Steadying (anticipated)  Toileting Assistance with Device: Minimal  Toileting Deficit: Steadying    Bed Mobility/Transfers: Bed Mobility  Bed Mobility: Yes  Bed Mobility 1  Bed Mobility 1: Supine to sitting  Level of Assistance 1: Close supervision  Bed Mobility Comments 1: toward the right side of bed with head elevated ~10 degrees    Transfers  Transfer: Yes  Transfer 1  Transfer From 1: Bed to  Transfer to 1: Stand  Technique 1: Sit to stand  Transfer Device 1: Walker  Transfer Level of Assistance 1: Close supervision  Trials/Comments 1:  (cues for hand placement)  Transfers 2  Transfer From 2: Toilet to  Transfer to 2: Stand  Technique 2: Sit to stand, Stand to sit  Transfer Device 2:  (grab bar, sink)  Transfer Level of Assistance 2: Minimum assistance  Trials/Comments 2: cues for hand placement  Transfers 3  Transfer From 3: Chair with arms to  Transfer to 3: Stand  Technique 3: Sit to stand, Stand to sit  Transfer Device 3: Walker  Transfer Level of Assistance 3: Close supervision  Trials/Comments 3: cues for hand placement    Functional Mobility:  Functional Mobility  Functional Mobility Performed:  (Contact guard assist with a 2 wheeled walker ~12'x4 with safety cues throughout.)    Sensation:  Light Touch: No apparent " deficits  Sensation Comment: Denies tingling/numbness    Strength:  Strength Comments: BUEs=~3+/5    Hand Function:  Hand Function  Gross Grasp: Functional  Coordination: Functional    Extremities: RUE   RUE : Exceptions to WFL (~90 degrees shoulder flexion d/t previous fractures per patient) and LUE   LUE: Within Functional Limits    Outcome Measures: [facility] Daily Activity  Putting on and taking off regular lower body clothing: A little  Bathing (including washing, rinsing, drying): A little  Putting on and taking off regular upper body clothing: A little  Toileting, which includes using toilet, bedpan or urinal: A little  Taking care of personal grooming such as brushing teeth: A little  Eating Meals: None  Daily Activity - Total Score: 19        Education Documentation  ADL Training, taught by Lucy Phillips OT at 5/14/2025  8:42 AM.  Learner: Patient  Readiness: Acceptance  Method: Explanation  Response: Demonstrated Understanding, Needs Reinforcement  Comment: ADL and functional transfer/mobility retraining initiated    OP EDUCATION:  Education  Individual(s) Educated: Patient  Education Provided: Fall precautons, Risk and benefits of OT discussed with patient or other, POC discussed and agreed upon  Risk and Benefits Discussed with Patient/Caregiver/Other: yes  Patient/Caregiver Demonstrated Understanding: yes  Plan of Care Discussed and Agreed Upon: yes  Patient Response to Education: Patient/Caregiver Verbalized Understanding of Information    Goals:  Encounter Problems       Encounter Problems (Active)       OT Goals       ADLs (Progressing)       Start:  05/14/25    Expected End:  05/21/25       Patient will complete ADL tasks, with modified independent using AE need in order to increase patient's safety and independence with self-care tasks.           Functional transfers (Progressing)       Start:  05/14/25    Expected End:  05/21/25       Patient will complete functional transfers with modified  independent using least restrictive device, in order to increase patient's safety and independence with daily tasks.           Activity tolerance (Progressing)       Start:  05/14/25    Expected End:  05/21/25       Patient will demonstrate the ability to participate in functional activity at least >/= 25 minutes in order to increase patient's safety and independence with daily tasks.

## 2025-05-14 NOTE — ASSESSMENT & PLAN NOTE
-PT, OT  -CT brain no acute findings  -no focal deficits    AMS  -resolved  -possibly secondary to hyponatremia  -BC pending  -urinalysis negative  -CXR no acute findings    Hyponatremia  -improving  -fluid restriction  -nephrology following      Alcohol Use  -CIWA  -MVI, folate, thiamine    A-Fib  -rate control  -not on anticoagulation  -continue BB     Hyperlipidemia  -continue statin    HTN  -monitor blood pressure    Disposition  Above plan discussed with pt and she is in agreement

## 2025-05-14 NOTE — PROGRESS NOTES
Spiritual Care Visit  Spiritual Care Request    Reason for Visit:  Routine Visit: Introduction     Request Received From:       Focus of Care:  Visited With: Patient         Refer to :          Spiritual Care Assessment    Spiritual Assessment:                      Care Provided:  Intended Effects: Build relationship of care and support, Convey a calming presence, Demonstrate caring and concern    Sense of Community and or Protestant Affiliation:  Uatsdin   Values/Beliefs  Spiritual Requests During Hospitalization: Stephanie asked to be anointd and to have Communion     Addressed Needs/Concerns and/or Alanis Through:     Sacramental Encounters  Communion: Patient wants communion  Communion Given Indicator: Yes  Sacrament of Sick-Anointing: Anointed    Outcome:        Advance Directives:         Spiritual Care Annotation    Annotation:  Stephanie asked to be anointed and to have Communion today.  Alfredo Lu

## 2025-05-14 NOTE — ASSESSMENT & PLAN NOTE
Elevated TSH with normal free T4, likely subclinical hypothyroidism.  Denies history of thyroid disorder  Repeat TSH 4.08, Free T4 1.0

## 2025-05-14 NOTE — PROGRESS NOTES
05/14/25 1233   [facility] Disability Status   Are you deaf or do you have serious difficulty hearing? N   Are you blind or do you have serious difficulty seeing, even when wearing glasses? N   Because of a physical, mental, or emotional condition, do you have serious difficulty concentrating, remembering, or making decisions? (5 years old or older) N   Do you have serious difficulty walking or climbing stairs? N   Do you have serious difficulty dressing or bathing? N   Because of a physical, mental, or emotional condition, do you have serious difficulty doing errands alone such as visiting the doctor? N

## 2025-05-14 NOTE — PROGRESS NOTES
"Stephanie Jimenez is a 89 y.o. female on day 0 of admission presenting with Generalized weakness.    Subjective   Pt seen and examined.        Objective     Physical Exam  Constitutional:       General: She is not in acute distress.     Appearance: She is not toxic-appearing.   Cardiovascular:      Rate and Rhythm: Normal rate and regular rhythm.      Pulses: Normal pulses.      Heart sounds: Normal heart sounds.   Pulmonary:      Effort: Pulmonary effort is normal. No respiratory distress.      Breath sounds: Normal breath sounds. No wheezing, rhonchi or rales.   Abdominal:      General: Bowel sounds are normal.      Palpations: Abdomen is soft.   Musculoskeletal:      Right lower leg: No edema.      Left lower leg: No edema.   Skin:     General: Skin is warm and dry.   Neurological:      General: No focal deficit present.      Mental Status: She is alert and oriented to person, place, and time.   Psychiatric:         Mood and Affect: Mood normal.         Behavior: Behavior normal.         Last Recorded Vitals  Blood pressure 129/66, pulse 79, temperature 36.6 °C (97.9 °F), temperature source Temporal, resp. rate 16, height 1.651 m (5' 5\"), weight 60.3 kg (133 lb), SpO2 98%.  Intake/Output last 3 Shifts:  No intake/output data recorded.    Relevant Results    Scheduled medications  Scheduled Medications[1]  Continuous medications  Continuous Medications[2]  PRN medications  PRN Medications[3]     following data reviewed    WBC- 7.4  Hgb-12.0  Hct-34.7  Platelets-275      Na-128  K+-3.5  Cl-94  Bicarb-23  BUN-7  creatinine-0.5                 CT brain  IMPRESSION:     No CT evidence of an acute intracranial process.     Moderate chronic microvascular ischemic change throughout the   supratentorial white matter.     Mild generalized atrophy.              Assessment & Plan  Generalized weakness  -PT, OT  -CT brain no acute findings  -no focal deficits    AMS  -resolved  -possibly secondary to hyponatremia  -BC " pending  -urinalysis negative  -CXR no acute findings    Hyponatremia  -improving  -fluid restriction  -nephrology following      Alcohol Use  -CIWA  -MVI, folate, thiamine    A-Fib  -rate control  -not on anticoagulation  -continue BB     Hyperlipidemia  -continue statin    HTN  -monitor blood pressure    Disposition  Above plan discussed with pt and she is in agreement        YUNIOR Goodwin-CNP         [1] amLODIPine, 10 mg, oral, Daily  atorvastatin, 10 mg, oral, Daily  famotidine, 20 mg, oral, BID   Or  famotidine, 20 mg, intravenous, BID  folic acid, 1 mg, oral, Daily  metoprolol succinate XL, 50 mg, oral, Daily  mirtazapine, 15 mg, oral, Nightly  multivitamin with minerals, 1 tablet, oral, Daily  polyethylene glycol, 17 g, oral, Daily  [START ON 5/17/2025] thiamine, 100 mg, oral, Daily  thiamine, 100 mg, intravenous, Daily  [2]    [3] PRN medications: acetaminophen **OR** acetaminophen **OR** acetaminophen, [Held by provider] ALPRAZolam, LORazepam **OR** LORazepam **OR** LORazepam, ondansetron ODT **OR** ondansetron

## 2025-05-15 LAB
ALBUMIN SERPL BCP-MCNC: 4.1 G/DL (ref 3.4–5)
ANION GAP SERPL CALCULATED.3IONS-SCNC: 13 MMOL/L (ref 10–20)
BUN SERPL-MCNC: 9 MG/DL (ref 6–23)
CALCIUM SERPL-MCNC: 8.9 MG/DL (ref 8.6–10.3)
CHLORIDE SERPL-SCNC: 92 MMOL/L (ref 98–107)
CO2 SERPL-SCNC: 25 MMOL/L (ref 21–32)
CREAT SERPL-MCNC: 0.67 MG/DL (ref 0.5–1.05)
EGFRCR SERPLBLD CKD-EPI 2021: 84 ML/MIN/1.73M*2
ERYTHROCYTE [DISTWIDTH] IN BLOOD BY AUTOMATED COUNT: 12.6 % (ref 11.5–14.5)
GLUCOSE SERPL-MCNC: 97 MG/DL (ref 74–99)
HCT VFR BLD AUTO: 36.9 % (ref 36–46)
HGB BLD-MCNC: 12.8 G/DL (ref 12–16)
MCH RBC QN AUTO: 31 PG (ref 26–34)
MCHC RBC AUTO-ENTMCNC: 34.7 G/DL (ref 32–36)
MCV RBC AUTO: 89 FL (ref 80–100)
NRBC BLD-RTO: 0 /100 WBCS (ref 0–0)
PHOSPHATE SERPL-MCNC: 4.3 MG/DL (ref 2.5–4.9)
PLATELET # BLD AUTO: 276 X10*3/UL (ref 150–450)
POTASSIUM SERPL-SCNC: 3.7 MMOL/L (ref 3.5–5.3)
RBC # BLD AUTO: 4.13 X10*6/UL (ref 4–5.2)
SODIUM SERPL-SCNC: 126 MMOL/L (ref 136–145)
WBC # BLD AUTO: 7.8 X10*3/UL (ref 4.4–11.3)

## 2025-05-15 PROCEDURE — 85027 COMPLETE CBC AUTOMATED: CPT | Performed by: STUDENT IN AN ORGANIZED HEALTH CARE EDUCATION/TRAINING PROGRAM

## 2025-05-15 PROCEDURE — 2500000004 HC RX 250 GENERAL PHARMACY W/ HCPCS (ALT 636 FOR OP/ED): Performed by: STUDENT IN AN ORGANIZED HEALTH CARE EDUCATION/TRAINING PROGRAM

## 2025-05-15 PROCEDURE — 2060000001 HC INTERMEDIATE ICU ROOM DAILY

## 2025-05-15 PROCEDURE — 2500000001 HC RX 250 WO HCPCS SELF ADMINISTERED DRUGS (ALT 637 FOR MEDICARE OP): Performed by: INTERNAL MEDICINE

## 2025-05-15 PROCEDURE — 99232 SBSQ HOSP IP/OBS MODERATE 35: CPT | Performed by: NURSE PRACTITIONER

## 2025-05-15 PROCEDURE — 36415 COLL VENOUS BLD VENIPUNCTURE: CPT | Performed by: STUDENT IN AN ORGANIZED HEALTH CARE EDUCATION/TRAINING PROGRAM

## 2025-05-15 PROCEDURE — 2500000001 HC RX 250 WO HCPCS SELF ADMINISTERED DRUGS (ALT 637 FOR MEDICARE OP): Performed by: STUDENT IN AN ORGANIZED HEALTH CARE EDUCATION/TRAINING PROGRAM

## 2025-05-15 PROCEDURE — 80069 RENAL FUNCTION PANEL: CPT | Performed by: INTERNAL MEDICINE

## 2025-05-15 PROCEDURE — 97110 THERAPEUTIC EXERCISES: CPT | Mod: GP

## 2025-05-15 PROCEDURE — 2500000001 HC RX 250 WO HCPCS SELF ADMINISTERED DRUGS (ALT 637 FOR MEDICARE OP): Performed by: NURSE PRACTITIONER

## 2025-05-15 PROCEDURE — 2500000005 HC RX 250 GENERAL PHARMACY W/O HCPCS: Performed by: NURSE PRACTITIONER

## 2025-05-15 PROCEDURE — 97116 GAIT TRAINING THERAPY: CPT | Mod: GP

## 2025-05-15 RX ORDER — METHOCARBAMOL 500 MG/1
500 TABLET, FILM COATED ORAL EVERY 8 HOURS SCHEDULED
Status: DISCONTINUED | OUTPATIENT
Start: 2025-05-15 | End: 2025-05-16 | Stop reason: HOSPADM

## 2025-05-15 RX ORDER — TALC
3 POWDER (GRAM) TOPICAL NIGHTLY PRN
Status: DISCONTINUED | OUTPATIENT
Start: 2025-05-15 | End: 2025-05-16 | Stop reason: HOSPADM

## 2025-05-15 RX ORDER — SODIUM CHLORIDE 1000 MG
1000 TABLET, SOLUBLE MISCELLANEOUS
Status: DISCONTINUED | OUTPATIENT
Start: 2025-05-15 | End: 2025-05-16 | Stop reason: HOSPADM

## 2025-05-15 RX ADMIN — SODIUM CHLORIDE 1 G: 1 TABLET ORAL at 16:28

## 2025-05-15 RX ADMIN — FAMOTIDINE 20 MG: 20 TABLET, FILM COATED ORAL at 08:46

## 2025-05-15 RX ADMIN — METOPROLOL SUCCINATE 50 MG: 50 TABLET, EXTENDED RELEASE ORAL at 08:46

## 2025-05-15 RX ADMIN — FAMOTIDINE 20 MG: 20 TABLET, FILM COATED ORAL at 20:02

## 2025-05-15 RX ADMIN — METHOCARBAMOL 500 MG: 500 TABLET ORAL at 16:28

## 2025-05-15 RX ADMIN — Medication 3 MG: at 21:24

## 2025-05-15 RX ADMIN — ACETAMINOPHEN 650 MG: 325 TABLET ORAL at 16:30

## 2025-05-15 RX ADMIN — AMLODIPINE BESYLATE 10 MG: 10 TABLET ORAL at 08:45

## 2025-05-15 RX ADMIN — THIAMINE HYDROCHLORIDE 100 MG: 100 INJECTION, SOLUTION INTRAMUSCULAR; INTRAVENOUS at 08:45

## 2025-05-15 RX ADMIN — FOLIC ACID 1 MG: 1 TABLET ORAL at 08:45

## 2025-05-15 RX ADMIN — SODIUM CHLORIDE 1 G: 1 TABLET ORAL at 12:40

## 2025-05-15 RX ADMIN — ACETAMINOPHEN 650 MG: 325 TABLET ORAL at 10:44

## 2025-05-15 RX ADMIN — METHOCARBAMOL 500 MG: 500 TABLET ORAL at 21:24

## 2025-05-15 RX ADMIN — ATORVASTATIN CALCIUM 10 MG: 10 TABLET, FILM COATED ORAL at 08:45

## 2025-05-15 RX ADMIN — Medication 1 TABLET: at 08:45

## 2025-05-15 RX ADMIN — MIRTAZAPINE 15 MG: 15 TABLET, FILM COATED ORAL at 20:02

## 2025-05-15 ASSESSMENT — COGNITIVE AND FUNCTIONAL STATUS - GENERAL
MOVING TO AND FROM BED TO CHAIR: A LITTLE
TURNING FROM BACK TO SIDE WHILE IN FLAT BAD: A LITTLE
MOBILITY SCORE: 22
CLIMB 3 TO 5 STEPS WITH RAILING: A LITTLE
MOBILITY SCORE: 22
WALKING IN HOSPITAL ROOM: A LITTLE
STANDING UP FROM CHAIR USING ARMS: A LITTLE
MOVING FROM LYING ON BACK TO SITTING ON SIDE OF FLAT BED WITH BEDRAILS: A LITTLE
CLIMB 3 TO 5 STEPS WITH RAILING: A LITTLE
CLIMB 3 TO 5 STEPS WITH RAILING: A LITTLE
WALKING IN HOSPITAL ROOM: A LITTLE
DAILY ACTIVITIY SCORE: 24
MOBILITY SCORE: 18
DAILY ACTIVITIY SCORE: 24
WALKING IN HOSPITAL ROOM: A LITTLE

## 2025-05-15 ASSESSMENT — PAIN SCALES - GENERAL
PAINLEVEL_OUTOF10: 5 - MODERATE PAIN
PAINLEVEL_OUTOF10: 0 - NO PAIN

## 2025-05-15 ASSESSMENT — PAIN - FUNCTIONAL ASSESSMENT: PAIN_FUNCTIONAL_ASSESSMENT: 0-10

## 2025-05-15 NOTE — PROGRESS NOTES
Physical Therapy    Physical Therapy Treatment    Patient Name: Stephanie Jimenez  MRN: [id number]  Department: [facility] 3 S  Room: [address] [place], [place] [address]A  Today's Date: 5/15/2025  Time Calculation  Start Time: 1342  Stop Time: 1408  Time Calculation (min): 26 min         Assessment/Plan   PT Assessment  PT Assessment Results: Decreased strength, Impaired balance, Decreased mobility, Decreased cognition, Impaired judgement, Decreased safety awareness  Rehab Prognosis: Good  Barriers to Discharge Home: No anticipated barriers  Evaluation/Treatment Tolerance: Patient tolerated treatment well  Medical Staff Made Aware: Yes  Strengths: Attitude of self  Barriers to Participation: Comorbidities  End of Session Communication:  (OT)  Assessment Comment: Pt moving well, motivated to move, decreased safety awareness, forgetful.  End of Session Patient Position: Bed, 3 rail up, Alarm on  PT Plan  Inpatient/Swing Bed or Outpatient: Inpatient  PT Plan  Treatment/Interventions: Bed mobility, Transfer training, Gait training, Balance training, Strengthening, Endurance training, Therapeutic exercise, Therapeutic activity  PT Plan: Ongoing PT  PT Frequency: 4 times per week  PT Discharge Recommendations: Low intensity level of continued care, 24 hr supervision due to cognition  Equipment Recommended upon Discharge: Wheeled walker  PT Recommended Transfer Status: Assist x1, Assistive device  PT - OK to Discharge: Yes    PT Visit Info:  PT Received On: 05/15/25     General Visit Information:   General  Reason for Referral: impaired mobility, generalied weakness  Referred By: Dr Quezada  Past Medical History Relevant to Rehab: anxiety, depression, ETOH, a-fib, HTN, HLD, asthma, pre-DM, osteoporosis  Family/Caregiver Present: No  Prior to Session Communication: PCT/NA/CTA  Patient Position Received: Bed, 3 rail up, Alarm on  Preferred Learning Style: verbal, visual  General Comment: Pt agreeable to PT session    Subjective    Precautions:  Precautions  Hearing/Visual Limitations: +corrective lenses for reading; hearing intact  Medical Precautions: Fall precautions          Objective   Pain:  Pain Assessment  Pain Assessment: 0-10  0-10 (Numeric) Pain Score: 5 - Moderate pain  Pain Type: Acute pain  Pain Location: Head  Pain Interventions:  (recent pain meds)  Response to Interventions: Decrease in pain (per pt report since taking med)  Cognition:  Cognition  Overall Cognitive Status: Impaired  Orientation Level: Disoriented to place, Disoriented to situation  Cognition Comments: pleasant, cooperative, forgetful    Postural Control:  Postural Control  Posture Comment: forward flexed  Dynamic Standing Balance  Dynamic Standing-Balance Support: Bilateral upper extremity supported  Dynamic Standing-Level of Assistance: Close supervision, Contact guard  Dynamic Standing-Balance: Turning (amb)  Dynamic Standing-Comments: Fair    Activity Tolerance:  Activity Tolerance  Endurance: Tolerates 10 - 20 min exercise with multiple rests (a little fatigued this PM)  Activity Tolerance Comments: good (mild fatigue this PM)  Treatments:  Therapeutic Exercise  Therapeutic Exercise Performed: Yes  Therapeutic Exercise Activity 1: pt performed seated bilat ankle pumps, LAQs, hip flexion x 15 reps, standing hip abd and hip extension x 10 reps w/ seated rest break in between due to reported fatigue. Also performed sit <> stand x 3 reps, required UE support,  noting pt slow to rise w/ increased effort required,  increased fatigue after 2nd rep w/ brief rest break required and third attempt was for back to bed.    Bed Mobility 1  Bed Mobility 1: Supine to sitting, Sitting to supine  Level of Assistance 1: Close supervision  Bed Mobility Comments 1: in/out Rt side of bed w/ HOB elevated slightly. Pt sat EOB to don bilat tennis shoes w/ min assist  or <.    Ambulation/Gait Training  Ambulation/Gait Training Performed: Yes  Ambulation/Gait Training 1  Surface  1: Level tile  Device 1: Rolling walker  Assistance 1: Contact guard, Close supervision  Comments/Distance (ft) 1: Pt amb ~ 100' x 2, seated rest break in between, fair pace, forward flexed posture, no LOB.  Transfers  Transfer: Yes  Transfer 1  Technique 1: Sit to stand, Stand to sit  Transfer Device 1: Walker  Transfer Level of Assistance 1: Close supervision, Contact guard  Trials/Comments 1: from/to EOB, to/from chair w/ arms x 2; cues to keep RW in use until ready to sit and for safe hand placement.    Outcome Measures:  [facility] Basic Mobility  Turning from your back to your side while in a flat bed without using bedrails: A little  Moving from lying on your back to sitting on the side of a flat bed without using bedrails: A little  Moving to and from bed to chair (including a wheelchair): A little  Standing up from a chair using your arms (e.g. wheelchair or bedside chair): A little  To walk in hospital room: A little  Climbing 3-5 steps with railing: A little  Basic Mobility - Total Score: 18    Education Documentation  Precautions, taught by Lala Palma PT at 5/15/2025  3:23 PM.  Learner: Patient  Readiness: Acceptance  Method: Explanation, Demonstration  Response: Needs Reinforcement  Comment: safety w/ mobility    Home Exercise Program, taught by Lala Palma PT at 5/15/2025  3:23 PM.  Learner: Patient  Readiness: Acceptance  Method: Explanation, Demonstration  Response: Needs Reinforcement  Comment: safety w/ mobility    Mobility Training, taught by Lala Palma PT at 5/15/2025  3:23 PM.  Learner: Patient  Readiness: Acceptance  Method: Explanation, Demonstration  Response: Needs Reinforcement  Comment: safety w/ mobility    Education Comments  No comments found.        OP EDUCATION:       Encounter Problems       Encounter Problems (Active)       Mobility       STG - Patient will ambulate 300' w/ RW and distant supervision  (Progressing)       Start:  05/14/25    Expected End:  05/23/25             Pt will consistently tolerate BLE exercises and/or therapeutic activities to promote functional strength, balance, endurance and safe mobility (Progressing)       Start:  05/14/25    Expected End:  05/23/25               PT Transfers       STG - Patient to transfer to and from sit to supine MOD I (Progressing)       Start:  05/14/25    Expected End:  05/23/25            STG - Patient will transfer sit to and from stand MOD I w/ safe technique (Progressing)       Start:  05/14/25    Expected End:  05/23/25

## 2025-05-15 NOTE — PROGRESS NOTES
Spiritual Care Visit  Spiritual Care Request    Reason for Visit:  Routine Visit: Follow-up     Request Received From:       Focus of Care:  Visited With: Patient         Refer to :          Spiritual Care Assessment    Spiritual Assessment:                      Care Provided:       Sense of Community and or Jewish Affiliation:  Confucianism   Values/Beliefs  Spiritual Requests During Hospitalization: Stephanie asked for Communion today.     Addressed Needs/Concerns and/or Alanis Through:     Sacramental Encounters  Communion: Patient wants communion  Communion Given Indicator: Yes    Outcome:        Advance Directives:         Spiritual Care Annotation    Annotation:  Stephanie asked for Communion today.  Alfredo Lu

## 2025-05-15 NOTE — PROGRESS NOTES
05/15/25 1234   Discharge Planning   Living Arrangements Other (Comment)   Support Systems Children;Family members   Type of Residence Assisted living   Care Facility Name [facility]   Home or Post Acute Services Post acute facilities (Rehab/SNF/etc)   Type of Post Acute Facility Services Assisted living   Expected Discharge Disposition [facility]  ([facility]     Per Notes:  Hyponatremia is worsening, 126, continues on fluid restriction and nephrology following.  Once stable, will go back to the assisted living.     PLAN: Return back to [facility] assisted living.

## 2025-05-15 NOTE — ASSESSMENT & PLAN NOTE
-PT, OT  -CT brain no acute findings  -no focal deficits    AMS  -possibly secondary to hyponatremia  -BC pending  -urinalysis negative  -CXR no acute findings    Hyponatremia  -worsening, 126  -fluid restriction  -nephrology following      Alcohol Use  -CIWA  -MVI, folate, thiamine    A-Fib  -rate control  -not on anticoagulation  -continue BB     Hyperlipidemia  -continue statin    HTN  -monitor blood pressure    Disposition  Above plan discussed with pt and she is in agreement

## 2025-05-15 NOTE — PROGRESS NOTES
"Occupational Therapy                 Therapy Communication Note    Patient Name: Stephanie Jimenez  MRN: [id number]  Department: [facility] 3 S  Room: [facility]  Today's Date: 5/15/2025     Discipline: Occupational Therapy    Missed Visit: OT Missed Visit: Yes     Missed Visit Reason: Missed Visit Reason: Other (Comment) (PM attempt x2; on first attempt pt working with PT,  on second attempt patient back in bed, reporting \"being too tired\" to get back OOB now.   Declined bed level ther ex, as well.  OT treatment deferred this date)    Missed Time: Cancel    Comment: 5/15 cx/refusal in PM    "

## 2025-05-15 NOTE — PROGRESS NOTES
"Stephanie Jimenez is a 89 y.o. female on day 1 of admission presenting with Generalized weakness.    Subjective   Pt seen and examined.  Pt sitting up in the chair she is slightly confused today and tells me she is at the [employer] and does not remember why she checked into the hotel.         Objective     Physical Exam  Constitutional:       General: She is not in acute distress.     Appearance: She is not toxic-appearing.   Cardiovascular:      Rate and Rhythm: Normal rate and regular rhythm.      Pulses: Normal pulses.      Heart sounds: Normal heart sounds.   Pulmonary:      Effort: Pulmonary effort is normal. No respiratory distress.      Breath sounds: Normal breath sounds. No wheezing, rhonchi or rales.   Abdominal:      General: Bowel sounds are normal.      Palpations: Abdomen is soft.   Musculoskeletal:      Right lower leg: No edema.      Left lower leg: No edema.   Skin:     General: Skin is warm and dry.   Neurological:      General: No focal deficit present.      Mental Status: She is alert. She is disoriented.   Psychiatric:         Mood and Affect: Mood normal.         Behavior: Behavior normal.         Last Recorded Vitals  Blood pressure 127/68, pulse 75, temperature 36.4 °C (97.5 °F), temperature source Temporal, resp. rate 16, height 1.651 m (5' 5\"), weight 60.3 kg (133 lb), SpO2 99%.  Intake/Output last 3 Shifts:  I/O last 3 completed shifts:  In: 480 (8 mL/kg) [P.O.:480]  Out: - (0 mL/kg)   Weight: 60.3 kg     Relevant Results    Scheduled medications  Scheduled Medications[1]  Continuous medications  Continuous Medications[2]  PRN medications  PRN Medications[3]           following data reviewed    WBC- 7.8  Hgb-12.8  Hct-36.9  Platelets-276    Na-126  K+-3.7  Cl-92  Bicarb-25  BUN-9  creatinine-0.7                      Assessment & Plan  Generalized weakness  -PT, OT  -CT brain no acute findings  -no focal deficits    AMS  -possibly secondary to hyponatremia  -BC pending  -urinalysis " negative  -CXR no acute findings    Hyponatremia  -worsening, 126  -fluid restriction  -nephrology following      Alcohol Use  -CIWA  -MVI, folate, thiamine    A-Fib  -rate control  -not on anticoagulation  -continue BB     Hyperlipidemia  -continue statin    HTN  -monitor blood pressure    Disposition  Above plan discussed with pt and she is in agreement        YUNIOR Goodwin-CNP         [1] amLODIPine, 10 mg, oral, Daily  atorvastatin, 10 mg, oral, Daily  famotidine, 20 mg, oral, BID   Or  famotidine, 20 mg, intravenous, BID  folic acid, 1 mg, oral, Daily  metoprolol succinate XL, 50 mg, oral, Daily  mirtazapine, 15 mg, oral, Nightly  multivitamin with minerals, 1 tablet, oral, Daily  polyethylene glycol, 17 g, oral, Daily  [START ON 5/17/2025] thiamine, 100 mg, oral, Daily  thiamine, 100 mg, intravenous, Daily  [2]    [3] PRN medications: acetaminophen **OR** acetaminophen **OR** acetaminophen, [Held by provider] ALPRAZolam, LORazepam **OR** LORazepam **OR** LORazepam, ondansetron ODT **OR** ondansetron

## 2025-05-15 NOTE — PROGRESS NOTES
CONSULT PROGRESS NOTES    SERVICE DATE: 5/15/2025   SERVICE TIME: 11:36 AM    CONSULTING SERVICE: Nephrology    ASSESSMENT AND PLAN   89-year-old woman with asthma and hypertension coming in with alteration mental status associated with hyponatremia.  1.  Hyponatremia  2.  Essential hypertension     She has intact serum creatinine, no major electrolyte disturbances save the hyponatremia.  This appears to be an acute on chronic picture.  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  She is euvolemic, no other obvious etiologies of hyponatremia.  I do not see any medications commonly implicated in hyponatremia, save the mirtazapine possibly.  Given the worsening sodium, begin salt tablets and tighten the fluid restriction to 1.2 L in 24 hours.  She was not excited about the prospect of urea powder packets.  Thyroid balance looks reasonably appropriate.  I doubt this is adrenal insufficiency.  Trend daily labs, continue supportive care.    Really not sure why her alteration mental status is worse today.  It would be bizarre for this to be from sodium, considering that it is fairly stable from yesterday and it really is not that far off from her outpatient readings.  Case discussed with Lala Liu CNP.  I will continue to follow this patient.    SUBJECTIVE  INTERVAL HPI: She is a little more confused today.  She denies any nausea, diarrhea.  She has been drinking a decent amount of fluids, she thinks it is more than her fluid restriction.  She has decent urine output.  There is no fever.  Her blood pressure has been stable.    MEDICATIONS:  Scheduled Medications[1]   Continuous Medications[2]   PRN Medications[3]     OBJECTIVE  PHYSICAL EXAM:   Heart Rate:  [75-86]   Temp:  [36.4 °C (97.5 °F)-37.4 °C (99.3 °F)]   Resp:  [16]   BP: (127-150)/(68-76)   SpO2:  [97 %-99 %]   Body mass index is 22.13 kg/m².  This is an elderly white woman who appears in no acute distress  Somewhat pale skin  Hearing intact  Phonation intact  Moist  mucosa  Normal S1/normal S2  Lungs are clear to auscultation bilaterally  Abdomen is soft, nondistended, nontender, positive bowel sounds  No Sanchez catheter in place, no suprapubic tenderness to palpation  No extremity edema  Moves 4 limbs spontaneously  No obvious joint deformities  No lymphadenopathy    DATA:   Labs:  Results for orders placed or performed during the hospital encounter of 05/14/25 (from the past 96 hours)   Drug Screen, Urine With Reflex to Confirmation   Result Value Ref Range    Amphetamine Screen, Urine Presumptive Negative Presumptive Negative    Barbiturate Screen, Urine Presumptive Negative Presumptive Negative    Benzodiazepines Screen, Urine Presumptive Negative Presumptive Negative    Cannabinoid Screen, Urine Presumptive Negative Presumptive Negative    Cocaine Metabolite Screen, Urine Presumptive Negative Presumptive Negative    Fentanyl Screen, Urine Presumptive Negative Presumptive Negative    Opiate Screen, Urine Presumptive Negative Presumptive Negative    Oxycodone Screen, Urine Presumptive Negative Presumptive Negative    PCP Screen, Urine Presumptive Negative Presumptive Negative    Methadone Screen, Urine Presumptive Negative Presumptive Negative   Sodium, Urine Random   Result Value Ref Range    Sodium, Urine Random 50 mmol/L    Creatinine, Urine Random 17.1 (L) 20.0 - 320.0 mg/dL    Sodium/Creatinine Ratio 292 Not established. mmol/g Creat   Osmolality, urine   Result Value Ref Range    Osmolality, Urine Random 186 (L) 200 - 1,200 mOsm/kg   ECG 12 lead   Result Value Ref Range    Ventricular Rate 80 BPM    Atrial Rate 80 BPM    CO Interval 162 ms    QRS Duration 90 ms    QT Interval 388 ms    QTC Calculation(Bazett) 447 ms    P Axis 16 degrees    R Axis -42 degrees    T Axis 48 degrees    QRS Count 14 beats    Q Onset 218 ms    P Onset 137 ms    P Offset 184 ms    T Offset 412 ms    QTC Fredericia 427 ms   CBC and Auto Differential   Result Value Ref Range    WBC 8.7 4.4 -  11.3 x10*3/uL    nRBC 0.0 0.0 - 0.0 /100 WBCs    RBC 4.06 4.00 - 5.20 x10*6/uL    Hemoglobin 12.5 12.0 - 16.0 g/dL    Hematocrit 35.8 (L) 36.0 - 46.0 %    MCV 88 80 - 100 fL    MCH 30.8 26.0 - 34.0 pg    MCHC 34.9 32.0 - 36.0 g/dL    RDW 12.4 11.5 - 14.5 %    Platelets 275 150 - 450 x10*3/uL    Neutrophils % 59.8 40.0 - 80.0 %    Immature Granulocytes %, Automated 0.3 0.0 - 0.9 %    Lymphocytes % 27.2 13.0 - 44.0 %    Monocytes % 11.4 2.0 - 10.0 %    Eosinophils % 0.7 0.0 - 6.0 %    Basophils % 0.6 0.0 - 2.0 %    Neutrophils Absolute 5.20 1.60 - 5.50 x10*3/uL    Immature Granulocytes Absolute, Automated 0.03 0.00 - 0.50 x10*3/uL    Lymphocytes Absolute 2.36 0.80 - 3.00 x10*3/uL    Monocytes Absolute 0.99 (H) 0.05 - 0.80 x10*3/uL    Eosinophils Absolute 0.06 0.00 - 0.40 x10*3/uL    Basophils Absolute 0.05 0.00 - 0.10 x10*3/uL   Blood Culture    Specimen: Peripheral Venipuncture; Blood culture   Result Value Ref Range    Blood Culture No growth at 1 day    Blood Culture    Specimen: Peripheral Venipuncture; Blood culture   Result Value Ref Range    Blood Culture Loaded on Instrument - Culture in progress    Magnesium   Result Value Ref Range    Magnesium 2.06 1.60 - 2.40 mg/dL   Comprehensive metabolic panel   Result Value Ref Range    Glucose 101 (H) 74 - 99 mg/dL    Sodium 129 (L) 136 - 145 mmol/L    Potassium 3.4 (L) 3.5 - 5.3 mmol/L    Chloride 95 (L) 98 - 107 mmol/L    Bicarbonate 25 21 - 32 mmol/L    Anion Gap 12 10 - 20 mmol/L    Urea Nitrogen 8 6 - 23 mg/dL    Creatinine 0.56 0.50 - 1.05 mg/dL    eGFR 88 >60 mL/min/1.73m*2    Calcium 8.9 8.6 - 10.3 mg/dL    Albumin 4.4 3.4 - 5.0 g/dL    Alkaline Phosphatase 95 33 - 136 U/L    Total Protein 6.8 6.4 - 8.2 g/dL    AST 18 9 - 39 U/L    Bilirubin, Total 0.6 0.0 - 1.2 mg/dL    ALT 12 7 - 45 U/L   BLOOD GAS LACTIC ACID, VENOUS   Result Value Ref Range    POCT Lactate, Venous 1.1 0.4 - 2.0 mmol/L   Alcohol   Result Value Ref Range    Alcohol <10 <=10 mg/dL    Osmolality   Result Value Ref Range    Osmolality, Serum 268 (L) 280 - 300 mOsm/kg   TSH with reflex to Free T4 if abnormal   Result Value Ref Range    Thyroid Stimulating Hormone 4.08 (H) 0.44 - 3.98 mIU/L   Thyroxine, Free   Result Value Ref Range    Thyroxine, Free 1.00 0.61 - 1.12 ng/dL   Folate   Result Value Ref Range    Folate, Serum 21.6 >5.0 ng/mL   Ammonia   Result Value Ref Range    Ammonia 21 16 - 53 umol/L   Basic metabolic panel   Result Value Ref Range    Glucose 111 (H) 74 - 99 mg/dL    Sodium 128 (L) 136 - 145 mmol/L    Potassium 3.5 3.5 - 5.3 mmol/L    Chloride 94 (L) 98 - 107 mmol/L    Bicarbonate 23 21 - 32 mmol/L    Anion Gap 15 10 - 20 mmol/L    Urea Nitrogen 7 6 - 23 mg/dL    Creatinine 0.49 (L) 0.50 - 1.05 mg/dL    eGFR >90 >60 mL/min/1.73m*2    Calcium 8.6 8.6 - 10.3 mg/dL   CBC   Result Value Ref Range    WBC 7.4 4.4 - 11.3 x10*3/uL    nRBC 0.0 0.0 - 0.0 /100 WBCs    RBC 3.91 (L) 4.00 - 5.20 x10*6/uL    Hemoglobin 12.0 12.0 - 16.0 g/dL    Hematocrit 34.7 (L) 36.0 - 46.0 %    MCV 89 80 - 100 fL    MCH 30.7 26.0 - 34.0 pg    MCHC 34.6 32.0 - 36.0 g/dL    RDW 12.5 11.5 - 14.5 %    Platelets 275 150 - 450 x10*3/uL   Gamma-Glutamyl Transferase   Result Value Ref Range    GGT 19 5 - 55 U/L   Cortisol AM   Result Value Ref Range    Cortisol  A.M. 15.7 5.0 - 20.0 ug/dL   CBC   Result Value Ref Range    WBC 7.8 4.4 - 11.3 x10*3/uL    nRBC 0.0 0.0 - 0.0 /100 WBCs    RBC 4.13 4.00 - 5.20 x10*6/uL    Hemoglobin 12.8 12.0 - 16.0 g/dL    Hematocrit 36.9 36.0 - 46.0 %    MCV 89 80 - 100 fL    MCH 31.0 26.0 - 34.0 pg    MCHC 34.7 32.0 - 36.0 g/dL    RDW 12.6 11.5 - 14.5 %    Platelets 276 150 - 450 x10*3/uL   Renal Function Panel   Result Value Ref Range    Glucose 97 74 - 99 mg/dL    Sodium 126 (L) 136 - 145 mmol/L    Potassium 3.7 3.5 - 5.3 mmol/L    Chloride 92 (L) 98 - 107 mmol/L    Bicarbonate 25 21 - 32 mmol/L    Anion Gap 13 10 - 20 mmol/L    Urea Nitrogen 9 6 - 23 mg/dL    Creatinine 0.67 0.50 -  1.05 mg/dL    eGFR 84 >60 mL/min/1.73m*2    Calcium 8.9 8.6 - 10.3 mg/dL    Phosphorus 4.3 2.5 - 4.9 mg/dL    Albumin 4.1 3.4 - 5.0 g/dL     *Note: Due to a large number of results and/or encounters for the requested time period, some results have not been displayed. A complete set of results can be found in Results Review.         SIGNATURE: Brandon Villalpando MD PATIENT NAME: Stephanie Jimenez   DATE: May 15, 2025 MRN: [id number]   TIME: 11:36 AM PAGER: [phone]            [1] amLODIPine, 10 mg, oral, Daily  atorvastatin, 10 mg, oral, Daily  famotidine, 20 mg, oral, BID   Or  famotidine, 20 mg, intravenous, BID  folic acid, 1 mg, oral, Daily  metoprolol succinate XL, 50 mg, oral, Daily  mirtazapine, 15 mg, oral, Nightly  multivitamin with minerals, 1 tablet, oral, Daily  polyethylene glycol, 17 g, oral, Daily  sodium chloride, 1,000 mg, oral, BID  [START ON 5/17/2025] thiamine, 100 mg, oral, Daily  thiamine, 100 mg, intravenous, Daily  [2]    [3] PRN medications: acetaminophen **OR** acetaminophen **OR** acetaminophen, [Held by provider] ALPRAZolam, LORazepam **OR** LORazepam **OR** LORazepam, ondansetron ODT **OR** ondansetron

## 2025-05-16 ENCOUNTER — PHARMACY VISIT (OUTPATIENT)
Dept: PHARMACY | Facility: CLINIC | Age: 89
End: 2025-05-16
Payer: COMMERCIAL

## 2025-05-16 VITALS
OXYGEN SATURATION: 97 % | WEIGHT: 133 LBS | RESPIRATION RATE: 16 BRPM | HEIGHT: 65 IN | DIASTOLIC BLOOD PRESSURE: 59 MMHG | SYSTOLIC BLOOD PRESSURE: 127 MMHG | BODY MASS INDEX: 22.16 KG/M2 | TEMPERATURE: 98.8 F | HEART RATE: 81 BPM

## 2025-05-16 LAB
ALBUMIN SERPL BCP-MCNC: 4 G/DL (ref 3.4–5)
ANION GAP SERPL CALCULATED.3IONS-SCNC: 15 MMOL/L (ref 10–20)
BUN SERPL-MCNC: 12 MG/DL (ref 6–23)
CALCIUM SERPL-MCNC: 8.7 MG/DL (ref 8.6–10.3)
CHLORIDE SERPL-SCNC: 94 MMOL/L (ref 98–107)
CO2 SERPL-SCNC: 23 MMOL/L (ref 21–32)
CREAT SERPL-MCNC: 0.71 MG/DL (ref 0.5–1.05)
EGFRCR SERPLBLD CKD-EPI 2021: 82 ML/MIN/1.73M*2
ERYTHROCYTE [DISTWIDTH] IN BLOOD BY AUTOMATED COUNT: 12.4 % (ref 11.5–14.5)
GLUCOSE SERPL-MCNC: 110 MG/DL (ref 74–99)
HCT VFR BLD AUTO: 36.4 % (ref 36–46)
HGB BLD-MCNC: 12.4 G/DL (ref 12–16)
MCH RBC QN AUTO: 30.3 PG (ref 26–34)
MCHC RBC AUTO-ENTMCNC: 34.1 G/DL (ref 32–36)
MCV RBC AUTO: 89 FL (ref 80–100)
NRBC BLD-RTO: 0 /100 WBCS (ref 0–0)
PHOSPHATE SERPL-MCNC: 4.3 MG/DL (ref 2.5–4.9)
PLATELET # BLD AUTO: 288 X10*3/UL (ref 150–450)
POTASSIUM SERPL-SCNC: 3.7 MMOL/L (ref 3.5–5.3)
RBC # BLD AUTO: 4.09 X10*6/UL (ref 4–5.2)
SODIUM SERPL-SCNC: 128 MMOL/L (ref 136–145)
WBC # BLD AUTO: 8 X10*3/UL (ref 4.4–11.3)

## 2025-05-16 PROCEDURE — 99239 HOSP IP/OBS DSCHRG MGMT >30: CPT | Performed by: NURSE PRACTITIONER

## 2025-05-16 PROCEDURE — 36415 COLL VENOUS BLD VENIPUNCTURE: CPT | Performed by: STUDENT IN AN ORGANIZED HEALTH CARE EDUCATION/TRAINING PROGRAM

## 2025-05-16 PROCEDURE — 2500000004 HC RX 250 GENERAL PHARMACY W/ HCPCS (ALT 636 FOR OP/ED): Performed by: STUDENT IN AN ORGANIZED HEALTH CARE EDUCATION/TRAINING PROGRAM

## 2025-05-16 PROCEDURE — 2500000001 HC RX 250 WO HCPCS SELF ADMINISTERED DRUGS (ALT 637 FOR MEDICARE OP): Performed by: NURSE PRACTITIONER

## 2025-05-16 PROCEDURE — 2500000001 HC RX 250 WO HCPCS SELF ADMINISTERED DRUGS (ALT 637 FOR MEDICARE OP): Performed by: INTERNAL MEDICINE

## 2025-05-16 PROCEDURE — 80069 RENAL FUNCTION PANEL: CPT | Performed by: INTERNAL MEDICINE

## 2025-05-16 PROCEDURE — 85027 COMPLETE CBC AUTOMATED: CPT | Performed by: STUDENT IN AN ORGANIZED HEALTH CARE EDUCATION/TRAINING PROGRAM

## 2025-05-16 PROCEDURE — RXMED WILLOW AMBULATORY MEDICATION CHARGE

## 2025-05-16 PROCEDURE — 2500000001 HC RX 250 WO HCPCS SELF ADMINISTERED DRUGS (ALT 637 FOR MEDICARE OP): Performed by: STUDENT IN AN ORGANIZED HEALTH CARE EDUCATION/TRAINING PROGRAM

## 2025-05-16 RX ORDER — SODIUM CHLORIDE 1000 MG
1000 TABLET, SOLUBLE MISCELLANEOUS
Qty: 60 TABLET | Refills: 0 | Status: SHIPPED | OUTPATIENT
Start: 2025-05-16 | End: 2025-05-27 | Stop reason: SDUPTHER

## 2025-05-16 RX ORDER — FOLIC ACID 1 MG/1
1 TABLET ORAL DAILY
Qty: 30 TABLET | Refills: 11 | Status: SHIPPED | OUTPATIENT
Start: 2025-05-17 | End: 2025-05-27 | Stop reason: SDUPTHER

## 2025-05-16 RX ORDER — LANOLIN ALCOHOL/MO/W.PET/CERES
100 CREAM (GRAM) TOPICAL DAILY
Qty: 30 TABLET | Refills: 11 | Status: SHIPPED | OUTPATIENT
Start: 2025-05-17 | End: 2025-05-27 | Stop reason: SDUPTHER

## 2025-05-16 RX ADMIN — Medication 1 TABLET: at 09:00

## 2025-05-16 RX ADMIN — FOLIC ACID 1 MG: 1 TABLET ORAL at 09:00

## 2025-05-16 RX ADMIN — FAMOTIDINE 20 MG: 20 TABLET, FILM COATED ORAL at 09:00

## 2025-05-16 RX ADMIN — ATORVASTATIN CALCIUM 10 MG: 10 TABLET, FILM COATED ORAL at 09:00

## 2025-05-16 RX ADMIN — THIAMINE HYDROCHLORIDE 100 MG: 100 INJECTION, SOLUTION INTRAMUSCULAR; INTRAVENOUS at 09:00

## 2025-05-16 RX ADMIN — SODIUM CHLORIDE 1 G: 1 TABLET ORAL at 09:00

## 2025-05-16 RX ADMIN — LORAZEPAM 1 MG: 2 INJECTION INTRAMUSCULAR; INTRAVENOUS at 08:43

## 2025-05-16 RX ADMIN — METOPROLOL SUCCINATE 50 MG: 50 TABLET, EXTENDED RELEASE ORAL at 09:00

## 2025-05-16 RX ADMIN — AMLODIPINE BESYLATE 10 MG: 10 TABLET ORAL at 09:00

## 2025-05-16 ASSESSMENT — COGNITIVE AND FUNCTIONAL STATUS - GENERAL
CLIMB 3 TO 5 STEPS WITH RAILING: A LITTLE
WALKING IN HOSPITAL ROOM: A LITTLE
MOBILITY SCORE: 22
DAILY ACTIVITIY SCORE: 24

## 2025-05-16 ASSESSMENT — PAIN SCALES - GENERAL: PAINLEVEL_OUTOF10: 0 - NO PAIN

## 2025-05-16 ASSESSMENT — LIFESTYLE VARIABLES
NAUSEA AND VOMITING: NO NAUSEA AND NO VOMITING
HEADACHE, FULLNESS IN HEAD: NOT PRESENT
TREMOR: NO TREMOR
PAROXYSMAL SWEATS: NO SWEAT VISIBLE
ANXIETY: 3
AGITATION: 2
VISUAL DISTURBANCES: NOT PRESENT
AUDITORY DISTURBANCES: NOT PRESENT
TOTAL SCORE: 9
ORIENTATION AND CLOUDING OF SENSORIUM: DISORIENTED FOR PLACE OR PERSON

## 2025-05-16 ASSESSMENT — PAIN - FUNCTIONAL ASSESSMENT: PAIN_FUNCTIONAL_ASSESSMENT: 0-10

## 2025-05-16 NOTE — TELEPHONE ENCOUNTER
JUANITA Brito - Patient discharge home today.  Per DC summary: During hospital course patient was seen by nephrology.  Her sodium has improved to 128.  She will be discharged home on a fluid restriction of 1.5 L and sodium tablets.  She will follow-up with nephrology in 2 weeks on an outpatient basis.  She will have a BMP drawn in 1 week.  Patient will be discharged back to her assisted living, [facility].  Daughter, Leanne, has been updated and is in agreement.  You have a [facility] visit scheduled on 5/27/25.

## 2025-05-16 NOTE — DISCHARGE SUMMARY
Discharge Diagnosis  Generalized weakness           Issues Requiring Follow-Up  Hyponatremia    Discharge Meds     Medication List      START taking these medications     folic acid 1 mg tablet; Commonly known as: Folvite; Take 1 tablet (1 mg)   by mouth once daily.; Start taking on: May 17, 2025   sodium chloride 1,000 mg tablet; Take 1 tablet (1 g) by mouth 2 times   daily (morning and late afternoon).   thiamine 100 mg tablet; Commonly known as: Vitamin B-1; Take 1 tablet   (100 mg) by mouth once daily. Do not fill before May 17, 2025.; Start   taking on: May 17, 2025     CONTINUE taking these medications     acetaminophen 650 mg ER tablet; Commonly known as: Tylenol 8 HOUR   amLODIPine 10 mg tablet; Commonly known as: Norvasc   atorvastatin 10 mg tablet; Commonly known as: Lipitor   CENTRUM SILVER ORAL   Centrum Silver Women 8 mg iron-400 mcg-50 mcg tablet; Generic drug:   multivit-min-iron-FA-vit K-lut   lidocaine 5 % patch; Commonly known as: Lidoderm   metoprolol succinate XL 50 mg 24 hr tablet; Commonly known as:   Toprol-XL; take 1 tablet by mouth once daily   mirtazapine 15 mg tablet; Commonly known as: Remeron; TAKE ONE TABLET BY   MOUTH AT BEDTIME ONCE A DAY   omeprazole 20 mg DR capsule; Commonly known as: PriLOSEC   Os-Jory 500 + D3 500 mg-5 mcg (200 unit) tablet; Generic drug: calcium   carbonate-vitamin D3     STOP taking these medications     ALPRAZolam 0.25 mg tablet; Commonly known as: Xanax   loperamide 2 mg capsule; Commonly known as: Imodium   tiZANidine 4 mg capsule; Commonly known as: Zanaflex       Test Results Pending At Discharge  Pending Labs       Order Current Status    Methylmalonic acid, serum In process    Blood Culture Preliminary result    Blood Culture Preliminary result            Hospital Course  HPI from admission  Stephanie Jimenez is a 89 y.o. female presenting with altered mental status.     This is a 89-year-old female with past medical history of alcohol abuse, paroxysmal  A-fib not on AC, diastolic heart failure, HLD, HTN, anxiety/depression, presented to the [facility] ED from assisted living with fatigue and malaise.  Reported 3 days of worsening confusion.  Patient is currently alert and oriented x 3 and seems pretty much with it.  But at the time of ED visit she had reported that she does not feel right.  Daughter was present at time of ED visit who mentioned she was called to  from assisted living due to patient being more confused than normal.  Facility had concerns of possible UTI.  There was denial of fall, fever, chills, chest pain, abdominal pain or back pain.  There was a note in the ED that daughter felt that mother may be drinking again possible alcohol withdrawal.  ED physician did not feel so at the time.  When asked the patient how much she drinks she says she drinks 3 to 4 glasses of whiskey 1-2 times per week.  She denies decreased oral intake.  She also endorsed a headache on ED admission which has pretty much resolved by now.     CT of the head performed nonacute with mild generalized atrophy and moderate chronic microvascular ischemia throughout the supratentorial white matter. Chest x-ray also showed chronic changes with no definite acute disease.  Viral respiratory panel negative.  Urinalysis appears noninfectious.   patient did take her Xanax tonight at the ED.     [facility] ED notable labs: Sodium 123, osmolality 260, chloride 90, TSH elevated, lactic acid 2.7, WBC 14.35     On admission, vital signs stable.     Admitted for altered mental status and hyponatremia       During hospital course patient was seen by nephrology.  Her sodium has improved to 128.  She will be discharged home on a fluid restriction of 1.5 L and sodium tablets.  She will follow-up with nephrology in 2 weeks on an outpatient basis.  She will have a BMP drawn in 1 week.  Patient will be discharged back to her assisted living, [facility].  Daughter, Leanne, has been updated and is in  agreement    Above Case and plan discussed collaborating physician, time spent on discharge 40 minutes  Pertinent Physical Exam At Time of Discharge  Physical Exam  Constitutional:       General: She is not in acute distress.     Appearance: She is not toxic-appearing.   Cardiovascular:      Rate and Rhythm: Normal rate and regular rhythm.      Pulses: Normal pulses.      Heart sounds: Normal heart sounds.   Pulmonary:      Effort: Pulmonary effort is normal. No respiratory distress.      Breath sounds: Normal breath sounds. No wheezing, rhonchi or rales.   Abdominal:      General: Bowel sounds are normal.      Palpations: Abdomen is soft.   Musculoskeletal:      Right lower leg: No edema.      Left lower leg: No edema.   Skin:     General: Skin is warm and dry.   Neurological:      General: No focal deficit present.      Mental Status: She is alert. She is disoriented.   Psychiatric:         Mood and Affect: Affect is angry.         Cognition and Memory: Cognition is impaired. Memory is impaired.         Outpatient Follow-Up  Future Appointments   Date Time Provider Department Center   5/27/2025  8:30 AM Alisha Torrez APRN-CNP [id number]   6/4/2025 10:00 AM Ezequiel Hernandez MD [id number]         Lala Liu APRN-CNP

## 2025-05-16 NOTE — CARE PLAN
The patient's goals for the shift include  rest   Problem: Chronic Conditions and Co-morbidities  Goal: Patient's chronic conditions and co-morbidity symptoms are monitored and maintained or improved  Outcome: Progressing     Problem: Nutrition  Goal: Nutrient intake appropriate for maintaining nutritional needs  Outcome: Progressing       The clinical goals for the shift include safety

## 2025-05-16 NOTE — PROGRESS NOTES
Occupational Therapy                 Therapy Communication Note    Patient Name: Stephanie Jimenez  MRN: [id number]  Department: [facility] 3 S  Room: 315315A  Today's Date: 5/16/2025     Discipline: Occupational Therapy    Missed Visit: OT Missed Visit: Yes     Missed Visit Reason: Missed Visit Reason: Other (Comment) (PCA/nsg confirmed DC in ~1hr.  OT treatment deferred)    Missed Time: Cancel    Comment: 5/16 cx

## 2025-05-16 NOTE — PROGRESS NOTES
05/16/25 1112   Discharge Planning   Living Arrangements Other (Comment)  (AL)   Support Systems Children;Family members   Type of Residence Assisted living   Care Facility Name [facility]   Home or Post Acute Services Post acute facilities (Rehab/SNF/etc)   Type of Post Acute Facility Services Assisted living   Expected Discharge Disposition Home  ([facility])   Does the patient need discharge transport arranged? No     PLAN: Discharge back to [facility], daughter will provide transportation.

## 2025-05-17 LAB
ATRIAL RATE: 80 BPM
METHYLMALONATE SERPL-SCNC: 0.11 UMOL/L (ref 0–0.4)
P AXIS: 16 DEGREES
P OFFSET: 184 MS
P ONSET: 137 MS
PR INTERVAL: 162 MS
Q ONSET: 218 MS
QRS COUNT: 14 BEATS
QRS DURATION: 90 MS
QT INTERVAL: 388 MS
QTC CALCULATION(BAZETT): 447 MS
QTC FREDERICIA: 427 MS
R AXIS: -42 DEGREES
T AXIS: 48 DEGREES
T OFFSET: 412 MS
VENTRICULAR RATE: 80 BPM

## 2025-05-17 NOTE — DOCUMENTATION CLARIFICATION NOTE
"    PATIENT:               INRU FERNANDEZ  ACCT #:                  [id number]  MRN:                       [id number]  :                       1935  ADMIT DATE:       2025 12:16 AM  DISCH DATE:        2025 11:39 AM  RESPONDING PROVIDER #:        [id number]          PROVIDER RESPONSE TEXT:    Altered Mental Status due to alcohol withdrawal    CDI QUERY TEXT:    Clarification      Instruction:    Based on your assessment of the patient and the clinical information, please provide the requested documentation by clicking on the appropriate radio button and enter any additional information if prompted.    Question: Please further clarify the most likely etiology of Altered Mental Status on admission after final work up    When answering this query, please exercise your independent professional judgment. The fact that a question is being asked, does not imply that any particular answer is desired or expected.    The patient's clinical indicators include:  Clinical Information:  Pt admitted from [facility] ED for altered mental status and headache.    Clinical Indicators:    :    5/15:    :       H&P:  \" ... presented to the [facility] ED from assisted living with fatigue and malaise.  Reported 3 days of worsening confusion.  Patient is currently alert and oriented x3 and seems pretty much with it. ... There was a note in the ED that daughter felt that mother may be drinking again possible alcohol withdrawal.  ED physician did not fell so at the time.  When asked the patient how much she drinks she says she drinks 3 to 4 glasses of whiskey 1-2 times per week.  She denies decreased oral intake.\" \"Possible symptomatic hyponatremia, she also does have slight unexplained leukocytosis at [facility] which resolved on admission labs here.\" \"AMS ... Acute metabolic encephalopathy ... \"    5/15 PN:  \"She is a little more confused today. ... She has been drinking a decent amount of fluids, she thinks it " "is more than her fluid restriction.\"    5/16 DS:  \"There was a note in the ED that daughter felt that mother may be drinking again possible alcohol withdrawal.\" \"Admitted for altered mental status and hyponatremia\"    Treatment: Fluid restriction, Thiamine 100mg qd, Folic Acid 1mg qd, NaChloride 1g bid, Ativan IV for CIWA protocol, Nephrology consult    Risk Factors:  Alcohol Use, decent increase in fluid consumption, Electrolyte imbalance  Options provided:  -- Altered Mental Status due to SIADH  -- Altered Mental Status due to alcohol withdrawal  -- Other - I will add my own diagnosis  -- Refer to Clinical Documentation Reviewer    Query created by: Vladimir Hernandez on 5/17/2025 7:09 AM      Electronically signed by:  ALEM BARROW 5/17/2025 7:16 AM          "

## 2025-05-18 LAB
BACTERIA BLD CULT: NORMAL
BACTERIA BLD CULT: NORMAL

## 2025-05-23 ENCOUNTER — TELEPHONE (OUTPATIENT)
Dept: PRIMARY CARE | Facility: CLINIC | Age: 89
End: 2025-05-23
Payer: MEDICARE

## 2025-05-23 DIAGNOSIS — E87.1 HYPONATREMIA: ICD-10-CM

## 2025-05-27 ENCOUNTER — OFFICE VISIT (OUTPATIENT)
Dept: PRIMARY CARE | Facility: CLINIC | Age: 89
End: 2025-05-27
Payer: MEDICARE

## 2025-05-27 VITALS
TEMPERATURE: 97.3 F | OXYGEN SATURATION: 98 % | RESPIRATION RATE: 16 BRPM | HEIGHT: 65 IN | DIASTOLIC BLOOD PRESSURE: 62 MMHG | BODY MASS INDEX: 22.16 KG/M2 | HEART RATE: 82 BPM | WEIGHT: 133 LBS | SYSTOLIC BLOOD PRESSURE: 112 MMHG

## 2025-05-27 DIAGNOSIS — F10.10 ETOH ABUSE: ICD-10-CM

## 2025-05-27 DIAGNOSIS — R53.1 GENERALIZED WEAKNESS: ICD-10-CM

## 2025-05-27 DIAGNOSIS — G93.41 ACUTE METABOLIC ENCEPHALOPATHY: Primary | ICD-10-CM

## 2025-05-27 DIAGNOSIS — R40.4 TRANSIENT ALTERATION OF AWARENESS: ICD-10-CM

## 2025-05-27 DIAGNOSIS — E87.1 HYPONATREMIA: ICD-10-CM

## 2025-05-27 LAB
ANION GAP SERPL CALCULATED.4IONS-SCNC: 9 MMOL/L (CALC) (ref 7–17)
BUN SERPL-MCNC: 9 MG/DL (ref 7–25)
BUN/CREAT SERPL: NORMAL (CALC) (ref 6–22)
CALCIUM SERPL-MCNC: 8.8 MG/DL (ref 8.6–10.4)
CHLORIDE SERPL-SCNC: 101 MMOL/L (ref 98–110)
CO2 SERPL-SCNC: 27 MMOL/L (ref 20–32)
CREAT SERPL-MCNC: 0.66 MG/DL (ref 0.6–0.95)
EGFRCR SERPLBLD CKD-EPI 2021: 84 ML/MIN/1.73M2
GLUCOSE SERPL-MCNC: 91 MG/DL (ref 65–99)
POTASSIUM SERPL-SCNC: 4.1 MMOL/L (ref 3.5–5.3)
SODIUM SERPL-SCNC: 137 MMOL/L (ref 135–146)

## 2025-05-27 PROCEDURE — 1159F MED LIST DOCD IN RCRD: CPT | Performed by: NURSE PRACTITIONER

## 2025-05-27 PROCEDURE — 1036F TOBACCO NON-USER: CPT | Performed by: NURSE PRACTITIONER

## 2025-05-27 PROCEDURE — 1160F RVW MEDS BY RX/DR IN RCRD: CPT | Performed by: NURSE PRACTITIONER

## 2025-05-27 PROCEDURE — 3074F SYST BP LT 130 MM HG: CPT | Performed by: NURSE PRACTITIONER

## 2025-05-27 PROCEDURE — 3078F DIAST BP <80 MM HG: CPT | Performed by: NURSE PRACTITIONER

## 2025-05-27 PROCEDURE — 1126F AMNT PAIN NOTED NONE PRSNT: CPT | Performed by: NURSE PRACTITIONER

## 2025-05-27 PROCEDURE — 36415 COLL VENOUS BLD VENIPUNCTURE: CPT | Performed by: NURSE PRACTITIONER

## 2025-05-27 PROCEDURE — 99349 HOME/RES VST EST MOD MDM 40: CPT | Performed by: NURSE PRACTITIONER

## 2025-05-27 PROCEDURE — 1111F DSCHRG MED/CURRENT MED MERGE: CPT | Performed by: NURSE PRACTITIONER

## 2025-05-27 RX ORDER — FOLIC ACID 1 MG/1
1 TABLET ORAL DAILY
Qty: 30 TABLET | Refills: 11 | Status: SHIPPED | OUTPATIENT
Start: 2025-05-27

## 2025-05-27 RX ORDER — SODIUM CHLORIDE 1000 MG
1000 TABLET, SOLUBLE MISCELLANEOUS
Qty: 60 TABLET | Refills: 11 | Status: SHIPPED | OUTPATIENT
Start: 2025-05-27

## 2025-05-27 RX ORDER — LANOLIN ALCOHOL/MO/W.PET/CERES
100 CREAM (GRAM) TOPICAL DAILY
Qty: 30 TABLET | Refills: 11 | Status: SHIPPED | OUTPATIENT
Start: 2025-05-27

## 2025-05-27 ASSESSMENT — ENCOUNTER SYMPTOMS
DYSPHORIC MOOD: 1
ACTIVITY CHANGE: 0
CHILLS: 0
VOMITING: 0
SORE THROAT: 0
COUGH: 0
UNEXPECTED WEIGHT CHANGE: 0
SHORTNESS OF BREATH: 0
DYSURIA: 0
TROUBLE SWALLOWING: 0
PALPITATIONS: 0
WHEEZING: 0
APPETITE CHANGE: 0
DIFFICULTY URINATING: 0
BACK PAIN: 1
SPEECH DIFFICULTY: 0
DIARRHEA: 0
SLEEP DISTURBANCE: 0
FATIGUE: 0
ARTHRALGIAS: 1
NERVOUS/ANXIOUS: 1
DIZZINESS: 0
CONSTIPATION: 0
FEVER: 0
ABDOMINAL PAIN: 0
NAUSEA: 0
WOUND: 0
LIGHT-HEADEDNESS: 0
HEMATURIA: 0
BRUISES/BLEEDS EASILY: 0
CHEST TIGHTNESS: 0
SINUS PRESSURE: 0

## 2025-05-27 ASSESSMENT — PAIN SCALES - GENERAL: PAINLEVEL_OUTOF10: 0-NO PAIN

## 2025-05-27 NOTE — PROGRESS NOTES
Subjective   Patient ID: Stephanie Jimenez is a 89 y.o. female who presents for Hospital Follow-up (Altered mental status, hyponatremia, weakness).    Visit for 88 y/o female seen today at [facility] in [place] for Hospital follow up. Pt initially presented to [facility] ED on 5/13 for evaluation of confusion, possible UTI. She was transferred to [facility], admitted from 5/14-5/16 with altered mental status, hyponatremia, generalized weakness.      Hospital Course: This is a 89-year-old female with past medical history of alcohol abuse, paroxysmal A-fib not on AC, diastolic heart failure, HLD, HTN, anxiety/depression, presented to the [facility] ED from assisted living with fatigue and malaise.  Reported 3 days of worsening confusion.  Patient is currently alert and oriented x 3 and seems pretty much with it.  But at the time of ED visit she had reported that she does not feel right.  Daughter was present at time of ED visit who mentioned she was called to  from assisted living due to patient being more confused than normal.  Facility had concerns of possible UTI.  There was denial of fall, fever, chills, chest pain, abdominal pain or back pain.  There was a note in the ED that daughter felt that mother may be drinking again possible alcohol withdrawal.  ED physician did not feel so at the time.  When asked the patient how much she drinks she says she drinks 3 to 4 glasses of whiskey 1-2 times per week.  She denies decreased oral intake.  She also endorsed a headache on ED admission which has pretty much resolved by now. CT of the head performed nonacute with mild generalized atrophy and moderate chronic microvascular ischemia throughout the supratentorial white matter. Chest x-ray also showed chronic changes with no definite acute disease.  Viral respiratory panel negative.  Urinalysis appears noninfectious. Patient did take her Xanax tonight at the ED. [facility] ED notable labs: Sodium 123,  "osmolality 260, chloride 90, TSH elevated, lactic acid 2.7, WBC 14.35. On admission, vital signs stable. Admitted for altered mental status and hyponatremia. During hospital course patient was seen by nephrology.  Her sodium has improved to 128.  She will be discharged home on a fluid restriction of 1.5 L and sodium tablets.  She will follow-up with nephrology in 2 weeks on an outpatient basis.  She will have a BMP drawn in 1 week.  Patient will be discharged back to her assisted living, [facility]  Daughter, Leanne, has been updated and is in agreement    Patient is seen in her room this morning. She is lying in bed. Pt is alert, oriented to person, place, time and situation. When conversing with patient, there is occasional bouts of confusion. Pt will occasionally ask how long ago she was in the hospital and she reports following up with a doctor post hospitalization but then states \"well I think I did, I mean I guess so, that's what you're supposed to do\". Patient has PMHx HTN, HLD, Paroxysmal Atrial Fibrillation, GERD, Osteoporosis, chronic pain, lumbar spondylosis, anxiety. Pt is able to perform her ADLs including dressing, toileting and showering but does require assistance with her medications and meal preparation. She reports that she started taking sodium tablets and they are \"blah, taste so bad\". She is not sure if she is taking the Thiamine or Folic acid. Prescriptions were sent to [facility] pharmacy but patient uses [facility] at the [facility]. Pt is ambulatory. She does not use any assistive devices in her apartment but does use a walker if going down to the dining room or leaving the facility. Pt denies recent fall or injury. She denies any back pain today, reports it to be much improved. She is active with pain management and has a follow up scheduled on 6/4/25. Pt had hyponatremia during hospitalization. Last Na+ 128. Pt is trying to comply with her fluid restriction. She is due for labs " today.          Current Outpatient Medications:     acetaminophen (Tylenol 8 HOUR) 650 mg ER tablet, Take 2 tablets (1,300 mg) by mouth every 8 hours if needed for mild pain (1 - 3), moderate pain (4 - 6) or headaches., Disp: , Rfl:     amLODIPine (Norvasc) 10 mg tablet, Take 1 tablet (10 mg) by mouth once daily., Disp: , Rfl:     atorvastatin (Lipitor) 10 mg tablet, Take 1 tablet (10 mg) by mouth once daily., Disp: , Rfl:     calcium carbonate-vitamin D3 (Os-Jory 500 + D3) 500 mg-5 mcg (200 unit) tablet, Take 1 tablet by mouth once daily., Disp: , Rfl:     Centrum Silver Women 8 mg iron-400 mcg-50 mcg tablet, , Disp: , Rfl:     folic acid (Folvite) 1 mg tablet, Take 1 tablet (1 mg) by mouth once daily., Disp: 30 tablet, Rfl: 11    folic acid/multivit-min/lutein (CENTRUM SILVER ORAL), Take 1 tablet by mouth once daily., Disp: , Rfl:     lidocaine (Lidoderm) 5 % patch, Place 1 patch on the skin once daily. Remove after 12 hours, Disp: , Rfl:     metoprolol succinate XL (Toprol-XL) 50 mg 24 hr tablet, take 1 tablet by mouth once daily, Disp: 30 tablet, Rfl: 5    mirtazapine (Remeron) 15 mg tablet, TAKE ONE TABLET BY MOUTH AT BEDTIME ONCE A DAY, Disp: 30 tablet, Rfl: 0    omeprazole (PriLOSEC) 20 mg DR capsule, Take 1 capsule (20 mg) by mouth once daily in the morning. Take before meals. 30 minutes prior, Disp: , Rfl:     sodium chloride 1,000 mg tablet, Take 1 tablet (1 g) by mouth 2 times daily (morning and late afternoon)., Disp: 60 tablet, Rfl: 11    thiamine (Vitamin B-1) 100 mg tablet, Take 1 tablet (100 mg) by mouth once daily., Disp: 30 tablet, Rfl: 11     Review of Systems   Constitutional:  Negative for activity change, appetite change, chills, fatigue, fever and unexpected weight change.   HENT:  Negative for congestion, hearing loss, sinus pressure, sore throat and trouble swallowing.    Respiratory:  Negative for cough, chest tightness, shortness of breath and wheezing.    Cardiovascular:  Negative for  "chest pain and palpitations.   Gastrointestinal:  Negative for abdominal pain, constipation, diarrhea, nausea and vomiting.   Endocrine: Negative for cold intolerance.   Genitourinary:  Negative for difficulty urinating, dysuria and hematuria.   Musculoskeletal:  Positive for arthralgias, back pain and gait problem (uses walker if leaving apartment).   Skin:  Negative for rash and wound.   Neurological:  Negative for dizziness, speech difficulty and light-headedness.        Positive for occasional confusion   Hematological:  Does not bruise/bleed easily.   Psychiatric/Behavioral:  Positive for dysphoric mood. Negative for sleep disturbance. The patient is nervous/anxious.      Objective   /62 (BP Location: Right arm, Patient Position: Sitting, BP Cuff Size: Adult)   Pulse 82   Temp 36.3 °C (97.3 °F) (Temporal)   Resp 16   Ht 1.651 m (5' 5\")   Wt 60.3 kg (133 lb)   SpO2 98%   BMI 22.13 kg/m²     Physical Exam  Constitutional:       General: She is awake.      Appearance: Normal appearance. She is well-developed.      Comments: Alert, no acute distress  HENT:      Nose: Nose normal.      Mouth/Throat:      Mouth: Mucous membranes are moist.   Eyes:      Extraocular Movements: Extraocular movements intact.      Pupils: Pupils are equal, round, and reactive to light.   Cardiovascular:      Rate and Rhythm: Normal rate and regular rhythm.      Pulses: Normal pulses.      Heart sounds: Normal heart sounds.     No edema.   Pulmonary:      Effort: Pulmonary effort is normal.      Breath sounds: Normal breath sounds. No wheezing, rhonchi or rales.   Abdominal:      General: Abdomen is flat.      Palpations: Abdomen is soft. There is no mass.      Tenderness: There is no abdominal tenderness. There is no guarding. No CVA tenderness.   Musculoskeletal:      Cervical back: Neck supple.   Neurological:      Mental Status: She is alert and oriented to person, place, and time.      Cranial Nerves: Cranial nerves 2-12 " are intact.   Psychiatric:         Mood and Affect: Mood is anxious but she is cooperative.      Behavior: Behavior is cooperative.     Assessment/Plan   Diagnoses and all orders for this visit:  Acute metabolic encephalopathy  Transient alteration of awareness  -status post hospitalization with improvement   -mentation at baseline today  -recommend follow up with nephrology per hospital discharge instructions     Hyponatremia  -     sodium chloride 1,000 mg tablet; Take 1 tablet (1 g) by mouth 2 times daily (morning and late afternoon).  -status post hospitalization, last Na+ 128, will check BMP today    ETOH abuse  -     thiamine (Vitamin B-1) 100 mg tablet; Take 1 tablet (100 mg) by mouth once daily.  -     folic acid (Folvite) 1 mg tablet; Take 1 tablet (1 mg) by mouth once daily.  -chronic, history of, pt denies any recent ETOH use, reports drinking socially at facility     Generalized weakness  -chronic, stable, gait unsteady, no recent falls reported  -recommend using walker when ambulating to decrease fall risk     BMP obtained- pt tolerated well     Will see patient in 4 weeks as she is at increased risk for rehospitalization. Advised pt to contact house calls office with any acute concerns or mediation needs.          Alisha Torrez, APRN-CNP

## 2025-06-04 ENCOUNTER — OFFICE VISIT (OUTPATIENT)
Dept: PAIN MEDICINE | Facility: CLINIC | Age: 89
End: 2025-06-04
Payer: MEDICARE

## 2025-06-04 VITALS — OXYGEN SATURATION: 98 % | DIASTOLIC BLOOD PRESSURE: 60 MMHG | HEART RATE: 88 BPM | SYSTOLIC BLOOD PRESSURE: 98 MMHG

## 2025-06-04 DIAGNOSIS — M47.816 LUMBAR SPONDYLOSIS: ICD-10-CM

## 2025-06-04 DIAGNOSIS — M79.18 DIFFUSE MYOFASCIAL PAIN SYNDROME: ICD-10-CM

## 2025-06-04 DIAGNOSIS — G89.29 CHRONIC BILATERAL LOW BACK PAIN WITHOUT SCIATICA: ICD-10-CM

## 2025-06-04 DIAGNOSIS — M54.50 CHRONIC BILATERAL LOW BACK PAIN WITHOUT SCIATICA: ICD-10-CM

## 2025-06-04 DIAGNOSIS — Z98.890 S/P KYPHOPLASTY: Primary | ICD-10-CM

## 2025-06-04 PROCEDURE — 1160F RVW MEDS BY RX/DR IN RCRD: CPT | Performed by: ANESTHESIOLOGY

## 2025-06-04 PROCEDURE — 3074F SYST BP LT 130 MM HG: CPT | Performed by: ANESTHESIOLOGY

## 2025-06-04 PROCEDURE — 99213 OFFICE O/P EST LOW 20 MIN: CPT | Performed by: ANESTHESIOLOGY

## 2025-06-04 PROCEDURE — 3078F DIAST BP <80 MM HG: CPT | Performed by: ANESTHESIOLOGY

## 2025-06-04 PROCEDURE — 1111F DSCHRG MED/CURRENT MED MERGE: CPT | Performed by: ANESTHESIOLOGY

## 2025-06-04 PROCEDURE — 1159F MED LIST DOCD IN RCRD: CPT | Performed by: ANESTHESIOLOGY

## 2025-06-04 PROCEDURE — G2211 COMPLEX E/M VISIT ADD ON: HCPCS | Performed by: ANESTHESIOLOGY

## 2025-06-04 ASSESSMENT — ENCOUNTER SYMPTOMS
DEPRESSION: 0
CARDIOVASCULAR NEGATIVE: 1
GASTROINTESTINAL NEGATIVE: 1
HEMATOLOGIC/LYMPHATIC NEGATIVE: 1
ENDOCRINE NEGATIVE: 1
NEUROLOGICAL NEGATIVE: 1
CONSTITUTIONAL NEGATIVE: 1
LOSS OF SENSATION IN FEET: 0
OCCASIONAL FEELINGS OF UNSTEADINESS: 0
EYES NEGATIVE: 1
RESPIRATORY NEGATIVE: 1
BACK PAIN: 1
PSYCHIATRIC NEGATIVE: 1

## 2025-06-04 ASSESSMENT — PAIN SCALES - GENERAL
PAINLEVEL_OUTOF10: 5 - MODERATE PAIN
PAINLEVEL_OUTOF10: 5

## 2025-06-04 ASSESSMENT — PAIN - FUNCTIONAL ASSESSMENT: PAIN_FUNCTIONAL_ASSESSMENT: 0-10

## 2025-06-04 ASSESSMENT — PAIN DESCRIPTION - DESCRIPTORS: DESCRIPTORS: TENDER

## 2025-06-04 NOTE — PROGRESS NOTES
PAIN MANAGEMENT FOLLOW-UP OFFICE NOTE    Date of Service: 6/4/2025    SUBJECTIVE    CHIEF COMPLAINT: LBP    HISTORY OF PRESENT ILLNESS    Stephanie Jimenez is a 89 y.o. female with PMH HTN, GERD, PAF, dementia, SCC s/p Mohs who presents for F/U LBP. Pt is a poor historian and her daughter is present to assist with hx.     Since her last visit, pt was admitted 5/14-16 for AMS attributed to hyponatremia and has plan to F/U with nephro. In meantime, LBP stable and controlled. She is content with pain control.     Pt denies new-onset numbness, weakness, bowel/bladder incontinence.  Pt denies recent infection, allergy to Latex/iodine/contrast. Patient is currently taking the following blood thinner(s): N/A    Procedure log:  -BL LMBN RFA 11/21/24: 90% ongoing relief  -BL LMBNB #2 11/7/24: 80% relief  -BL LMBNB #1 10/17/24: 80% relief  -TPI/L VANITA CSI 8/29/24: 75% ongoing relief  -T11 kypho 7/16/24: 100%    REVIEW OF SYSTEMS  Review of Systems   Constitutional: Negative.    HENT: Negative.     Eyes: Negative.    Respiratory: Negative.     Cardiovascular: Negative.    Gastrointestinal: Negative.    Endocrine: Negative.    Musculoskeletal:  Positive for back pain.   Skin: Negative.    Neurological: Negative.    Hematological: Negative.    Psychiatric/Behavioral: Negative.         PAST MEDICAL HISTORY  Past Medical History:   Diagnosis Date    Actinic keratosis of scalp     Anxiety state     Asthma     Dr. Chau    Cystocele, unspecified     Gastritis     Hemorrhoids     Hiatal hernia     Hypercholesterolemia     Hypertension     Osteoporosis     halo fracture right humerus    Prediabetes     Sciatica      Past Surgical History:   Procedure Laterality Date    BIOPSY  2013    lip, noncancerous    BIOPSY Left 03/2021    left leg - apex derm    BIOPSY  03/2021    forehead - apex derm    BLADDER SUSPENSION  1979    COLONOSCOPY  2002    COLONOSCOPY  11/2015    CYSTOSCOPY  2004    with suburethral sling     ESOPHAGOGASTRODUODENOSCOPY      HIP SURGERY Right 06/2019    right hip/femur    TOTAL ABDOMINAL HYSTERECTOMY W/ BILATERAL SALPINGOOPHORECTOMY  1977    URETHRAL SLING  2004    suburethral sling and cystoscopy     Family History   Problem Relation Name Age of Onset    Heart disease Mother      Lung cancer Father      Other (mouth cancer) Sister      Lung cancer Sister      Heart attack Sister      No Known Problems Sister      Lung cancer Brother      No Known Problems Daughter      No Known Problems Son         CURRENT MEDICATIONS  Current Outpatient Medications   Medication Sig Dispense Refill    acetaminophen (Tylenol 8 HOUR) 650 mg ER tablet Take 2 tablets (1,300 mg) by mouth every 8 hours if needed for mild pain (1 - 3), moderate pain (4 - 6) or headaches.      amLODIPine (Norvasc) 10 mg tablet Take 1 tablet (10 mg) by mouth once daily.      atorvastatin (Lipitor) 10 mg tablet Take 1 tablet (10 mg) by mouth once daily.      calcium carbonate-vitamin D3 (Os-Jory 500 + D3) 500 mg-5 mcg (200 unit) tablet Take 1 tablet by mouth once daily.      Centrum Silver Women 8 mg iron-400 mcg-50 mcg tablet       folic acid (Folvite) 1 mg tablet Take 1 tablet (1 mg) by mouth once daily. 30 tablet 11    folic acid/multivit-min/lutein (CENTRUM SILVER ORAL) Take 1 tablet by mouth once daily.      lidocaine (Lidoderm) 5 % patch Place 1 patch on the skin once daily. Remove after 12 hours      metoprolol succinate XL (Toprol-XL) 50 mg 24 hr tablet take 1 tablet by mouth once daily 30 tablet 5    mirtazapine (Remeron) 15 mg tablet TAKE ONE TABLET BY MOUTH AT BEDTIME ONCE A DAY 30 tablet 0    omeprazole (PriLOSEC) 20 mg DR capsule Take 1 capsule (20 mg) by mouth once daily in the morning. Take before meals. 30 minutes prior      sodium chloride 1,000 mg tablet Take 1 tablet (1 g) by mouth 2 times daily (morning and late afternoon). 60 tablet 11    thiamine (Vitamin B-1) 100 mg tablet Take 1 tablet (100 mg) by mouth once daily. 30 tablet  11     No current facility-administered medications for this visit.       ALLERGIES AND DRUG REACTIONS  No Known Allergies         OBJECTIVE  Visit Vitals  BP 98/60   Pulse 88   SpO2 98%   OB Status Hysterectomy   Smoking Status Former       Last Recorded Pain Score (if available):       Pain Score:   5       Physical Exam  Vitals and nursing note reviewed.       General: Sitting in chair, NAD  Head: NCAT  Eyes: Sclera/conjunctiva clear, EOMI, PERRL  Nose/mouth: MMM  CV: Good distal pulses  Lungs: Good/equal chest excursion  Abdomen: Soft, ND  Ext: No cyanosis/edema  MSK: L-spine alignment: thoracic kyphosis, BL paraspinal m TTP,  ROM: +facet-loading BL    Neuro: AAOx3, CN Grossly nl  Dermatome sensation to light touch  LEFT L1 (lower pelvis/upper thigh): WNL    RIGHT L1: WNL      LEFT L2 (upper thigh): WNL       RIGHT: L2:WNL      LEFT L3 (medial knee): WNL       RIGHT L3: WNL      LEFT L4 (superior medial malleolus): WNL       RIGHT L4: WNL      LEFT L5 (dorsal foot): WNL       RIGHT L5: WNL      LEFT S1 (lateral foot): WNL     RIGHT S1: WNL      LEFT S2 (popliteal fossa): WNL    RIGHT S2: WNL        Motor strength  LEFT L2 (hip flexion): 5/5   RIGHT L2: 5/5  LEFT L3 (knee extension): 5/5     RIGHT L3: 5/5  LEFT L4 (dorsiflexion): 5/5     RIGHT L4: 5/5  LEFT L5 (EHL extension): 5/5     RIGHT L5: 5/5  LEFT S1 (plantarflexion): 5/5     RIGHT S1: 5/5  LEFT S2 (knee flexion): 5/5      RIGHT S2: 5/5      Psych: affect nl  Skin: no rash/lesions      REVIEW OF LABORATORY DATA  I have reviewed the following lab results:  WBC   Date Value Ref Range Status   05/16/2025 8.0 4.4 - 11.3 x10*3/uL Final     RBC   Date Value Ref Range Status   05/16/2025 4.09 4.00 - 5.20 x10*6/uL Final     Hemoglobin   Date Value Ref Range Status   05/16/2025 12.4 12.0 - 16.0 g/dL Final     Hematocrit   Date Value Ref Range Status   05/16/2025 36.4 36.0 - 46.0 % Final     MCV   Date Value Ref Range Status   05/16/2025 89 80 - 100 fL Final     MCH    Date Value Ref Range Status   05/16/2025 30.3 26.0 - 34.0 pg Final     MCHC   Date Value Ref Range Status   05/16/2025 34.1 32.0 - 36.0 g/dL Final     RDW   Date Value Ref Range Status   05/16/2025 12.4 11.5 - 14.5 % Final     Platelets   Date Value Ref Range Status   05/16/2025 288 150 - 450 x10*3/uL Final     MPV   Date Value Ref Range Status   08/21/2023 10.4 7.0 - 12.6 CU Final     SODIUM   Date Value Ref Range Status   05/27/2025 137 135 - 146 mmol/L Final     POTASSIUM   Date Value Ref Range Status   05/27/2025 4.1 3.5 - 5.3 mmol/L Final     CARBON DIOXIDE   Date Value Ref Range Status   05/27/2025 27 20 - 32 mmol/L Final     UREA NITROGEN (BUN)   Date Value Ref Range Status   05/27/2025 9 7 - 25 mg/dL Final     CALCIUM   Date Value Ref Range Status   05/27/2025 8.8 8.6 - 10.4 mg/dL Final     Protime   Date Value Ref Range Status   07/25/2022 11.9 9.8 - 13.4 sec Final     INR   Date Value Ref Range Status   07/25/2022 1.0 0.9 - 1.1 Final         REVIEW OF RADIOLOGY   I have reviewed the following:  Radiology Studies           XR L-spine 6/26/24:  Multiple vertebral compression fractures are present and have  worsened.      The previous T12 vertebral compression fracture has shown greater  degree of worsening now with a near vertebral plana appearance.      There is a new mild-to-moderate vertebral compression fracture at T11  with an approximate 40% anterior height loss.      New mild vertebral compression fractures at L1 and progression of the  L2 vertebral compression fracture from prior study.      Increased kyphosis across the lumbar spine      IMPRESSION:  Interval development of and progression of multiple vertebral  compression fractures with increasing kyphosis. If deemed clinically  appropriate, MRI can be considered for further assessment.       ASSESSMENT & PLAN  Stephanie Jimenez is a 89 y.o. female with PMH HTN, GERD, PAF, dementia, SCC s/p Mohs who presents for F/U LB pain    1) Vertebral  compression fx, improved  -T11 kypho 7/16/24: 100% ongoing    2) Myofascial pain, improved  -TPI/L VANITA CSI 8/29/24: 75% ongoing relief  -Repeat PRN    3) Lumbar spondylosis, improved  -Refractive to >4 mo tx including Tylenol, NSAIDs, tramadol, tizanidine, heating pad, >2 w PT, lido patch, [place]  -MRI T/L-spine 7/11/24: multilevel spondylosis  -BL LMBN RFA 11/21/24: 90% ongoing relief  -F/U PRN          Today's visit involved continuation of chronic pain care. In the context of the complexity of this patient's chronic pain diagnosis, long-term expectations and care planning discussed. Adequate time taken to ensure patient understanding and answer questions. Imaging studies ordered are placed do elucidate the patient's diagnosis, but also to evaluate the patient's candidacy for procedural and surgical interventions. The risks and benefits of these potential interventions are detailed as above.                   Ezequiel Hernandez MD  Anesthesiologist & Interventional Pain Physician   [facility]  O: [phone]  F: [phone]  10:43 AM  06/04/25

## 2025-06-05 PROBLEM — M54.50 CHRONIC BILATERAL LOW BACK PAIN WITHOUT SCIATICA: Status: ACTIVE | Noted: 2023-10-01

## 2025-06-19 ENCOUNTER — TELEPHONE (OUTPATIENT)
Dept: PRIMARY CARE | Facility: CLINIC | Age: 89
End: 2025-06-19
Payer: MEDICARE

## 2025-06-19 NOTE — TELEPHONE ENCOUNTER
"6/20/25 [facility] visit with Alisha Torrez NP confirmed via phone with Leanne Caba/ daughter.   JUANITA Perdomo asked if you could \"do a test for dementia\". She reported she recently took her mother to the doctors and \"she was not making much sense\". Short term memory issues noted. She noted she takes mirtazapine at HS and \"is not drinking\".   "

## 2025-06-20 ENCOUNTER — OFFICE VISIT (OUTPATIENT)
Dept: PRIMARY CARE | Facility: CLINIC | Age: 89
End: 2025-06-20
Payer: MEDICARE

## 2025-06-20 VITALS
OXYGEN SATURATION: 99 % | SYSTOLIC BLOOD PRESSURE: 130 MMHG | BODY MASS INDEX: 22.16 KG/M2 | HEIGHT: 65 IN | DIASTOLIC BLOOD PRESSURE: 70 MMHG | WEIGHT: 133 LBS | RESPIRATION RATE: 16 BRPM | HEART RATE: 82 BPM | TEMPERATURE: 98.1 F

## 2025-06-20 DIAGNOSIS — M47.816 LUMBAR SPONDYLOSIS: ICD-10-CM

## 2025-06-20 DIAGNOSIS — R26.81 UNSTEADY GAIT WHEN WALKING: ICD-10-CM

## 2025-06-20 DIAGNOSIS — R53.1 GENERALIZED WEAKNESS: ICD-10-CM

## 2025-06-20 DIAGNOSIS — E87.1 HYPONATREMIA: Primary | ICD-10-CM

## 2025-06-20 DIAGNOSIS — F10.11 HISTORY OF ALCOHOL ABUSE: ICD-10-CM

## 2025-06-20 PROCEDURE — 3075F SYST BP GE 130 - 139MM HG: CPT | Performed by: NURSE PRACTITIONER

## 2025-06-20 PROCEDURE — 3078F DIAST BP <80 MM HG: CPT | Performed by: NURSE PRACTITIONER

## 2025-06-20 PROCEDURE — 1126F AMNT PAIN NOTED NONE PRSNT: CPT | Performed by: NURSE PRACTITIONER

## 2025-06-20 PROCEDURE — 1159F MED LIST DOCD IN RCRD: CPT | Performed by: NURSE PRACTITIONER

## 2025-06-20 PROCEDURE — 1160F RVW MEDS BY RX/DR IN RCRD: CPT | Performed by: NURSE PRACTITIONER

## 2025-06-20 PROCEDURE — 99349 HOME/RES VST EST MOD MDM 40: CPT | Performed by: NURSE PRACTITIONER

## 2025-06-20 ASSESSMENT — PAIN SCALES - GENERAL: PAINLEVEL_OUTOF10: 0-NO PAIN

## 2025-06-20 NOTE — PROGRESS NOTES
"Subjective   Patient ID: Stephanie Jimenez is a 89 y.o. female who presents for Follow-up (1 month follow up, weakness, hyponatremia ).    Visit for 88 y/o female seen today at [facility] in [place] for 1 month follow up of weakness, hyponatremia, confusion. Patient was last hospitalized from 5/14-5/16 with altered mental status, hyponatremia, generalized weakness. Pt is seen lying in bed this morning. She is alert, oriented to person, time, place and situation. Pt does have some noticeable short term memory loss. She does not remember that she had a follow up appointment this morning. Her daughter also reported to house calls office during confirmation call yesterday that patient has had memory loss over the past year. She will have bouts of confusion and is more forgetful. Pt feels her memory is \"just fine\". Pt with PMHx HTN, HLD, Paroxysmal Atrial Fibrillation, GERD, Osteoporosis, chronic pain, lumbar spondylosis, anxiety. She is able to do her own dressing, toileting and showering but requires assistance with her medications and with meal preparation. Pt is going to the dining room for all meals. She uses her walker anytime that she ambulates outside of the apartment. Pt denies recent fall or injury. She denies any further issues with back pain. Pt reports that her pain has nearly resolved. She will occasionally have soreness to her lower back but states \"its nothing I can't live with, its not that bad anymore\". Pt does follow with pain management. She had recent appointment on 6/4/25 without any medication changes. Pt was recently found to have hyponatremia during hospitalization. She was started on Sodium tablets twice daily. Last Na+ was 137. Pt is hoping to stop taking the sodium. She is unsure if she has a follow up with nephrology scheduled. Pt was discharged home on a fluid restriction but she states \"I drink a lot of water, I have done that all my life, I'm not on any restriction\". Pt " "reports that she has been drinking socially, at \"happy Fridays\" at the [facility] facility. She denies any acute concerns today.          Current Outpatient Medications:     acetaminophen (Tylenol 8 HOUR) 650 mg ER tablet, Take 2 tablets (1,300 mg) by mouth every 8 hours if needed for mild pain (1 - 3), moderate pain (4 - 6) or headaches., Disp: , Rfl:     amLODIPine (Norvasc) 10 mg tablet, Take 1 tablet (10 mg) by mouth once daily., Disp: , Rfl:     atorvastatin (Lipitor) 10 mg tablet, Take 1 tablet (10 mg) by mouth once daily., Disp: , Rfl:     calcium carbonate-vitamin D3 (Os-Jory 500 + D3) 500 mg-5 mcg (200 unit) tablet, Take 1 tablet by mouth once daily., Disp: , Rfl:     Centrum Silver Women 8 mg iron-400 mcg-50 mcg tablet, , Disp: , Rfl:     folic acid (Folvite) 1 mg tablet, Take 1 tablet (1 mg) by mouth once daily., Disp: 30 tablet, Rfl: 11    folic acid/multivit-min/lutein (CENTRUM SILVER ORAL), Take 1 tablet by mouth once daily., Disp: , Rfl:     lidocaine (Lidoderm) 5 % patch, Place 1 patch on the skin once daily. Remove after 12 hours, Disp: , Rfl:     metoprolol succinate XL (Toprol-XL) 50 mg 24 hr tablet, take 1 tablet by mouth once daily, Disp: 30 tablet, Rfl: 5    mirtazapine (Remeron) 15 mg tablet, TAKE ONE TABLET BY MOUTH AT BEDTIME ONCE A DAY, Disp: 30 tablet, Rfl: 0    omeprazole (PriLOSEC) 20 mg DR capsule, Take 1 capsule (20 mg) by mouth once daily in the morning. Take before meals. 30 minutes prior, Disp: , Rfl:     sodium chloride 1,000 mg tablet, Take 1 tablet (1 g) by mouth 2 times daily (morning and late afternoon)., Disp: 60 tablet, Rfl: 11    thiamine (Vitamin B-1) 100 mg tablet, Take 1 tablet (100 mg) by mouth once daily., Disp: 30 tablet, Rfl: 11     Review of Systems  Constitutional:  Negative for activity change, appetite change, chills, fatigue, fever and unexpected weight change.   HENT:  Negative for congestion, hearing loss, sinus pressure, sore throat and trouble swallowing.  " "  Respiratory:  Negative for cough, chest tightness, shortness of breath and wheezing.    Cardiovascular:  Negative for chest pain and palpitations.   Gastrointestinal:  Negative for abdominal pain, constipation, diarrhea, nausea and vomiting.   Endocrine: Negative for cold intolerance.   Genitourinary:  Negative for difficulty urinating, dysuria and hematuria.   Musculoskeletal:  Positive for arthralgias, back pain and gait problem (uses walker if leaving apartment).   Skin: Positive for skin lesion left forehead, left hand.   Neurological:  Negative for dizziness, speech difficulty and light-headedness.        Positive for occasional confusion, short term memory loss  Hematological:  Does not bruise/bleed easily.   Psychiatric/Behavioral:  Positive for dysphoric mood. Negative for sleep disturbance. The patient is nervous/anxious.      Objective   /70 (BP Location: Left arm, Patient Position: Sitting, BP Cuff Size: Adult)   Pulse 82   Temp 36.7 °C (98.1 °F) (Temporal)   Resp 16   Ht 1.651 m (5' 5\")   Wt 60.3 kg (133 lb)   SpO2 99%   BMI 22.13 kg/m²     Physical Exam  Constitutional:       General: She is awake.      Appearance: Normal appearance. She is well-developed.      Comments: Alert, no acute distress, lying in bed.   HENT:      Nose: Nose normal.      Mouth/Throat:      Mouth: Mucous membranes are moist.   Eyes:      Extraocular Movements: Extraocular movements intact.      Pupils: Pupils are equal, round, and reactive to light.   Cardiovascular:      Rate and Rhythm: Normal rate and regular rhythm.      Pulses: Normal pulses.      Heart sounds: Normal heart sounds.     No edema.   Pulmonary:      Effort: Pulmonary effort is normal.      Breath sounds: Normal breath sounds. No wheezing, rhonchi or rales.   Abdominal:      General: Abdomen is flat.      Palpations: Abdomen is soft. There is no mass.      Tenderness: There is no abdominal tenderness. There is no guarding. No CVA tenderness. "   Musculoskeletal:      Cervical back: Neck supple.      Thoracic kyphosis on exam.   Neurological:      Mental Status: She is alert and oriented to person, place, and time.      Cranial Nerves: Cranial nerves 2-12 are intact.   Skin:     Comments: Crusted lesion to left forehead, small lesion to left hand, following with derm   Psychiatric:         Mood and Affect: Mood is anxious but she is cooperative.      Behavior: Behavior is cooperative.       Chemistry    Lab Results   Component Value Date/Time     05/27/2025 0943    K 4.1 05/27/2025 0943     05/27/2025 0943    CO2 27 05/27/2025 0943    BUN 9 05/27/2025 0943    CREATININE 0.66 05/27/2025 0943    Lab Results   Component Value Date/Time    CALCIUM 8.8 05/27/2025 0943    ALKPHOS 95 05/14/2025 0218    AST 18 05/14/2025 0218    ALT 12 05/14/2025 0218    BILITOT 0.6 05/14/2025 0218         Assessment/Plan   Diagnoses and all orders for this visit:  Hyponatremia  -last BMP reviewed with improvement, Na+ 137  -continue sodium chloride BID     History of alcohol abuse  -chronic, no recent alcohol use   -continue thiamine, folic acid, MVI     Generalized weakness  -improved   -recommend using walker when ambulating to decrease fall risk    Lumbar spondylosis  -chronic, back pain has overall improved   -continue Tylenol, Lidocaine patch as needed   -continue to follow with pain management as needed    Unsteady gait when walking  -chronic, no recent falls reported   -continue to use walker when ambulating to minimize fall risk     Call placed to patients daughter. Spoke to Leanne regarding patients care. Last sodium level with improvement. Dtr was told by nephrology group that pt does not need an appointment unless labs worsen/status changes. Pt has been going to happy Fridays at facility. Facility staff has been giving patient non alcoholic beverages or watered down wine. Pt also went to [facility] last week to have a biopsy of forehead lesion, results  pending. Pt will likely be having a MOHS procedure. Advised Leanne that I will follow up with pt in 2 months with plans to update routine labs. Pt/dtr to contact house calls office with any acute concerns or medication needs.        Alisha Torrez, APRN-CNP

## 2025-07-23 ENCOUNTER — OFFICE VISIT (OUTPATIENT)
Dept: PRIMARY CARE | Facility: CLINIC | Age: 89
End: 2025-07-23
Payer: MEDICARE

## 2025-07-23 VITALS
HEIGHT: 65 IN | BODY MASS INDEX: 22.92 KG/M2 | SYSTOLIC BLOOD PRESSURE: 118 MMHG | WEIGHT: 137.6 LBS | DIASTOLIC BLOOD PRESSURE: 70 MMHG

## 2025-07-23 DIAGNOSIS — F03.90 DEMENTIA, UNSPECIFIED DEMENTIA SEVERITY, UNSPECIFIED DEMENTIA TYPE, UNSPECIFIED WHETHER BEHAVIORAL, PSYCHOTIC, OR MOOD DISTURBANCE OR ANXIETY (MULTI): ICD-10-CM

## 2025-07-23 DIAGNOSIS — E78.2 MIXED HYPERLIPIDEMIA: ICD-10-CM

## 2025-07-23 DIAGNOSIS — K21.9 CHRONIC GERD: ICD-10-CM

## 2025-07-23 DIAGNOSIS — R60.0 LEG EDEMA: Primary | ICD-10-CM

## 2025-07-23 DIAGNOSIS — R82.71 BACTERIURIA: ICD-10-CM

## 2025-07-23 DIAGNOSIS — R53.83 OTHER FATIGUE: ICD-10-CM

## 2025-07-23 DIAGNOSIS — R73.03 PRE-DIABETES: ICD-10-CM

## 2025-07-23 DIAGNOSIS — I10 HTN (HYPERTENSION), BENIGN: ICD-10-CM

## 2025-07-23 DIAGNOSIS — Z13.29 THYROID DISORDER SCREENING: ICD-10-CM

## 2025-07-23 PROCEDURE — 3078F DIAST BP <80 MM HG: CPT | Performed by: INTERNAL MEDICINE

## 2025-07-23 PROCEDURE — 99203 OFFICE O/P NEW LOW 30 MIN: CPT | Performed by: INTERNAL MEDICINE

## 2025-07-23 PROCEDURE — 3074F SYST BP LT 130 MM HG: CPT | Performed by: INTERNAL MEDICINE

## 2025-07-23 PROCEDURE — 1159F MED LIST DOCD IN RCRD: CPT | Performed by: INTERNAL MEDICINE

## 2025-07-23 NOTE — PROGRESS NOTES
"Subjective   Patient ID: Stephanie Jimenez is a 89 y.o. female who presents for Follow-up.    This 89-year-old white lady came to my office first time, I welcomed.  She came here because Dr. Ordaz left system and she cannot get any doctor in their office.  Her issues are she is having memory problem.  Family perceives difficulty in memory especially recent memory.  She shows sundowning.  Dr. Ordaz gave her Remeron.  It did help.  Family does not think it has any side effect.  She is recently in a hospital and there is no follow-up on it.    I have personally reviewed the patient's Past Medical History, Medications, Allergies, Social History, and Family History in the EMR.    OPERATION:  The patient had hysterectomy.    Review of Systems   All other systems reviewed and are negative.  The patient has never had stroke.  No heart attack.  No diabetes.  She has skin cancer.  She had Mohs done.      Objective   /70   Ht 1.651 m (5' 5\")   Wt 62.4 kg (137 lb 9.6 oz)   BMI 22.90 kg/m²     Physical Exam  Vitals reviewed.   HENT:      Right Ear: Tympanic membrane, ear canal and external ear normal.      Left Ear: Tympanic membrane, ear canal and external ear normal.     Eyes:      General: No scleral icterus.     Pupils: Pupils are equal, round, and reactive to light.     Neck:      Vascular: No carotid bruit.     Cardiovascular:      Heart sounds: Normal heart sounds, S1 normal and S2 normal. No murmur heard.     No friction rub.   Pulmonary:      Effort: Pulmonary effort is normal.      Breath sounds: Normal breath sounds and air entry.   Chest:      Comments: BREAST:  Deferred by the patient.  Abdominal:      Palpations: There is no hepatomegaly, splenomegaly or mass.   Genitourinary:     Comments: VAGINAL:  Deferred by the patient.  RECTAL:  Deferred by the patient.    Musculoskeletal:         General: No swelling or deformity. Normal range of motion.      Cervical back: Neck supple.      Right lower leg: No " edema.      Left lower leg: No edema.   Lymphadenopathy:      Cervical: No cervical adenopathy.      Upper Body:      Right upper body: No axillary adenopathy.      Left upper body: No axillary adenopathy.      Lower Body: No right inguinal adenopathy. No left inguinal adenopathy.     Neurological:      Mental Status: She is oriented to person, place, and time.      Cranial Nerves: Cranial nerves 2-12 are intact. No cranial nerve deficit.      Sensory: No sensory deficit.      Motor: Motor function is intact. No weakness.      Gait: Gait is intact.      Deep Tendon Reflexes: Reflexes normal.      Comments: Mini-mental status done.   Psychiatric:         Mood and Affect: Mood normal. Mood is not anxious or depressed. Affect is not angry.         Behavior: Behavior is not agitated.         Thought Content: Thought content normal.         Judgment: Judgment normal.     LAB WORK:  Laboratory testing discussed.    Assessment/Plan   Problem List Items Addressed This Visit           ICD-10-CM    Chronic GERD K21.9    HTN (hypertension), benign I10    Relevant Orders    CBC    Urinalysis with Reflex Culture and Microscopic    Hyperlipidemia E78.5    Relevant Orders    Comprehensive Metabolic Panel    Lipid Panel     Other Visit Diagnoses         Codes      Leg edema    -  Primary R60.0      Dementia, unspecified dementia severity, unspecified dementia type, unspecified whether behavioral, psychotic, or mood disturbance or anxiety (Multi)     F03.90    Relevant Orders    MR brain w and wo IV contrast    Referral to Neurology      Other fatigue     R53.83    Relevant Orders    Thyroid Stimulating Hormone      Pre-diabetes     R73.03    Relevant Orders    Hemoglobin A1C      Bacteriuria     R82.71    Relevant Orders    Urinalysis with Reflex Culture and Microscopic      Thyroid disorder screening     Z13.29        1. Memory loss, recent memory bigger issue.  No MRI done.  MRI and B12 level ordered.  2. Leg edema, possible  congestive heart failure.  Echo was done more than two years ago.  Echo ordered.  3. Low sodium.  We cannot do the diuretic ______.  4. Hypertension, okay.  5. High cholesterol, okay.  6. Thyroid.  Monitor.  7. GERD, on PPI.  I will check magnesium and B12 level.  8. Follow-up appointment with me in a week after the test.  9. Welcome to my office, [facility].  10. We will continue to provide the care.  11. She lives in [facility].  I will work with them.    Asim Attestation  By signing my name below, I, Asim Spencer attest that this documentation has been prepared under the direction and in the presence of Karli Alvarez MD.       All medical record entries made by the asim were personally dictated by me I have reviewed the chart and agree the record accurately reflects my personal performance of his history physical examination and management

## 2025-08-06 ENCOUNTER — APPOINTMENT (OUTPATIENT)
Dept: RADIOLOGY | Facility: CLINIC | Age: 89
End: 2025-08-06
Payer: MEDICARE

## 2025-08-18 ENCOUNTER — TELEPHONE (OUTPATIENT)
Dept: PRIMARY CARE | Facility: CLINIC | Age: 89
End: 2025-08-18
Payer: MEDICARE

## 2025-08-19 ENCOUNTER — OFFICE VISIT (OUTPATIENT)
Dept: PRIMARY CARE | Facility: CLINIC | Age: 89
End: 2025-08-19
Payer: MEDICARE

## 2025-08-19 VITALS
RESPIRATION RATE: 16 BRPM | DIASTOLIC BLOOD PRESSURE: 64 MMHG | SYSTOLIC BLOOD PRESSURE: 110 MMHG | OXYGEN SATURATION: 98 % | BODY MASS INDEX: 22.66 KG/M2 | WEIGHT: 136 LBS | HEIGHT: 65 IN | TEMPERATURE: 97.3 F | HEART RATE: 79 BPM

## 2025-08-19 DIAGNOSIS — M47.816 LUMBAR SPONDYLOSIS: ICD-10-CM

## 2025-08-19 DIAGNOSIS — I10 HTN (HYPERTENSION), BENIGN: Primary | ICD-10-CM

## 2025-08-19 DIAGNOSIS — E87.1 HYPONATREMIA: ICD-10-CM

## 2025-08-19 DIAGNOSIS — R41.3 MEMORY LOSS: ICD-10-CM

## 2025-08-19 DIAGNOSIS — R26.81 UNSTEADY GAIT WHEN WALKING: ICD-10-CM

## 2025-08-19 PROCEDURE — 1160F RVW MEDS BY RX/DR IN RCRD: CPT | Performed by: NURSE PRACTITIONER

## 2025-08-19 PROCEDURE — 1159F MED LIST DOCD IN RCRD: CPT | Performed by: NURSE PRACTITIONER

## 2025-08-19 PROCEDURE — 3078F DIAST BP <80 MM HG: CPT | Performed by: NURSE PRACTITIONER

## 2025-08-19 PROCEDURE — 36415 COLL VENOUS BLD VENIPUNCTURE: CPT | Performed by: NURSE PRACTITIONER

## 2025-08-19 PROCEDURE — 3074F SYST BP LT 130 MM HG: CPT | Performed by: NURSE PRACTITIONER

## 2025-08-19 PROCEDURE — 99349 HOME/RES VST EST MOD MDM 40: CPT | Performed by: NURSE PRACTITIONER

## 2025-08-19 PROCEDURE — 1126F AMNT PAIN NOTED NONE PRSNT: CPT | Performed by: NURSE PRACTITIONER

## 2025-08-19 RX ORDER — ALPRAZOLAM 0.25 MG/1
TABLET ORAL
COMMUNITY
Start: 2025-07-22

## 2025-08-19 ASSESSMENT — PAIN SCALES - GENERAL: PAINLEVEL_OUTOF10: 0-NO PAIN

## 2025-08-20 LAB
ALBUMIN SERPL-MCNC: 4.5 G/DL (ref 3.6–5.1)
ALP SERPL-CCNC: 162 U/L (ref 37–153)
ALT SERPL-CCNC: 22 U/L (ref 6–29)
ANION GAP SERPL CALCULATED.4IONS-SCNC: 9 MMOL/L (CALC) (ref 7–17)
AST SERPL-CCNC: 22 U/L (ref 10–35)
BILIRUB SERPL-MCNC: 0.5 MG/DL (ref 0.2–1.2)
BUN SERPL-MCNC: 9 MG/DL (ref 7–25)
CALCIUM SERPL-MCNC: 9.4 MG/DL (ref 8.6–10.4)
CHLORIDE SERPL-SCNC: 94 MMOL/L (ref 98–110)
CHOLEST SERPL-MCNC: 150 MG/DL
CHOLEST/HDLC SERPL: 2.5 (CALC)
CO2 SERPL-SCNC: 26 MMOL/L (ref 20–32)
CREAT SERPL-MCNC: 0.64 MG/DL (ref 0.6–0.95)
EGFRCR SERPLBLD CKD-EPI 2021: 85 ML/MIN/1.73M2
ERYTHROCYTE [DISTWIDTH] IN BLOOD BY AUTOMATED COUNT: 13 % (ref 11–15)
EST. AVERAGE GLUCOSE BLD GHB EST-MCNC: 120 MG/DL
EST. AVERAGE GLUCOSE BLD GHB EST-SCNC: 6.6 MMOL/L
GLUCOSE SERPL-MCNC: 84 MG/DL (ref 65–99)
HBA1C MFR BLD: 5.8 %
HCT VFR BLD AUTO: 38.6 % (ref 35–45)
HDLC SERPL-MCNC: 61 MG/DL
HGB BLD-MCNC: 12.5 G/DL (ref 11.7–15.5)
LDLC SERPL CALC-MCNC: 74 MG/DL (CALC)
MCH RBC QN AUTO: 30.6 PG (ref 27–33)
MCHC RBC AUTO-ENTMCNC: 32.4 G/DL (ref 32–36)
MCV RBC AUTO: 94.6 FL (ref 80–100)
NONHDLC SERPL-MCNC: 89 MG/DL (CALC)
PLATELET # BLD AUTO: 425 THOUSAND/UL (ref 140–400)
PMV BLD REES-ECKER: 9 FL (ref 7.5–12.5)
POTASSIUM SERPL-SCNC: 4.1 MMOL/L (ref 3.5–5.3)
PROT SERPL-MCNC: 6.8 G/DL (ref 6.1–8.1)
RBC # BLD AUTO: 4.08 MILLION/UL (ref 3.8–5.1)
SODIUM SERPL-SCNC: 129 MMOL/L (ref 135–146)
TRIGL SERPL-MCNC: 74 MG/DL
TSH SERPL-ACNC: 3.21 MIU/L (ref 0.4–4.5)
WBC # BLD AUTO: 9.1 THOUSAND/UL (ref 3.8–10.8)

## 2025-08-29 ENCOUNTER — APPOINTMENT (OUTPATIENT)
Dept: PRIMARY CARE | Facility: CLINIC | Age: 89
End: 2025-08-29
Payer: MEDICARE

## 2025-09-15 ENCOUNTER — APPOINTMENT (OUTPATIENT)
Dept: PRIMARY CARE | Facility: CLINIC | Age: 89
End: 2025-09-15
Payer: MEDICARE

## 2026-02-24 ENCOUNTER — APPOINTMENT (OUTPATIENT)
Dept: NEUROLOGY | Facility: CLINIC | Age: OVER 89
End: 2026-02-24
Payer: MEDICARE